# Patient Record
Sex: FEMALE | Race: WHITE | NOT HISPANIC OR LATINO | Employment: UNEMPLOYED | URBAN - METROPOLITAN AREA
[De-identification: names, ages, dates, MRNs, and addresses within clinical notes are randomized per-mention and may not be internally consistent; named-entity substitution may affect disease eponyms.]

---

## 2021-07-22 ENCOUNTER — APPOINTMENT (OUTPATIENT)
Dept: GENERAL RADIOLOGY | Facility: HOSPITAL | Age: 81
End: 2021-07-22

## 2021-07-22 ENCOUNTER — APPOINTMENT (OUTPATIENT)
Dept: CT IMAGING | Facility: HOSPITAL | Age: 81
End: 2021-07-22

## 2021-07-22 ENCOUNTER — HOSPITAL ENCOUNTER (OUTPATIENT)
Facility: HOSPITAL | Age: 81
Setting detail: OBSERVATION
LOS: 1 days | Discharge: HOME | End: 2021-07-25
Attending: EMERGENCY MEDICINE | Admitting: INTERNAL MEDICINE

## 2021-07-22 DIAGNOSIS — R41.82 ALTERED MENTAL STATUS, UNSPECIFIED ALTERED MENTAL STATUS TYPE: ICD-10-CM

## 2021-07-22 DIAGNOSIS — A41.9 ACUTE SEPSIS (HCC): Primary | ICD-10-CM

## 2021-07-22 DIAGNOSIS — Z74.09 IMPAIRED FUNCTIONAL MOBILITY, BALANCE, GAIT, AND ENDURANCE: ICD-10-CM

## 2021-07-22 DIAGNOSIS — R13.11 ORAL PHASE DYSPHAGIA: ICD-10-CM

## 2021-07-22 DIAGNOSIS — R09.02 HYPOXIA: ICD-10-CM

## 2021-07-22 DIAGNOSIS — N12 PYELONEPHRITIS: ICD-10-CM

## 2021-07-22 DIAGNOSIS — R73.9 HYPERGLYCEMIA: ICD-10-CM

## 2021-07-22 PROBLEM — I16.0 HYPERTENSIVE URGENCY: Status: ACTIVE | Noted: 2021-07-22

## 2021-07-22 PROBLEM — R79.89 ELEVATED SERUM CREATININE: Status: ACTIVE | Noted: 2021-07-22

## 2021-07-22 PROBLEM — E11.65 TYPE 2 DIABETES MELLITUS WITH HYPERGLYCEMIA: Status: ACTIVE | Noted: 2021-07-22

## 2021-07-22 LAB
ALBUMIN SERPL-MCNC: 4.3 G/DL (ref 3.5–5.2)
ALBUMIN/GLOB SERPL: 1.5 G/DL
ALP SERPL-CCNC: 86 U/L (ref 39–117)
ALT SERPL W P-5'-P-CCNC: 19 U/L (ref 1–33)
AMMONIA BLD-SCNC: 14 UMOL/L (ref 11–51)
ANION GAP SERPL CALCULATED.3IONS-SCNC: 12 MMOL/L (ref 5–15)
ANION GAP SERPL CALCULATED.3IONS-SCNC: 16 MMOL/L (ref 5–15)
ARTERIAL PATENCY WRIST A: ABNORMAL
AST SERPL-CCNC: 20 U/L (ref 1–32)
ATMOSPHERIC PRESS: ABNORMAL MM[HG]
B-OH-BUTYR SERPL-SCNC: 0.15 MMOL/L (ref 0.02–0.27)
BACTERIA UR QL AUTO: ABNORMAL /HPF
BASE EXCESS BLDA CALC-SCNC: 4.7 MMOL/L (ref 0–2)
BASOPHILS # BLD AUTO: 0.02 10*3/MM3 (ref 0–0.2)
BASOPHILS NFR BLD AUTO: 0.3 % (ref 0–1.5)
BDY SITE: ABNORMAL
BILIRUB SERPL-MCNC: 0.3 MG/DL (ref 0–1.2)
BILIRUB UR QL STRIP: NEGATIVE
BODY TEMPERATURE: 37 C
BUN SERPL-MCNC: 17 MG/DL (ref 8–23)
BUN SERPL-MCNC: 24 MG/DL (ref 8–23)
BUN/CREAT SERPL: 19.5 (ref 7–25)
BUN/CREAT SERPL: 20 (ref 7–25)
CALCIUM SPEC-SCNC: 9.2 MG/DL (ref 8.6–10.5)
CALCIUM SPEC-SCNC: 9.6 MG/DL (ref 8.6–10.5)
CHLORIDE SERPL-SCNC: 95 MMOL/L (ref 98–107)
CHLORIDE SERPL-SCNC: 95 MMOL/L (ref 98–107)
CK SERPL-CCNC: 40 U/L (ref 20–180)
CLARITY UR: CLEAR
CO2 BLDA-SCNC: 31.8 MMOL/L (ref 22–33)
CO2 SERPL-SCNC: 27 MMOL/L (ref 22–29)
CO2 SERPL-SCNC: 29 MMOL/L (ref 22–29)
COHGB MFR BLD: ABNORMAL %
COLOR UR: YELLOW
CREAT SERPL-MCNC: 0.87 MG/DL (ref 0.57–1)
CREAT SERPL-MCNC: 1.2 MG/DL (ref 0.57–1)
D-LACTATE SERPL-SCNC: 2.6 MMOL/L (ref 0.5–2)
D-LACTATE SERPL-SCNC: 2.8 MMOL/L (ref 0.5–2)
DEPRECATED RDW RBC AUTO: 48.3 FL (ref 37–54)
EOSINOPHIL # BLD AUTO: 0.02 10*3/MM3 (ref 0–0.4)
EOSINOPHIL NFR BLD AUTO: 0.3 % (ref 0.3–6.2)
EPAP: 0
ERYTHROCYTE [DISTWIDTH] IN BLOOD BY AUTOMATED COUNT: 14.1 % (ref 12.3–15.4)
FLUAV RNA RESP QL NAA+PROBE: NOT DETECTED
FLUBV RNA RESP QL NAA+PROBE: NOT DETECTED
GFR SERPL CREATININE-BSD FRML MDRD: 43 ML/MIN/1.73
GFR SERPL CREATININE-BSD FRML MDRD: 62 ML/MIN/1.73
GLOBULIN UR ELPH-MCNC: 2.9 GM/DL
GLUCOSE BLDC GLUCOMTR-MCNC: 106 MG/DL (ref 70–130)
GLUCOSE BLDC GLUCOMTR-MCNC: 112 MG/DL (ref 70–130)
GLUCOSE BLDC GLUCOMTR-MCNC: 245 MG/DL (ref 70–130)
GLUCOSE SERPL-MCNC: 209 MG/DL (ref 65–99)
GLUCOSE SERPL-MCNC: 345 MG/DL (ref 65–99)
GLUCOSE UR STRIP-MCNC: ABNORMAL MG/DL
HCO3 BLDA-SCNC: 30.3 MMOL/L (ref 20–26)
HCT VFR BLD AUTO: 39.9 % (ref 34–46.6)
HCT VFR BLD CALC: 37.2 %
HGB BLD-MCNC: 12.4 G/DL (ref 12–15.9)
HGB BLDA-MCNC: 12.1 G/DL (ref 14–18)
HGB UR QL STRIP.AUTO: NEGATIVE
HYALINE CASTS UR QL AUTO: ABNORMAL /LPF
IMM GRANULOCYTES # BLD AUTO: 0.03 10*3/MM3 (ref 0–0.05)
IMM GRANULOCYTES NFR BLD AUTO: 0.4 % (ref 0–0.5)
INHALED O2 CONCENTRATION: 32 %
INR PPP: 1.02 (ref 0.85–1.16)
IPAP: 0
KETONES UR QL STRIP: NEGATIVE
LEUKOCYTE ESTERASE UR QL STRIP.AUTO: NEGATIVE
LIPASE SERPL-CCNC: 43 U/L (ref 13–60)
LYMPHOCYTES # BLD AUTO: 1.23 10*3/MM3 (ref 0.7–3.1)
LYMPHOCYTES NFR BLD AUTO: 15.4 % (ref 19.6–45.3)
MCH RBC QN AUTO: 29.4 PG (ref 26.6–33)
MCHC RBC AUTO-ENTMCNC: 31.1 G/DL (ref 31.5–35.7)
MCV RBC AUTO: 94.5 FL (ref 79–97)
METHGB BLD QL: 0.5 % (ref 0–1.5)
MODALITY: ABNORMAL
MONOCYTES # BLD AUTO: 0.46 10*3/MM3 (ref 0.1–0.9)
MONOCYTES NFR BLD AUTO: 5.8 % (ref 5–12)
NEUTROPHILS NFR BLD AUTO: 6.23 10*3/MM3 (ref 1.7–7)
NEUTROPHILS NFR BLD AUTO: 77.8 % (ref 42.7–76)
NITRITE UR QL STRIP: POSITIVE
NOTE: ABNORMAL
NRBC BLD AUTO-RTO: 0 /100 WBC (ref 0–0.2)
NT-PROBNP SERPL-MCNC: 800.5 PG/ML (ref 0–1800)
OXYHGB MFR BLDV: 92.2 % (ref 94–99)
PAW @ PEAK INSP FLOW SETTING VENT: 0 CMH2O
PCO2 BLDA: 48.4 MM HG (ref 35–45)
PCO2 TEMP ADJ BLD: 48.4 MM HG (ref 35–45)
PH BLDA: 7.41 PH UNITS (ref 7.35–7.45)
PH UR STRIP.AUTO: 7 [PH] (ref 5–8)
PH, TEMP CORRECTED: 7.41 PH UNITS
PLATELET # BLD AUTO: 128 10*3/MM3 (ref 140–450)
PMV BLD AUTO: 13.5 FL (ref 6–12)
PO2 BLDA: 69.6 MM HG (ref 83–108)
PO2 TEMP ADJ BLD: 69.6 MM HG (ref 83–108)
POTASSIUM SERPL-SCNC: 3.7 MMOL/L (ref 3.5–5.2)
POTASSIUM SERPL-SCNC: 4.4 MMOL/L (ref 3.5–5.2)
PROCALCITONIN SERPL-MCNC: 0.08 NG/ML (ref 0–0.25)
PROT SERPL-MCNC: 7.2 G/DL (ref 6–8.5)
PROT UR QL STRIP: ABNORMAL
PROTHROMBIN TIME: 13.1 SECONDS (ref 11.4–14.4)
QT INTERVAL: 400 MS
QTC INTERVAL: 478 MS
RBC # BLD AUTO: 4.22 10*6/MM3 (ref 3.77–5.28)
RBC # UR: ABNORMAL /HPF
REF LAB TEST METHOD: ABNORMAL
SALICYLATES SERPL-MCNC: <0.3 MG/DL
SARS-COV-2 RNA RESP QL NAA+PROBE: NOT DETECTED
SODIUM SERPL-SCNC: 136 MMOL/L (ref 136–145)
SODIUM SERPL-SCNC: 138 MMOL/L (ref 136–145)
SP GR UR STRIP: 1.01 (ref 1–1.03)
SQUAMOUS #/AREA URNS HPF: ABNORMAL /HPF
T4 FREE SERPL-MCNC: 1.15 NG/DL (ref 0.93–1.7)
TOTAL RATE: 0 BREATHS/MINUTE
TROPONIN T SERPL-MCNC: <0.01 NG/ML (ref 0–0.03)
TSH SERPL DL<=0.05 MIU/L-ACNC: 1.47 UIU/ML (ref 0.27–4.2)
UROBILINOGEN UR QL STRIP: ABNORMAL
VENTILATOR MODE: ABNORMAL
WBC # BLD AUTO: 7.99 10*3/MM3 (ref 3.4–10.8)
WBC UR QL AUTO: ABNORMAL /HPF

## 2021-07-22 PROCEDURE — 71045 X-RAY EXAM CHEST 1 VIEW: CPT

## 2021-07-22 PROCEDURE — P9612 CATHETERIZE FOR URINE SPEC: HCPCS

## 2021-07-22 PROCEDURE — 83605 ASSAY OF LACTIC ACID: CPT | Performed by: EMERGENCY MEDICINE

## 2021-07-22 PROCEDURE — 36600 WITHDRAWAL OF ARTERIAL BLOOD: CPT

## 2021-07-22 PROCEDURE — 85610 PROTHROMBIN TIME: CPT | Performed by: INTERNAL MEDICINE

## 2021-07-22 PROCEDURE — 83690 ASSAY OF LIPASE: CPT | Performed by: EMERGENCY MEDICINE

## 2021-07-22 PROCEDURE — 74176 CT ABD & PELVIS W/O CONTRAST: CPT

## 2021-07-22 PROCEDURE — 82550 ASSAY OF CK (CPK): CPT | Performed by: EMERGENCY MEDICINE

## 2021-07-22 PROCEDURE — 93005 ELECTROCARDIOGRAM TRACING: CPT | Performed by: EMERGENCY MEDICINE

## 2021-07-22 PROCEDURE — 25010000002 CEFTRIAXONE PER 250 MG: Performed by: INTERNAL MEDICINE

## 2021-07-22 PROCEDURE — 84484 ASSAY OF TROPONIN QUANT: CPT | Performed by: EMERGENCY MEDICINE

## 2021-07-22 PROCEDURE — 92610 EVALUATE SWALLOWING FUNCTION: CPT

## 2021-07-22 PROCEDURE — 96365 THER/PROPH/DIAG IV INF INIT: CPT

## 2021-07-22 PROCEDURE — C9803 HOPD COVID-19 SPEC COLLECT: HCPCS

## 2021-07-22 PROCEDURE — 83050 HGB METHEMOGLOBIN QUAN: CPT

## 2021-07-22 PROCEDURE — 82375 ASSAY CARBOXYHB QUANT: CPT

## 2021-07-22 PROCEDURE — 99220 PR INITIAL OBSERVATION CARE/DAY 70 MINUTES: CPT | Performed by: INTERNAL MEDICINE

## 2021-07-22 PROCEDURE — 87186 SC STD MICRODIL/AGAR DIL: CPT | Performed by: EMERGENCY MEDICINE

## 2021-07-22 PROCEDURE — 85025 COMPLETE CBC W/AUTO DIFF WBC: CPT | Performed by: EMERGENCY MEDICINE

## 2021-07-22 PROCEDURE — 82962 GLUCOSE BLOOD TEST: CPT

## 2021-07-22 PROCEDURE — 87040 BLOOD CULTURE FOR BACTERIA: CPT | Performed by: EMERGENCY MEDICINE

## 2021-07-22 PROCEDURE — 25010000002 PIPERACILLIN SOD-TAZOBACTAM PER 1 G: Performed by: EMERGENCY MEDICINE

## 2021-07-22 PROCEDURE — 84145 PROCALCITONIN (PCT): CPT | Performed by: INTERNAL MEDICINE

## 2021-07-22 PROCEDURE — 83880 ASSAY OF NATRIURETIC PEPTIDE: CPT | Performed by: EMERGENCY MEDICINE

## 2021-07-22 PROCEDURE — 82010 KETONE BODYS QUAN: CPT | Performed by: INTERNAL MEDICINE

## 2021-07-22 PROCEDURE — 82805 BLOOD GASES W/O2 SATURATION: CPT

## 2021-07-22 PROCEDURE — 63710000001 INSULIN LISPRO (HUMAN) PER 5 UNITS: Performed by: INTERNAL MEDICINE

## 2021-07-22 PROCEDURE — 70450 CT HEAD/BRAIN W/O DYE: CPT

## 2021-07-22 PROCEDURE — 81001 URINALYSIS AUTO W/SCOPE: CPT | Performed by: EMERGENCY MEDICINE

## 2021-07-22 PROCEDURE — 82140 ASSAY OF AMMONIA: CPT | Performed by: EMERGENCY MEDICINE

## 2021-07-22 PROCEDURE — 84439 ASSAY OF FREE THYROXINE: CPT | Performed by: EMERGENCY MEDICINE

## 2021-07-22 PROCEDURE — 80179 DRUG ASSAY SALICYLATE: CPT | Performed by: EMERGENCY MEDICINE

## 2021-07-22 PROCEDURE — 87077 CULTURE AEROBIC IDENTIFY: CPT | Performed by: EMERGENCY MEDICINE

## 2021-07-22 PROCEDURE — 84443 ASSAY THYROID STIM HORMONE: CPT | Performed by: EMERGENCY MEDICINE

## 2021-07-22 PROCEDURE — 87636 SARSCOV2 & INF A&B AMP PRB: CPT | Performed by: EMERGENCY MEDICINE

## 2021-07-22 PROCEDURE — 87086 URINE CULTURE/COLONY COUNT: CPT | Performed by: EMERGENCY MEDICINE

## 2021-07-22 PROCEDURE — 99285 EMERGENCY DEPT VISIT HI MDM: CPT

## 2021-07-22 PROCEDURE — 80053 COMPREHEN METABOLIC PANEL: CPT | Performed by: EMERGENCY MEDICINE

## 2021-07-22 RX ORDER — AMOXICILLIN 250 MG
2 CAPSULE ORAL 2 TIMES DAILY
Status: DISCONTINUED | OUTPATIENT
Start: 2021-07-22 | End: 2021-07-25 | Stop reason: HOSPADM

## 2021-07-22 RX ORDER — HYDRALAZINE HYDROCHLORIDE 10 MG/1
10 TABLET, FILM COATED ORAL 3 TIMES DAILY
COMMUNITY

## 2021-07-22 RX ORDER — NYSTATIN 100000 [USP'U]/G
POWDER TOPICAL EVERY 12 HOURS SCHEDULED
Status: DISCONTINUED | OUTPATIENT
Start: 2021-07-22 | End: 2021-07-25 | Stop reason: HOSPADM

## 2021-07-22 RX ORDER — SODIUM CHLORIDE 0.9 % (FLUSH) 0.9 %
10 SYRINGE (ML) INJECTION EVERY 12 HOURS SCHEDULED
Status: DISCONTINUED | OUTPATIENT
Start: 2021-07-22 | End: 2021-07-25 | Stop reason: HOSPADM

## 2021-07-22 RX ORDER — ACETAMINOPHEN 325 MG/1
650 TABLET ORAL EVERY 4 HOURS PRN
Status: DISCONTINUED | OUTPATIENT
Start: 2021-07-22 | End: 2021-07-25 | Stop reason: HOSPADM

## 2021-07-22 RX ORDER — ACETAMINOPHEN 650 MG/1
650 SUPPOSITORY RECTAL EVERY 4 HOURS PRN
Status: DISCONTINUED | OUTPATIENT
Start: 2021-07-22 | End: 2021-07-25 | Stop reason: HOSPADM

## 2021-07-22 RX ORDER — FUROSEMIDE 20 MG/1
20 TABLET ORAL DAILY
COMMUNITY

## 2021-07-22 RX ORDER — ACETAMINOPHEN 160 MG/5ML
650 SOLUTION ORAL EVERY 4 HOURS PRN
Status: DISCONTINUED | OUTPATIENT
Start: 2021-07-22 | End: 2021-07-25 | Stop reason: HOSPADM

## 2021-07-22 RX ORDER — DEXTROSE MONOHYDRATE 25 G/50ML
25 INJECTION, SOLUTION INTRAVENOUS
Status: DISCONTINUED | OUTPATIENT
Start: 2021-07-22 | End: 2021-07-25 | Stop reason: HOSPADM

## 2021-07-22 RX ORDER — POLYETHYLENE GLYCOL 3350 17 G/17G
17 POWDER, FOR SOLUTION ORAL DAILY PRN
Status: DISCONTINUED | OUTPATIENT
Start: 2021-07-22 | End: 2021-07-25 | Stop reason: HOSPADM

## 2021-07-22 RX ORDER — ACETAMINOPHEN 325 MG/1
650 TABLET ORAL EVERY 4 HOURS PRN
COMMUNITY

## 2021-07-22 RX ORDER — SODIUM CHLORIDE 9 MG/ML
100 INJECTION, SOLUTION INTRAVENOUS CONTINUOUS
Status: DISCONTINUED | OUTPATIENT
Start: 2021-07-22 | End: 2021-07-25 | Stop reason: HOSPADM

## 2021-07-22 RX ORDER — ISOSORBIDE MONONITRATE 30 MG/1
30 TABLET, EXTENDED RELEASE ORAL DAILY PRN
Status: ON HOLD | COMMUNITY
End: 2021-07-25 | Stop reason: SDUPTHER

## 2021-07-22 RX ORDER — GLIPIZIDE 5 MG/1
5 TABLET ORAL DAILY
Status: ON HOLD | COMMUNITY
End: 2021-07-23

## 2021-07-22 RX ORDER — NICOTINE POLACRILEX 4 MG
15 LOZENGE BUCCAL
Status: DISCONTINUED | OUTPATIENT
Start: 2021-07-22 | End: 2021-07-25 | Stop reason: HOSPADM

## 2021-07-22 RX ORDER — BISACODYL 10 MG
10 SUPPOSITORY, RECTAL RECTAL DAILY PRN
Status: DISCONTINUED | OUTPATIENT
Start: 2021-07-22 | End: 2021-07-25 | Stop reason: HOSPADM

## 2021-07-22 RX ORDER — BISACODYL 5 MG/1
5 TABLET, DELAYED RELEASE ORAL DAILY PRN
Status: DISCONTINUED | OUTPATIENT
Start: 2021-07-22 | End: 2021-07-25 | Stop reason: HOSPADM

## 2021-07-22 RX ORDER — LOSARTAN POTASSIUM 25 MG/1
25 TABLET ORAL DAILY
COMMUNITY

## 2021-07-22 RX ORDER — LOPERAMIDE HYDROCHLORIDE 2 MG/1
2 CAPSULE ORAL 4 TIMES DAILY PRN
COMMUNITY

## 2021-07-22 RX ORDER — CEFTRIAXONE SODIUM 1 G/50ML
1 INJECTION, SOLUTION INTRAVENOUS EVERY 24 HOURS
Status: COMPLETED | OUTPATIENT
Start: 2021-07-22 | End: 2021-07-25

## 2021-07-22 RX ORDER — LABETALOL HYDROCHLORIDE 5 MG/ML
10 INJECTION, SOLUTION INTRAVENOUS
Status: DISCONTINUED | OUTPATIENT
Start: 2021-07-22 | End: 2021-07-25 | Stop reason: HOSPADM

## 2021-07-22 RX ORDER — LOSARTAN POTASSIUM 25 MG/1
25 TABLET ORAL
Status: DISCONTINUED | OUTPATIENT
Start: 2021-07-22 | End: 2021-07-25 | Stop reason: HOSPADM

## 2021-07-22 RX ORDER — ROSUVASTATIN CALCIUM 40 MG/1
40 TABLET, COATED ORAL NIGHTLY
COMMUNITY

## 2021-07-22 RX ORDER — SODIUM CHLORIDE 0.9 % (FLUSH) 0.9 %
10 SYRINGE (ML) INJECTION AS NEEDED
Status: DISCONTINUED | OUTPATIENT
Start: 2021-07-22 | End: 2021-07-25 | Stop reason: HOSPADM

## 2021-07-22 RX ORDER — CARVEDILOL 6.25 MG/1
6.25 TABLET ORAL 2 TIMES DAILY
COMMUNITY

## 2021-07-22 RX ORDER — CARVEDILOL 6.25 MG/1
6.25 TABLET ORAL 2 TIMES DAILY WITH MEALS
Status: DISCONTINUED | OUTPATIENT
Start: 2021-07-22 | End: 2021-07-25 | Stop reason: HOSPADM

## 2021-07-22 RX ADMIN — TAZOBACTAM SODIUM AND PIPERACILLIN SODIUM 4.5 G: 500; 4 INJECTION, SOLUTION INTRAVENOUS at 06:55

## 2021-07-22 RX ADMIN — RIVAROXABAN 20 MG: 20 TABLET, FILM COATED ORAL at 18:55

## 2021-07-22 RX ADMIN — CARVEDILOL 6.25 MG: 6.25 TABLET, FILM COATED ORAL at 18:55

## 2021-07-22 RX ADMIN — INSULIN LISPRO 3 UNITS: 100 INJECTION, SOLUTION INTRAVENOUS; SUBCUTANEOUS at 12:29

## 2021-07-22 RX ADMIN — CEFTRIAXONE SODIUM 1 G: 1 INJECTION, SOLUTION INTRAVENOUS at 15:38

## 2021-07-22 RX ADMIN — NYSTATIN: 100000 POWDER TOPICAL at 22:12

## 2021-07-22 RX ADMIN — DOCUSATE SODIUM 50MG AND SENNOSIDES 8.6MG 2 TABLET: 8.6; 5 TABLET, FILM COATED ORAL at 22:11

## 2021-07-22 NOTE — ED PROVIDER NOTES
[place]    EMERGENCY DEPARTMENT ENCOUNTER      Pt Name: Angeles Hoffman  MRN: [id number]  YOB: 1940  Date of evaluation: 7/22/2021  Provider: Rashad Santa MD    CHIEF COMPLAINT       Chief Complaint   Patient presents with   • Altered Mental Status         HISTORY OF PRESENT ILLNESS  (Location/Symptom, Timing/Onset, Context/Setting, Quality, Duration, Modifying Factors, Severity.)   Angeles Hoffman is a 80 y.o. female who presents to the emergency department with reports of altered mental status of unknown duration.  Per EMS, history was limited from the patient's  at home who speaks very little English.  She apparently has been very altered, much more so than usual, over the course of the past day and her glucose reading has also been too high for her home monitor to read.  Her past medical history is unclear at this point is no additional history was given by EMS and we have no record for her here.  We are unable to obtain any additional history from the patient herself secondary to alteration in mental status and there is no contact information for family members.      Nursing notes were reviewed.    REVIEW OF SYSTEMS    (2-9 systems for level 4, 10 or more for level 5)   ROS:  Unable to obtain secondary to mental status and language barrier    PAST MEDICAL HISTORY   Unknown    SURGICAL HISTORY     Unknown    CURRENT MEDICATIONS       Current Facility-Administered Medications:   •  piperacillin-tazobactam (ZOSYN) 4.5 g in iso-osmotic dextrose 100 mL IVPB (premix), 4.5 g, Intravenous, Once, Rashad Santa MD  No current outpatient medications on file.    ALLERGIES     Patient has no allergy information on record.    FAMILY HISTORY     No family history on file.       SOCIAL HISTORY       Social History     Socioeconomic History   • Marital status:      Spouse name: Not on file   • Number of children: Not on file   • Years of education: Not on file   • Highest education level:  "Not on file         PHYSICAL EXAM    (up to 7 for level 4, 8 or more for level 5)     Vitals:    07/22/21 0507 07/22/21 0541 07/22/21 0542 07/22/21 0620   BP: (!) 143/101  (!) 216/95 (!) 203/82   BP Location: Left arm      Patient Position: Lying      Pulse: 89   74   Resp:   22    Temp:   99.9 °F (37.7 °C)    TempSrc:   Oral    SpO2: 97%   95%   Weight:  104 kg (230 lb)     Height:  177.8 cm (70\")         Physical Exam  General: Awake, mild distress  HEENT: Conjunctivae normal.  Pupils equal and reactive  Neck: Trachea midline.  Cardiac: Heart regular rate, rhythm, no murmurs, rubs, or gallops  Lungs: Lungs coarse throughout  Chest wall: No deformity  Abdomen: Abdomen is soft, nontender, nondistended.  Musculoskeletal: No deformity.  Neuro: Moves all extremities equally  Dermatology: Skin is warm and dry  Psych: Unable to assess        DIAGNOSTIC RESULTS     EKG: All EKG's are interpreted by the Emergency Department Physician who either signs or Co-signs this chart in the absence of a cardiologist.    Bifascicular block, sinus rhythm rate of 86, no acute ischemic changes    RADIOLOGY:   Non-plain film images such as CT, Ultrasound and MRI are read by the radiologist. Plain radiographic images are visualized and preliminarily interpreted by the emergency physician with the below findings:      [x] Radiologist's Report Reviewed:  CT Head Without Contrast   Final Result   Senescent changes without acute abnormality.               Signer Name: Tam Carmen MD    Signed: 7/22/2021 6:07 AM    Workstation Name: [facility]     Radiology [facility]      CT Abdomen Pelvis Without Contrast   Final Result   No acute or inflammatory changes are seen in the abdomen or pelvis.               Signer Name: Tam Carmen MD    Signed: 7/22/2021 6:13 AM    Workstation Name: [facility]     Radiology [facility]      XR Chest 1 View    (Results Pending)         LABS:    I have reviewed and interpreted all " of the currently available lab results from this visit (if applicable):  Results for orders placed or performed during the hospital encounter of 07/22/21   COVID-19 and FLU A/B PCR - Swab, Nasopharynx    Specimen: Nasopharynx; Swab   Result Value Ref Range    COVID19 Not Detected Not Detected - Ref. Range    Influenza A PCR Not Detected Not Detected    Influenza B PCR Not Detected Not Detected   Comprehensive Metabolic Panel    Specimen: Blood   Result Value Ref Range    Glucose 345 (H) 65 - 99 mg/dL    BUN 24 (H) 8 - 23 mg/dL    Creatinine 1.20 (H) 0.57 - 1.00 mg/dL    Sodium 138 136 - 145 mmol/L    Potassium 4.4 3.5 - 5.2 mmol/L    Chloride 95 (L) 98 - 107 mmol/L    CO2 27.0 22.0 - 29.0 mmol/L    Calcium 9.6 8.6 - 10.5 mg/dL    Total Protein 7.2 6.0 - 8.5 g/dL    Albumin 4.30 3.50 - 5.20 g/dL    ALT (SGPT) 19 1 - 33 U/L    AST (SGOT) 20 1 - 32 U/L    Alkaline Phosphatase 86 39 - 117 U/L    Total Bilirubin 0.3 0.0 - 1.2 mg/dL    eGFR Non African Amer 43 (L) >60 mL/min/1.73    Globulin 2.9 gm/dL    A/G Ratio 1.5 g/dL    BUN/Creatinine Ratio 20.0 7.0 - 25.0    Anion Gap 16.0 (H) 5.0 - 15.0 mmol/L   Lipase    Specimen: Blood   Result Value Ref Range    Lipase 43 13 - 60 U/L   BNP    Specimen: Blood   Result Value Ref Range    proBNP 800.5 0.0-1,800.0 pg/mL   Troponin    Specimen: Blood   Result Value Ref Range    Troponin T <0.010 0.000 - 0.030 ng/mL   Lactic Acid, Plasma    Specimen: Blood   Result Value Ref Range    Lactate 2.6 (C) 0.5 - 2.0 mmol/L   Salicylate Level    Specimen: Blood   Result Value Ref Range    Salicylate <0.3 <=30.0 mg/dL   CK    Specimen: Blood   Result Value Ref Range    Creatine Kinase 40 20 - 180 U/L   TSH    Specimen: Blood   Result Value Ref Range    TSH 1.470 0.270 - 4.200 uIU/mL   T4, Free    Specimen: Blood   Result Value Ref Range    Free T4 1.15 0.93 - 1.70 ng/dL   Ammonia    Specimen: Blood   Result Value Ref Range    Ammonia 14 11 - 51 umol/L   CBC Auto Differential    Specimen:  Blood   Result Value Ref Range    WBC 7.99 3.40 - 10.80 10*3/mm3    RBC 4.22 3.77 - 5.28 10*6/mm3    Hemoglobin 12.4 12.0 - 15.9 g/dL    Hematocrit 39.9 34.0 - 46.6 %    MCV 94.5 79.0 - 97.0 fL    MCH 29.4 26.6 - 33.0 pg    MCHC 31.1 (L) 31.5 - 35.7 g/dL    RDW 14.1 12.3 - 15.4 %    RDW-SD 48.3 37.0 - 54.0 fl    MPV 13.5 (H) 6.0 - 12.0 fL    Platelets 128 (L) 140 - 450 10*3/mm3    Neutrophil % 77.8 (H) 42.7 - 76.0 %    Lymphocyte % 15.4 (L) 19.6 - 45.3 %    Monocyte % 5.8 5.0 - 12.0 %    Eosinophil % 0.3 0.3 - 6.2 %    Basophil % 0.3 0.0 - 1.5 %    Immature Grans % 0.4 0.0 - 0.5 %    Neutrophils, Absolute 6.23 1.70 - 7.00 10*3/mm3    Lymphocytes, Absolute 1.23 0.70 - 3.10 10*3/mm3    Monocytes, Absolute 0.46 0.10 - 0.90 10*3/mm3    Eosinophils, Absolute 0.02 0.00 - 0.40 10*3/mm3    Basophils, Absolute 0.02 0.00 - 0.20 10*3/mm3    Immature Grans, Absolute 0.03 0.00 - 0.05 10*3/mm3    nRBC 0.0 0.0 - 0.2 /100 WBC   Urinalysis With Culture If Indicated - Urine, Catheter    Specimen: Urine, Catheter   Result Value Ref Range    Color, UA Yellow Yellow, Straw    Appearance, UA Clear Clear    pH, UA 7.0 5.0 - 8.0    Specific Gravity, UA 1.014 1.001 - 1.030    Glucose, UA >=1000 mg/dL (3+) (A) Negative    Ketones, UA Negative Negative    Bilirubin, UA Negative Negative    Blood, UA Negative Negative    Protein, UA 30 mg/dL (1+) (A) Negative    Leuk Esterase, UA Negative Negative    Nitrite, UA Positive (A) Negative    Urobilinogen, UA 0.2 E.U./dL 0.2 - 1.0 E.U./dL   Blood Gas, Arterial With Co-Ox    Specimen: Arterial Blood   Result Value Ref Range    Site Left Radial     Amrik's Test N/A     pH, Arterial 7.405 7.350 - 7.450 pH units    pCO2, Arterial 48.4 (H) 35.0 - 45.0 mm Hg    pO2, Arterial 69.6 (L) 83.0 - 108.0 mm Hg    HCO3, Arterial 30.3 (H) 20.0 - 26.0 mmol/L    Base Excess, Arterial 4.7 (H) 0.0 - 2.0 mmol/L    Hemoglobin, Blood Gas 12.1 (L) 14 - 18 g/dL    Hematocrit, Blood Gas 37.2 %    Oxyhemoglobin 92.2 (L) 94 -  "99 %    Methemoglobin 0.50 0.00 - 1.50 %    Carboxyhemoglobin      CO2 Content 31.8 22 - 33 mmol/L    Temperature 37.0 C    Barometric Pressure for Blood Gas      Modality Nasal Cannula     FIO2 32 %    Ventilator Mode       Rate 0 Breaths/minute    PIP 0 cmH2O    IPAP 0     EPAP 0     Note      pH, Temp Corrected 7.405 pH Units    pCO2, Temperature Corrected 48.4 (H) 35 - 45 mm Hg    pO2, Temperature Corrected 69.6 (L) 83 - 108 mm Hg   Urinalysis, Microscopic Only - Urine, Catheter    Specimen: Urine, Catheter   Result Value Ref Range    RBC, UA 0-2 None Seen, 0-2 /HPF    WBC, UA 0-2 None Seen, 0-2 /HPF    Bacteria, UA 4+ (A) None Seen, Trace /HPF    Squamous Epithelial Cells, UA 0-2 None Seen, 0-2 /HPF    Hyaline Casts, UA 0-6 0 - 6 /LPF    Methodology Automated Microscopy         All other labs were within normal range or not returned as of this dictation.      EMERGENCY DEPARTMENT COURSE and DIFFERENTIAL DIAGNOSIS/MDM:   Vitals:    Vitals:    07/22/21 0507 07/22/21 0541 07/22/21 0542 07/22/21 0620   BP: (!) 143/101  (!) 216/95 (!) 203/82   BP Location: Left arm      Patient Position: Lying      Pulse: 89   74   Resp:   22    Temp:   99.9 °F (37.7 °C)    TempSrc:   Oral    SpO2: 97%   95%   Weight:  104 kg (230 lb)     Height:  177.8 cm (70\")         ED Course as of Jul 22 0648   Thu Jul 22, 2021   0610 CT scan reviewed and there is no evidence of acute intracranial hemorrhage, mass, or or ischemia.  She appears to be more of a metabolic encephalopathy and has no focal deficits on exam to suggest stroke.    [NS]   0618 Laboratory evaluation demonstrates evidence of urinary tract infection.  The urine has been cultured and will provide a dose of Zosyn.  CT scan of the abdomen pelvis demonstrates no evidence of concomitant stone or other acute intra-abdominal pelvic process such as bili pathology or appendicitis.    [NS]   0640 Spoke w/ Dr. Barroso who accepts the pt for admission.    [NS]      ED Course User " Index  [NS] Rashad Santa MD       Reason prefer presentation is likely multifactorial including urinary tract infection.  Patient also has coarse cough in addition to new oxygen requirement and suspect a component of underlying pneumonia or possibly aspiration as well.  Patient empirically covered with a dose of Zosyn.  There is no evidence of thyroid abnormality, hyperammonemia, significant electrolyte derangement, or hypercapnia.  The patient will be admitted to the hospitalist service for further evaluation and care.      MEDICATIONS ADMINISTERED IN ED:  Medications   piperacillin-tazobactam (ZOSYN) 4.5 g in iso-osmotic dextrose 100 mL IVPB (premix) (has no administration in time range)         CRITICAL CARE TIME    Approximately 45 minutes of discontinuous critical care time was provided to this patient by myself absent of any time spent performing procedures.  Patient presents critically ill with acute altered mental status secondary to sepsis from pyelonephritis and possibly underlying aspiration placing the cardiovascular, respiratory, and neurologic systems at risk requiring the following interventions: Administration of broad-spectrum IV antibiotics, IV fluid resuscitation, interpretation of lab/ECG/imaging, frequent reassessment, coordination of admission with the following response: Improvement in hypoxia.  Patient at high risk of deterioration and possibly death without these interventions.      FINAL IMPRESSION      1. Acute sepsis (CMS/HCC)    2. Pyelonephritis    3. Hypoxia    4. Altered mental status, unspecified altered mental status type    5. Hyperglycemia          DISPOSITION/PLAN     ED Disposition     ED Disposition Condition Comment    Decision to Admit              Rashad Santa MD  Attending Emergency Physician               Rashad Santa MD  07/22/21 5543

## 2021-07-22 NOTE — CASE MANAGEMENT/SOCIAL WORK
Discharge Planning Assessment  [facility]     Patient Name: Angeles Hoffman  MRN: [id number]  Today's Date: 7/22/2021    Admit Date: 7/22/2021    Discharge Needs Assessment     Row Name 07/22/21 1158       Living Environment    Lives With  spouse    Name(s) of Who Lives With Patient   Anil Hummel [phone] ( but didn’t change the last name)    Current Living Arrangements  home/apartment/condo    Primary Care Provided by  spouse/significant other;self    Provides Primary Care For  no one    Family Caregiver if Needed  spouse    Family Caregiver Names   Anil Hummel [phone]    Quality of Family Relationships  helpful;involved       Transition Planning    Transportation Anticipated  family or friend will provide       Discharge Needs Assessment    Readmission Within the Last 30 Days  no previous admission in last 30 days    Equipment Currently Used at Home  wheelchair;shower chair;commode;walker, rolling        Discharge Plan     Row Name 07/22/21 1200       Plan    Plan  TBD    Plan Comments  SW attempted to meet with patient at bedside to discuss discharge planning, however unable, spoke with her  Anil Hummel [phone] ( but didn’t change the last name).  speaks very limited English. Reports living in [facility] with . Reports that patient needs help with ADL’s (due to a stroke 2yrs ago she is limited on her right side), DME includes wheelchair, shower chair, walker, commode and no HH.  reports patient is wheelchair bound.  reports they have a daughter named Sara [phone]. SW attempted to call Madhavi, no answer, left a message; awaiting return call. Plan TBD        Continued Care and Services - Admitted Since 7/22/2021    Coordination has not been started for this encounter.         Demographic Summary     Row Name 07/22/21 1157       General Information    Admission Type  inpatient    Arrived From  emergency department     Referral Source  admission list    Reason for Consult  discharge planning        Functional Status     Row Name 07/22/21 115       Functional Status    Usual Activity Tolerance  fair    Current Activity Tolerance  fair       Functional Status, IADL    Medications  assistive equipment and person    Meal Preparation  assistive equipment and person    Housekeeping  assistive equipment and person    Laundry  assistive equipment and person    Shopping  assistive equipment and person    IADL Comments  patient needs help with ADL’s (due to a stroke 2yrs ago she is limited on her right side), DME includes wheelchair, shower chair, walker, commode       Employment/    Employment/ Comments  Patient has [employer] Medicare and can afford medications.        Psychosocial    No documentation.       Abuse/Neglect    No documentation.       Legal    No documentation.       Substance Abuse    No documentation.       Patient Forms    No documentation.           CONSTANCE Hernandez (Kay)

## 2021-07-22 NOTE — H&P
[facility] Medicine Services  HISTORY AND PHYSICAL    Patient Name: Angeles Hoffman  : 1940  MRN: [id number]  Primary Care Physician: Provider, No Known  Date of admission: 2021      Subjective   Subjective     Chief Complaint:  Altered mental status     HPI:  Angeles Hoffman is a 80 y.o. female Moldovan speaking, with PMH of T2DM, hx of CVA 5 years, CAD s/p MI, and HTN who presented to the ED with AMS. History very limited due to language barrier and AMS. In the ED several attempts were made to speak with patient via , but they were unsuccessful in getting a response. Nurse Enoch was able to get a hold of her , who had a friend with him who was able to help interpret. Patient had blood glucose up to 500 last night, didn't start feeling bad until 2 AM, when she became more lethargic, vomited x2, and was hard to wake up. That is when he brought her to the hospital. I was able to speak with daughter, Sara, she was not with them at the time, but she speaks english and will try to stop by after work. Has been running low on medications as they recently moved from [facility].    COVID Details:    Symptoms:    [x] NONE [] Fever []  Cough [] Shortness of breath [] Change in taste/smell      The patient qualifies to receive the vaccine, but they have not yet received it.      Review of Systems   Unable to obtain due to patients mental status / language barrier  All other systems reviewed and are negative.     Personal History     Past Medical History:   Diagnosis Date   • Diabetes mellitus (CMS/HCC)    • Elevated cholesterol    • Hypertension        No past surgical history on file.    Family History:  family history is not on file. Otherwise pertinent FHx was reviewed and unremarkable.     Social History:  reports that she has never smoked. She does not have any smokeless tobacco history on file. She reports that she does not drink alcohol and does not use  drugs.  Social History     Social History Narrative   • Not on file       Medications:  Available home medication information reviewed.  No medications prior to admission.       Not on File    Objective   Objective     Vital Signs:   Temp:  [98 °F (36.7 °C)-99.9 °F (37.7 °C)] 98 °F (36.7 °C)  Heart Rate:  [74-89] 76  Resp:  [20-26] 22  BP: (131-216)/() 131/79  Flow (L/min):  [3-4] 4       Physical Exam   Constitutional: No acute distress, laying in bed eyes closed, will open eyes spontaenously to verbal stimuli  HENT: NCAT, mucous membranes moist  Respiratory: Clear to auscultation bilaterally, respiratory effort normal  Cardiovascular: RRR, no murmurs  Gastrointestinal: Positive bowel sounds, soft, nontender, nondistended  Psychiatric: uncooperative  Neurologic: disoriented, was unable to understand Welsh , moving extremities equally  Skin: No rashes    Result Review:  I have personally reviewed the results from the time of this admission to 7/22/2021 12:30 EDT and agree with these findings:  [x]  Laboratory  []  Microbiology  [x]  Radiology  []  EKG/Telemetry   []  Cardiology/Vascular   []  Pathology  []  Old records  []  Other:  Most notable findings include:       LAB RESULTS:      Lab 07/22/21  1032 07/22/21  0540   WBC  --  7.99   HEMOGLOBIN  --  12.4   HEMATOCRIT  --  39.9   PLATELETS  --  128*   NEUTROS ABS  --  6.23   IMMATURE GRANS (ABS)  --  0.03   LYMPHS ABS  --  1.23   MONOS ABS  --  0.46   EOS ABS  --  0.02   MCV  --  94.5   PROCALCITONIN  --  0.08   LACTATE 2.8* 2.6*   PROTIME 13.1  --    INR 1.02  --          Lab 07/22/21  0540   SODIUM 138   POTASSIUM 4.4   CHLORIDE 95*   CO2 27.0   ANION GAP 16.0*   BUN 24*   CREATININE 1.20*   GLUCOSE 345*   CALCIUM 9.6   TSH 1.470         Lab 07/22/21  0540   TOTAL PROTEIN 7.2   ALBUMIN 4.30   GLOBULIN 2.9   ALT (SGPT) 19   AST (SGOT) 20   BILIRUBIN 0.3   ALK PHOS 86   LIPASE 43         Lab 07/22/21  0540   PROBNP 800.5   TROPONIN T <0.010                  Lab 07/22/21 0538   PH, ARTERIAL 7.405   PCO2, ARTERIAL 48.4*   PO2 ART 69.6*   FIO2 32   HCO3 ART 30.3*   BASE EXCESS ART 4.7*     UA    Urinalysis 7/22/21 7/22/21 0540 0540   Squamous Epithelial Cells, UA  0-2   Specific Gravity, UA 1.014    Ketones, UA Negative    Blood, UA Negative    Leukocytes, UA Negative    Nitrite, UA Positive (A)    RBC, UA  0-2   WBC, UA  0-2   Bacteria, UA  4+ (A)   (A) Abnormal value              Microbiology Results (last 10 days)     Procedure Component Value - Date/Time    COVID PRE-OP / PRE-PROCEDURE SCREENING ORDER (NO ISOLATION) - Swab, Nasopharynx [[id number]]  (Normal) Collected: 07/22/21 0540    Lab Status: Final result Specimen: Swab from Nasopharynx Updated: 07/22/21 0616    Narrative:      The following orders were created for panel order COVID PRE-OP / PRE-PROCEDURE SCREENING ORDER (NO ISOLATION) - Swab, Nasopharynx.  Procedure                               Abnormality         Status                     ---------                               -----------         ------                     COVID-19 and FLU A/B PCR...[[id number]]  Normal              Final result                 Please view results for these tests on the individual orders.    COVID-19 and FLU A/B PCR - Swab, Nasopharynx [[id number]]  (Normal) Collected: 07/22/21 0540    Lab Status: Final result Specimen: Swab from Nasopharynx Updated: 07/22/21 0616     COVID19 Not Detected     Influenza A PCR Not Detected     Influenza B PCR Not Detected    Narrative:      Fact sheet for providers: https://www.fda.gov/media/131919/download    Fact sheet for patients: https://www.fda.gov/media/534320/download    Test performed by PCR.          CT Abdomen Pelvis Without Contrast    Result Date: 7/22/2021  CT Abdomen Pelvis WO INDICATION: Weakness and confusion. Infection. TECHNIQUE: CT of the abdomen and pelvis without IV contrast. Coronal and sagittal reconstructions were obtained.  Radiation dose reduction  techniques included automated exposure control or exposure modulation based on body size. Count of known CT and cardiac nuc med studies performed in previous 12 months: 1. COMPARISON: None available. FINDINGS: Abdomen: The liver, spleen and pancreas are normal in size. No evidence of hydronephrosis or kidney stone disease. No adrenal masses. No evidence of retroperitoneal adenopathy. No distended bowel loops are seen. There is a small umbilical hernia that contains fat only. Pelvis: The appendix is not identified but no inflammatory changes are seen in the right lower quadrant. Multiple calcified uterine fibroids are noted. No evidence of pelvic abscess or adenopathy. There is a chronic-appearing partial compression fracture of L1.     Impression: No acute or inflammatory changes are seen in the abdomen or pelvis. Signer Name: Tam Carmen MD  Signed: 7/22/2021 6:13 AM  Workstation Name: [facility]    CT Head Without Contrast    Result Date: 7/22/2021  CT Head WO HISTORY: Weakness and confusion TECHNIQUE: Axial unenhanced head CT. Radiation dose reduction techniques included automated exposure control or exposure modulation based on body size. Count of known CT and cardiac nuc med studies performed in previous 12 months: 0. Time of scan: 5:54 AM COMPARISON: None. FINDINGS: No intracranial hemorrhage, mass, or infarct. No hydrocephalus or extra-axial fluid collection. There are senescent changes, including volume loss and nonspecific white matter change, but no acute abnormality is seen. The skull base, calvarium, and extracranial soft tissues are normal.     Impression: Senescent changes without acute abnormality. Signer Name: Tam Carmen MD  Signed: 7/22/2021 6:07 AM  Workstation Name: [facility]    XR Chest 1 View    Result Date: 7/22/2021  CR Chest 1 Vw INDICATION: Sepsis with hypoxia COMPARISON:  None available. FINDINGS: Single  "portable AP view(s) of the chest.  Cardiomegaly is seen. Postoperative changes of median sternotomy are noted. The lungs are clear with normal vascular markings. No effusions are seen.     Impression: Cardiomegaly. No acute findings in the lungs. Signer Name: Tam Carmen MD  Signed: 7/22/2021 7:21 AM  Workstation [facility]: [facility]          Assessment/Plan   Assessment & Plan     Active Hospital Problems    Diagnosis  POA   • Altered mental status [R41.82]  Yes   • Type 2 diabetes mellitus with hyperglycemia (CMS/HCC) [E11.65]  Yes   • Elevated serum creatinine [R79.89]  Yes   • Hypertensive urgency [I16.0]  Yes       Angeles Hoffman is a 80 y.o. female [place] Islander speaking, with PMH of T2DM, hx of CVA 5 years, CAD s/p MI, and HTN who presented to the ED with AMS. Found to have abnormal UA and hyperglycemia with mild anion gap increase and glucosuria.     Lethargy, confusion   Metabolic encephalopathy   -CTH w/o contrast without acute abnormality , CT a/p without acute intra abdominal abnormality, CXR without acute findings   -likely 2/2 UTI and mild DKA  -will treat with antibiotics, IVF, and insulin and monitor for improvement     Mild DKA  -elevated blood sugar up to 500 per  report  -anion gap mildly elevated   -apparently on glipizide and metformin, but has not been able to refill as they just moved from [facility] and had a difficult time transferring medications here     Abnormal UA  -s/p zosyn in ED, start ceftriaxone, follow up urine cultures     Hypertensive urgency   -nurse got med list from , again not sure if she had been taking medications as she just moved from [facility] and had been \"running low\" on some of the medications, but is unclear which ones  -resume losartan and coreg, also on hydralazine and imdur, but blood pressure improved on the floor so will monitor and resume as appropriate  -labetalol prn     Elevated creatinine  -unknown " baseline   -continue IVF, repeat BMP in AM     Lactic acidosis   -continue IVF, repeat lactic     Mild thrombocytopenia   -continue to monitor CBC  -no s/s of bleeding     ? A fib   -EKG here with NSR  -xarelto on med list, will continue    ?Hypoxia   -no known history of COPD  -CXR without acute abnormality   -wean off oxygen    Hx of CVA  Hx of CAD    DVT prophylaxis:  On xarelto      CODE STATUS:    Code Status and Medical Interventions:   Ordered at: 07/22/21 0851     Code Status:    CPR     Medical Interventions (Level of Support Prior to Arrest):    Full       Admission Status:  I believe this patient meets INPATIENT status due to mild DKA in setting of acute UTI.  I feel patient’s risk for adverse outcomes and need for care warrant INPATIENT evaluation and I predict the patient’s care encounter to likely last beyond 2 midnights.      Arely Moreno MD  07/22/21

## 2021-07-22 NOTE — NURSING NOTE
WOC consult for: MASD/ L middle toe possible DTI     Assessment and Care provided:   1. L middle toe appears as more of a bruise than DTI, continue to keep pressure off  2. Pretty extensive MASD possibly due to incontinence    3. Boggy heels     Recommendation(s): Apply nystatin to buttocks after cleansing, cover with thin layer of z guard, keep RIRI, consider applying purewick, apply heelboots    *Maintain good skin care, keep dry, turn q 2 hr with wedge if possible, keep heels elevated and offloaded with heel boots.    *Apply z-guard to bottom and bony prominences daily and as needed with incontinence episodes.  *Follow C.A.R.E protocol if medical devices (Bipap, wilkerson, Ng tube, etc) are being used.      All skin interventions in place. Patient is on waffle mattress. Head to toe assessment completed. WOC orders placed.  Discussed plan of care with RN. Thank you for consulting WOCN.  Will sign off.  Please call with questions or if needs arise.

## 2021-07-22 NOTE — PLAN OF CARE
Goal Outcome Evaluation:  Plan of Care Reviewed With: patient  SLP evaluation completed. Will continue to address dysphagia. Please see note for further details and recommendations.

## 2021-07-22 NOTE — THERAPY EVALUATION
Acute Care - Speech Language Pathology   Swallow Initial Evaluation [facility]   Clinical Swallow Evaluation       Patient Name: Angeles Hoffman  : 1940  MRN: [id number]  Today's Date: 2021               Admit Date: 2021    Visit Dx:     ICD-10-CM ICD-9-CM   1. Acute sepsis (CMS/HCC)  A41.9 038.9     995.91   2. Pyelonephritis  N12 590.80   3. Hypoxia  R09.02 799.02   4. Altered mental status, unspecified altered mental status type  R41.82 780.97   5. Hyperglycemia  R73.9 790.29   6. Oral phase dysphagia  R13.11 787.21     Patient Active Problem List   Diagnosis   • Altered mental status   • Type 2 diabetes mellitus with hyperglycemia (CMS/HCC)   • Elevated serum creatinine   • Hypertensive urgency     Past Medical History:   Diagnosis Date   • Diabetes mellitus (CMS/HCC)    • Elevated cholesterol    • Hypertension      No past surgical history on file.    SLP Recommendation and Plan  SLP Swallowing Diagnosis: mild, oral dysphagia  SLP Diet Recommendation: soft textures, chopped, thin liquids  Recommended Precautions and Strategies: upright posture during/after eating, small bites of food and sips of liquid (check for oral residue, liquid wash to clear prn)  SLP Rec. for Method of Medication Administration: meds whole, with thin liquids, with pudding or applesauce, as tolerated     Monitor for Signs of Aspiration: yes, notify SLP if any concerns     Swallow Criteria for Skilled Therapeutic Interventions Met: demonstrates skilled criteria  Anticipated Discharge Disposition (SLP): unknown, anticipate therapy at next level of care  Rehab Potential/Prognosis, Swallowing: good, to achieve stated therapy goals  Therapy Frequency (Swallow): PRN  Predicted Duration Therapy Intervention (Days): until discharge          Plan for Continued Treatment (SLP): continue to follow to address diet tolerance                        SWALLOW EVALUATION (last 72 hours)      SLP Adult Swallow Evaluation     Row Name  07/22/21 1280                   Rehab Evaluation    Document Type  evaluation  -        Subjective Information  no complaints  -        Patient Observations  alert;cooperative;agree to therapy  -        Patient/Family/Caregiver Comments/Observations  No family present  -        Care Plan Review  evaluation/treatment results reviewed;care plan/treatment goals reviewed;risks/benefits reviewed;current/potential barriers reviewed;patient/other agree to care plan  -        Patient Effort  adequate  -        Comment  Video  utilized during this evaluation  -        Symptoms Noted During/After Treatment  none  -           General Information    Patient Profile Reviewed  yes  -CH        Pertinent History Of Current Problem  Patient admitted with AMS. PMH of T2DM, hx of CVA 5 years, CAD s/p MI, and HTN . Family reported patient having difficulty with solid consistencies. CSE completed to assess swallow  -        Current Method of Nutrition  regular textures;thin liquids  -        Precautions/Limitations, Vision  WFL;for purposes of eval  -CH        Precautions/Limitations, Hearing  WFL;for purposes of eval  -        Prior Level of Function-Communication  other (see comments) Speaks Jordanian  -        Prior Level of Function-Swallowing  no diet consistency restrictions  -        Barriers to Rehab  language barrier;cognitive status  -        Patient's Goals for Discharge  patient could not state  -           Pain    Additional Documentation  Pain Scale: FACES Pre/Post-Treatment (Group);Pain Scale: Numbers Pre/Post-Treatment (Group)  -           Pain Scale: Numbers Pre/Post-Treatment    Pretreatment Pain Rating  0/10 - no pain  -        Posttreatment Pain Rating  0/10 - no pain  -           Pain Scale: FACES Pre/Post-Treatment    Pain: FACES Scale, Pretreatment  0-->no hurt  -CH        Posttreatment Pain Rating  0-->no hurt  -           Oral Motor Structure and Function     Dentition Assessment  missing teeth  -CH        Secretion Management  WNL/WFL  -CH        Mucosal Quality  moist, healthy  -CH        Volitional Swallow  WFL  -CH           Oral Musculature and Cranial Nerve Assessment    Oral Motor General Assessment  WFL  -CH           Respiratory    Respiratory Status  WFL  -CH           Clinical Swallow Eval    Oral Prep Phase  impaired  -CH        Oral Transit  WFL  -CH        Oral Residue  impaired  -CH        Pharyngeal Phase  no overt signs/symptoms of pharyngeal impairment  -CH        Esophageal Phase  unremarkable  -CH        Clinical Swallow Evaluation Summary  CSE completed. Pt presented with trials of thin liquids via ice/spoon/cup/straw, pureed, soft solid, mixed and regular solid consistencies. Prolonged mastication with regular solid consistency with mild lingual residue which cleared with subsequent swallow or liquid wash. No s/s of aspiration observed with any consistency. Recommend soft, chopped consistency and thin liquids. SLP to continue to follow to assess diet tolerance.   -           Oral Prep Concerns    Oral Prep Concerns  prolonged mastication;increased prep time  -        Prolonged Mastication  regular consistencies  -        Increased Prep Time  regular consistencies  -           Oral Residue Concerns    Oral Residue Concerns  diffuse residue throughout oral cavity  -        Diffuse Residue Throughout Oral Cavity  regular consistencies;other (see comments) cleared with subsequent swallow or liquid wash  -           Clinical Impression    Barriers to Overall Progress (SLP)  language and cognition  -        SLP Swallowing Diagnosis  mild;oral dysphagia  -        Functional Impact  risk of aspiration/pneumonia  -        Rehab Potential/Prognosis, Swallowing  good, to achieve stated therapy goals  -        Swallow Criteria for Skilled Therapeutic Interventions Met  demonstrates skilled criteria  -        Plan for Continued Treatment  (SLP)  continue to follow to address diet tolerance  -           Recommendations    Therapy Frequency (Swallow)  PRN  -        Predicted Duration Therapy Intervention (Days)  until discharge  -        SLP Diet Recommendation  soft textures;chopped;thin liquids  -        Recommended Precautions and Strategies  upright posture during/after eating;small bites of food and sips of liquid check for oral residue, liquid wash to clear prn  -        Oral Care Recommendations  Oral Care BID/PRN;Toothbrush  -        SLP Rec. for Method of Medication Administration  meds whole;with thin liquids;with pudding or applesauce;as tolerated  -        Monitor for Signs of Aspiration  yes;notify SLP if any concerns  -        Anticipated Discharge Disposition (SLP)  unknown;anticipate therapy at next level of care  -          User Key  (r) = Recorded By, (t) = Taken By, (c) = Cosigned By    Initials Name Effective Dates    Natasha Barnes MS CCC-SLP 06/16/21 -           EDUCATION  The patient has been educated in the following areas:   Dysphagia (Swallowing Impairment) Oral Care/Hydration Modified Diet Instruction.       SLP GOALS     Row Name 07/22/21 1350             Oral Nutrition/Hydration Goal 1 (SLP)    Oral Nutrition/Hydration Goal 1, SLP  STG: Patient will tolerate soft, chopped diet consistency and thin liquids without s/s of aspiraiton in 100% of trials w/o verbal cues.   -      Time Frame (Oral Nutrition/Hydration Goal 1, SLP)  by discharge  -         Oral Nutrition/Hydration Goal 2 (SLP)    Oral Nutrition/Hydration Goal 2, SLP  LTG: Patient will tolerate soft, whole solids and thin liquids without s/s of aspiration in 100% of trials without verbal cues.  -      Time Frame (Oral Nutrition/Hydration Goal 2, SLP)  by discharge  -        User Key  (r) = Recorded By, (t) = Taken By, (c) = Cosigned By    Initials Name Provider Type    Natasha Barnes MS CCC-SLP Speech and Language Pathologist              Time Calculation:   Time Calculation- SLP     Row Name 07/22/21 1441             Time Calculation- SLP    SLP Start Time  1350  -CH      SLP Received On  07/22/21  -CH         Untimed Charges    SLP Eval/Re-eval   ST Eval Oral Pharyng Swallow - 02569  -CH      [id number] Eval Oral Pharyng Swallow Minutes  53  -CH         Total Minutes    Untimed Charges Total Minutes  53  -CH       Total Minutes  53  -CH        User Key  (r) = Recorded By, (t) = Taken By, (c) = Cosigned By    Initials Name Provider Type    Natasha Barnes MS CCC-SLP Speech and Language Pathologist          Therapy Charges for Today     Code Description Service Date Service Provider Modifiers Qty    [id number] HC ST EVAL ORAL PHARYNG SWALLOW 4 7/22/2021 Natasha Gonzalez MS CCC-SLP GN 1               Natasha Gonzalez MS CCC-DOC  7/22/2021

## 2021-07-23 LAB
ALBUMIN SERPL-MCNC: 3.3 G/DL (ref 3.5–5.2)
ALBUMIN/GLOB SERPL: 1.4 G/DL
ALP SERPL-CCNC: 62 U/L (ref 39–117)
ALT SERPL W P-5'-P-CCNC: 11 U/L (ref 1–33)
ANION GAP SERPL CALCULATED.3IONS-SCNC: 8 MMOL/L (ref 5–15)
AST SERPL-CCNC: 14 U/L (ref 1–32)
BASOPHILS # BLD AUTO: 0.02 10*3/MM3 (ref 0–0.2)
BASOPHILS NFR BLD AUTO: 0.4 % (ref 0–1.5)
BILIRUB SERPL-MCNC: 0.2 MG/DL (ref 0–1.2)
BUN SERPL-MCNC: 13 MG/DL (ref 8–23)
BUN/CREAT SERPL: 14.4 (ref 7–25)
CALCIUM SPEC-SCNC: 8.5 MG/DL (ref 8.6–10.5)
CHLORIDE SERPL-SCNC: 104 MMOL/L (ref 98–107)
CO2 SERPL-SCNC: 28 MMOL/L (ref 22–29)
CREAT SERPL-MCNC: 0.9 MG/DL (ref 0.57–1)
DEPRECATED RDW RBC AUTO: 48.6 FL (ref 37–54)
EOSINOPHIL # BLD AUTO: 0.05 10*3/MM3 (ref 0–0.4)
EOSINOPHIL NFR BLD AUTO: 1 % (ref 0.3–6.2)
ERYTHROCYTE [DISTWIDTH] IN BLOOD BY AUTOMATED COUNT: 14.3 % (ref 12.3–15.4)
GFR SERPL CREATININE-BSD FRML MDRD: 60 ML/MIN/1.73
GLOBULIN UR ELPH-MCNC: 2.4 GM/DL
GLUCOSE BLDC GLUCOMTR-MCNC: 155 MG/DL (ref 70–130)
GLUCOSE BLDC GLUCOMTR-MCNC: 186 MG/DL (ref 70–130)
GLUCOSE BLDC GLUCOMTR-MCNC: 209 MG/DL (ref 70–130)
GLUCOSE BLDC GLUCOMTR-MCNC: 304 MG/DL (ref 70–130)
GLUCOSE SERPL-MCNC: 158 MG/DL (ref 65–99)
HCT VFR BLD AUTO: 35.1 % (ref 34–46.6)
HGB BLD-MCNC: 10.9 G/DL (ref 12–15.9)
IMM GRANULOCYTES # BLD AUTO: 0.01 10*3/MM3 (ref 0–0.05)
IMM GRANULOCYTES NFR BLD AUTO: 0.2 % (ref 0–0.5)
LYMPHOCYTES # BLD AUTO: 2.2 10*3/MM3 (ref 0.7–3.1)
LYMPHOCYTES NFR BLD AUTO: 41.8 % (ref 19.6–45.3)
MCH RBC QN AUTO: 29.3 PG (ref 26.6–33)
MCHC RBC AUTO-ENTMCNC: 31.1 G/DL (ref 31.5–35.7)
MCV RBC AUTO: 94.4 FL (ref 79–97)
MONOCYTES # BLD AUTO: 0.42 10*3/MM3 (ref 0.1–0.9)
MONOCYTES NFR BLD AUTO: 8 % (ref 5–12)
NEUTROPHILS NFR BLD AUTO: 2.56 10*3/MM3 (ref 1.7–7)
NEUTROPHILS NFR BLD AUTO: 48.6 % (ref 42.7–76)
NRBC BLD AUTO-RTO: 0 /100 WBC (ref 0–0.2)
PLATELET # BLD AUTO: 118 10*3/MM3 (ref 140–450)
PMV BLD AUTO: 13 FL (ref 6–12)
POTASSIUM SERPL-SCNC: 3.7 MMOL/L (ref 3.5–5.2)
PROT SERPL-MCNC: 5.7 G/DL (ref 6–8.5)
RBC # BLD AUTO: 3.72 10*6/MM3 (ref 3.77–5.28)
SODIUM SERPL-SCNC: 140 MMOL/L (ref 136–145)
WBC # BLD AUTO: 5.26 10*3/MM3 (ref 3.4–10.8)

## 2021-07-23 PROCEDURE — 96361 HYDRATE IV INFUSION ADD-ON: CPT

## 2021-07-23 PROCEDURE — 97166 OT EVAL MOD COMPLEX 45 MIN: CPT

## 2021-07-23 PROCEDURE — G0378 HOSPITAL OBSERVATION PER HR: HCPCS

## 2021-07-23 PROCEDURE — 25010000002 CEFTRIAXONE PER 250 MG: Performed by: INTERNAL MEDICINE

## 2021-07-23 PROCEDURE — 63710000001 INSULIN LISPRO (HUMAN) PER 5 UNITS: Performed by: INTERNAL MEDICINE

## 2021-07-23 PROCEDURE — 96367 TX/PROPH/DG ADDL SEQ IV INF: CPT

## 2021-07-23 PROCEDURE — 85025 COMPLETE CBC W/AUTO DIFF WBC: CPT | Performed by: INTERNAL MEDICINE

## 2021-07-23 PROCEDURE — 97162 PT EVAL MOD COMPLEX 30 MIN: CPT

## 2021-07-23 PROCEDURE — 82962 GLUCOSE BLOOD TEST: CPT

## 2021-07-23 PROCEDURE — 99226 PR SBSQ OBSERVATION CARE/DAY 35 MINUTES: CPT | Performed by: INTERNAL MEDICINE

## 2021-07-23 PROCEDURE — 92526 ORAL FUNCTION THERAPY: CPT

## 2021-07-23 PROCEDURE — 80053 COMPREHEN METABOLIC PANEL: CPT | Performed by: INTERNAL MEDICINE

## 2021-07-23 RX ORDER — PREDNISONE 20 MG/1
20 TABLET ORAL DAILY
COMMUNITY

## 2021-07-23 RX ORDER — GLIMEPIRIDE 2 MG/1
2 TABLET ORAL 2 TIMES DAILY
Status: ON HOLD | COMMUNITY
End: 2021-07-25 | Stop reason: SDUPTHER

## 2021-07-23 RX ADMIN — RIVAROXABAN 20 MG: 20 TABLET, FILM COATED ORAL at 17:07

## 2021-07-23 RX ADMIN — INSULIN LISPRO 2 UNITS: 100 INJECTION, SOLUTION INTRAVENOUS; SUBCUTANEOUS at 09:24

## 2021-07-23 RX ADMIN — LOSARTAN POTASSIUM 25 MG: 25 TABLET, FILM COATED ORAL at 09:21

## 2021-07-23 RX ADMIN — INSULIN LISPRO 2 UNITS: 100 INJECTION, SOLUTION INTRAVENOUS; SUBCUTANEOUS at 17:08

## 2021-07-23 RX ADMIN — NYSTATIN: 100000 POWDER TOPICAL at 21:30

## 2021-07-23 RX ADMIN — INSULIN LISPRO 3 UNITS: 100 INJECTION, SOLUTION INTRAVENOUS; SUBCUTANEOUS at 12:06

## 2021-07-23 RX ADMIN — SODIUM CHLORIDE 100 ML/HR: 9 INJECTION, SOLUTION INTRAVENOUS at 06:52

## 2021-07-23 RX ADMIN — SODIUM CHLORIDE 100 ML/HR: 9 INJECTION, SOLUTION INTRAVENOUS at 12:09

## 2021-07-23 RX ADMIN — DOCUSATE SODIUM 50MG AND SENNOSIDES 8.6MG 2 TABLET: 8.6; 5 TABLET, FILM COATED ORAL at 09:21

## 2021-07-23 RX ADMIN — SODIUM CHLORIDE, PRESERVATIVE FREE 10 ML: 5 INJECTION INTRAVENOUS at 12:06

## 2021-07-23 RX ADMIN — NYSTATIN: 100000 POWDER TOPICAL at 12:06

## 2021-07-23 RX ADMIN — CEFTRIAXONE SODIUM 1 G: 1 INJECTION, SOLUTION INTRAVENOUS at 12:08

## 2021-07-23 NOTE — PLAN OF CARE
Goal Outcome Evaluation:  Plan of Care Reviewed With: patient  SLP treatment completed. Will sign-off dysphagia. Please see note for further details and recommendations.

## 2021-07-23 NOTE — PROGRESS NOTES
"    [facility]  PROGRESS NOTE    Patient Name: Angeles Hoffman  : 1940  MRN: [id number]    Date of Admission: 2021  Primary Care Physician: Provider, No Known    Subjective   Subjective     CC:  AMS/UTI/hyperglycemia    HPI:  Patient spoken to with  present. She is still somewhat confused and  notes she \"speaks simply\" and he has a hard time translating for her. Patient overall reports she feels better.     ROS:  Gen- No fevers, chills  CV- No chest pain, palpitations  Resp- No cough, dyspnea  GI- No N/V/D, abd pain         Objective   Objective     Vital Signs:   Temp:  [97.6 °F (36.4 °C)-98.9 °F (37.2 °C)] 97.6 °F (36.4 °C)  Heart Rate:  [57-69] 57  Resp:  [16] 16  BP: (118-138)/(49-66) 127/56  Flow (L/min):  [2] 2     Physical Exam:  GEN- no acute distress noted, resting in bed, awake,  on Ipad  HEENT- atraumatic, normocephalic, eomi  NECK- supple, trachea midline, no masses  RESP: ctab, normal effort  CV: no murmurs, s1/s2, rrr  MSK: no edema noted, spontaneous movement of all extremities  NEURO: alert, oriented, no focal deficits  SKIN: no rashes  PSYCH: appropriate mood and affect       Results Reviewed:  LAB RESULTS:      Lab 21  0409 21  1032 21  0540   WBC 5.26  --  7.99   HEMOGLOBIN 10.9*  --  12.4   HEMATOCRIT 35.1  --  39.9   PLATELETS 118*  --  128*   NEUTROS ABS 2.56  --  6.23   IMMATURE GRANS (ABS) 0.01  --  0.03   LYMPHS ABS 2.20  --  1.23   MONOS ABS 0.42  --  0.46   EOS ABS 0.05  --  0.02   MCV 94.4  --  94.5   PROCALCITONIN  --   --  0.08   LACTATE  --  2.8* 2.6*   PROTIME  --  13.1  --          Lab 21  0409 21  1332 21  0540   SODIUM 140 136 138   POTASSIUM 3.7 3.7 4.4   CHLORIDE 104 95* 95*   CO2 28.0 29.0 27.0   ANION GAP 8.0 12.0 16.0*   BUN 13 17 24*   CREATININE 0.90 0.87 1.20*   GLUCOSE 158* 209* 345*   CALCIUM 8.5* 9.2 9.6   TSH  --   --  1.470         Lab " 07/23/21  0409 07/22/21  0540   TOTAL PROTEIN 5.7* 7.2   ALBUMIN 3.30* 4.30   GLOBULIN 2.4 2.9   ALT (SGPT) 11 19   AST (SGOT) 14 20   BILIRUBIN 0.2 0.3   ALK PHOS 62 86   LIPASE  --  43         Lab 07/22/21  1032 07/22/21  0540   PROBNP  --  800.5   TROPONIN T  --  <0.010   PROTIME 13.1  --    INR 1.02  --                  Lab 07/22/21  0538   PH, ARTERIAL 7.405   PCO2, ARTERIAL 48.4*   PO2 ART 69.6*   FIO2 32   HCO3 ART 30.3*   BASE EXCESS ART 4.7*     Brief Urine Lab Results  (Last result in the past 365 days)      Color   Clarity   Blood   Leuk Est   Nitrite   Protein   CREAT   Urine HCG        07/22/21 0540 Yellow Clear Negative Negative Positive 30 mg/dL (1+)               Microbiology Results Abnormal     Procedure Component Value - Date/Time    Blood Culture - Blood, Arm, Right [[id number]] Collected: 07/22/21 0610    Lab Status: Preliminary result Specimen: Blood from Arm, Right Updated: 07/23/21 0645     Blood Culture No growth at 24 hours    Blood Culture - Blood, Arm, Right [[id number]] Collected: 07/22/21 0600    Lab Status: Preliminary result Specimen: Blood from Arm, Right Updated: 07/23/21 0645     Blood Culture No growth at 24 hours    COVID PRE-OP / PRE-PROCEDURE SCREENING ORDER (NO ISOLATION) - Swab, Nasopharynx [[id number]]  (Normal) Collected: 07/22/21 0540    Lab Status: Final result Specimen: Swab from Nasopharynx Updated: 07/22/21 0616    Narrative:      The following orders were created for panel order COVID PRE-OP / PRE-PROCEDURE SCREENING ORDER (NO ISOLATION) - Swab, Nasopharynx.  Procedure                               Abnormality         Status                     ---------                               -----------         ------                     COVID-19 and FLU A/B PCR...[[id number]]  Normal              Final result                 Please view results for these tests on the individual orders.    COVID-19 and FLU A/B PCR - Swab, Nasopharynx [[id number]]  (Normal) Collected: 07/22/21 0540     Lab Status: Final result Specimen: Swab from Nasopharynx Updated: 07/22/21 0616     COVID19 Not Detected     Influenza A PCR Not Detected     Influenza B PCR Not Detected    Narrative:      Fact sheet for providers: https://www.fda.gov/media/698676/download    Fact sheet for patients: https://www.fda.gov/media/027535/download    Test performed by PCR.          CT Abdomen Pelvis Without Contrast    Result Date: 7/22/2021  CT Abdomen Pelvis WO INDICATION: Weakness and confusion. Infection. TECHNIQUE: CT of the abdomen and pelvis without IV contrast. Coronal and sagittal reconstructions were obtained.  Radiation dose reduction techniques included automated exposure control or exposure modulation based on body size. Count of known CT and cardiac nuc med studies performed in previous 12 months: 1. COMPARISON: None available. FINDINGS: Abdomen: The liver, spleen and pancreas are normal in size. No evidence of hydronephrosis or kidney stone disease. No adrenal masses. No evidence of retroperitoneal adenopathy. No distended bowel loops are seen. There is a small umbilical hernia that contains fat only. Pelvis: The appendix is not identified but no inflammatory changes are seen in the right lower quadrant. Multiple calcified uterine fibroids are noted. No evidence of pelvic abscess or adenopathy. There is a chronic-appearing partial compression fracture of L1.     Impression: No acute or inflammatory changes are seen in the abdomen or pelvis. Signer Name: Tam Carmen MD  Signed: 7/22/2021 6:13 AM  Workstation Name: [email or web address]  [facility]    CT Head Without Contrast    Result Date: 7/22/2021  CT Head WO HISTORY: Weakness and confusion TECHNIQUE: Axial unenhanced head CT. Radiation dose reduction techniques included automated exposure control or exposure modulation based on body size. Count of known CT and cardiac nuc med studies performed in previous 12 months: 0. Time of scan: 5:54 AM  COMPARISON: None. FINDINGS: No intracranial hemorrhage, mass, or infarct. No hydrocephalus or extra-axial fluid collection. There are senescent changes, including volume loss and nonspecific white matter change, but no acute abnormality is seen. The skull base, calvarium, and extracranial soft tissues are normal.     Impression: Senescent changes without acute abnormality. Signer Name: Tam Carmen MD  Signed: 7/22/2021 6:07 AM  Workstation Name: [facility]  [facility]    XR Chest 1 View    Result Date: 7/22/2021  CR Chest 1 Vw INDICATION: Sepsis with hypoxia COMPARISON:  None available. FINDINGS: Single portable AP view(s) of the chest.  Cardiomegaly is seen. Postoperative changes of median sternotomy are noted. The lungs are clear with normal vascular markings. No effusions are seen.     Impression: Cardiomegaly. No acute findings in the lungs. Signer Name: Tam Carmen MD  Signed: 7/22/2021 7:21 AM  Workstation Name: [facility]  [facility]          I have reviewed the medications:  Scheduled Meds:carvedilol, 6.25 mg, Oral, BID With Meals  cefTRIAXone, 1 g, Intravenous, Q24H  insulin lispro, 0-7 Units, Subcutaneous, TID AC  losartan, 25 mg, Oral, Q24H  nystatin, , Topical, Q12H  rivaroxaban, 20 mg, Oral, Daily With Dinner  senna-docusate sodium, 2 tablet, Oral, BID  sodium chloride, 10 mL, Intravenous, Q12H      Continuous Infusions:sodium chloride, 100 mL/hr, Last Rate: 100 mL/hr (07/23/21 0652)      PRN Meds:.•  acetaminophen **OR** acetaminophen **OR** acetaminophen  •  senna-docusate sodium **AND** polyethylene glycol **AND** bisacodyl **AND** bisacodyl  •  dextrose  •  dextrose  •  glucagon (human recombinant)  •  labetalol  •  sodium chloride    Assessment/Plan   Assessment & Plan     Active Hospital Problems    Diagnosis  POA   • Altered mental status [R41.82]  Yes   • Type 2 diabetes mellitus with hyperglycemia (CMS/HCC) [E11.65]  Yes   • Elevated serum  "creatinine [R79.89]  Yes   • Hypertensive urgency [I16.0]  Yes      Resolved Hospital Problems   No resolved problems to display.        Brief Hospital Course to date:  Angeles Hoffman is a 80 y.o. female [place] speaking, with PMH of T2DM, hx of CVA 5 years, CAD s/p MI, and HTN who presented to the ED with AMS. Found to have abnormal UA and hyperglycemia with mild anion gap increase and glucosuria.     This patient's problems and plans were partially entered by my partner and updated as appropriate by me 07/23/21.         Lethargy, confusion   Metabolic encephalopathy -- seem improved  -CTH w/o contrast without acute abnormality , CT a/p without acute intra abdominal abnormality, CXR without acute findings   -likely 2/2 UTI and mild DKA  -will treat with antibiotics, IVF, and insulin and monitor for improvement -- seems to be back to baseline per patient, will try to d/w family      Mild DKA-- resolved  -elevated blood sugar up to 500 per  report  -anion gap mildly elevated on presentation  -apparently on glipizide and metformin, but has not been able to refill as they just moved from [facility] and had a difficult time transferring medications here , have d/w pharmacy to review and look into medications  --blood sugars much improved on minimal SSI- will check A1C     UTI- GNB  -s/p zosyn in ED, start ceftriaxone, follow up urine cultures   -cx prelim with >100k 2 types of GNB     Hypertensive urgency --better  -nurse got med list from , again not sure if she had been taking medications as she just moved from [facility] and had been \"running low\" on some of the medications, but is unclear which ones  -resume losartan and coreg, also on hydralazine and imdur, but blood pressure improved on the floor so will monitor and resume as appropriate  -labetalol prn   - on losartan alone and btter- monitor      Elevated creatinine-- normalized  -unknown baseline   -continue IVF, repeat BMP in AM "      Lactic acidosis   -continue IVF, repeat lactic - still elevated but clinically looks good      Mild thrombocytopenia   -continue to monitor CBC  -no s/s of bleeding      ? A fib   -EKG here with NSR  -xarelto on med list, will continue        Hx of CVA  Hx of CAD       Attempted to call Sara at number listed (daughter) and  via number listed at [phone]- VM obtained on both numbers.     Addendum- d/w pharmacy, [facility], who has obtained med list with several anti-diabetic agents recently filled 6/11 by local PCP in [place]. Patient also filled prednisone 20mg qday for 30 day supply- not sure why she is on this, but possible contributing to hyperglycemia???       DVT prophylaxis:  Medical and mechanical DVT prophylaxis orders are present.       Disposition: I expect the patient to be discharged pending further improvement/urine cx. Plan is home with     CODE STATUS:   Code Status and Medical Interventions:   Ordered at: 07/22/21 0851     Code Status:    CPR     Medical Interventions (Level of Support Prior to Arrest):    Full       Yumiko Garsia MD  07/23/21

## 2021-07-23 NOTE — PLAN OF CARE
Goal Outcome Evaluation:  Plan of Care Reviewed With: patient           Outcome Summary: OT eval completed. Pt presents with generalized weakness and decreased activity tolerance limiting ADL's. Pt able to self-feed breakfast sitting up in bed with KESSLER, performed supine to sit with MinAx2, washed hands and face seated at EOB with KESSLER/SUP. Pt reports needing Sree for ADL's at baseline. OT to follow to progress self-care and safety. Recommend home with assist and HHOT at discharge.

## 2021-07-23 NOTE — PLAN OF CARE
Goal Outcome Evaluation:               Interpretor I pad used to communicate.  Interpretor had difficulty understanding patient during conversation.  Patient has rested in bed and worked with PT.  Was able to stand and take steps in place.  No complaints of pain or nausea this shift.  Pt is alert & oriented except to time.  IV Atb for infection.  VSS will continue to monitor.

## 2021-07-23 NOTE — PROGRESS NOTES
"Clinical Nutrition   Reason For Visit: Identified at risk by screening criteria, MST score 2+, \"Unsure\" unintentional weight loss    Patient Name: Angeles Hoffman  YOB: 1940  MRN: [id number]  Date of Encounter: 07/23/21 14:04 EDT  Admission date: 7/22/2021      Nutrition Assessment     Admission Problem List:  Altered mental status/metabolic encephalopathy  Elevated serum creatinine  Hypertensive urgency  Lethargy  UTI  Mild DKA  TCP      Applicable PMH:  HTN, HLD  T2DM  CVA  CAD  MI      Reported/Observed/Food/Nutrition Related History   Patient speaks [place] language and requires . RD and patient used the  tool that is provided in patient's room. No family present. Patient reports she has been eating well/normal prior to this admission. Denies recent unintentional weight loss. Reports UBW ~250 lbs. Bed scale not working. Denies food allergies and difficulty chewing/swallowing. Consumed ~50% of breakfast meal tray this morning. Reports appetite fine.    Anthropometrics   Height: 70 in  Weight: 230 lbs (estimated weight 7/22 per ns doc)   BMI: 33.0  BMI classification: Obese Class I: 30-34.9kg/m2   IBW: 150 lbs    UBW: pt reports ~250 lbs  Weight change: patient denies recent unintentional weight loss    Labs reviewed   Labs reviewed: Yes    Medications reviewed   Medications reviewed: Yes  Pertinent: antibiotic, insulin, pericolace  GTT: NS @ 100 ml/hr    Current Nutrition Prescription   PO: Diet Soft Texture; Chopped; Thin    Average PO intake: insufficient data    Nutrition Diagnosis     Problem No nutrition diagnosis at this time   Etiology    Signs/Symptoms      Intervention   Intervention: Follow treatment progress, Care plan reviewed, Interview for preferences    -Ordered measured weight.    Goal:   General: Nutrition support treatment  PO: Establish PO    Monitoring/Evaluation:   Monitoring/Evaluation: Per protocol    Ludy Alba RD  Time Spent: 45 min  "

## 2021-07-23 NOTE — THERAPY EVALUATION
Patient Name: Angeles Hoffman  : 1940    MRN: [id number]                              Today's Date: 2021       Admit Date: 2021    Visit Dx:     ICD-10-CM ICD-9-CM   1. Acute sepsis (CMS/HCC)  A41.9 038.9     995.91   2. Pyelonephritis  N12 590.80   3. Hypoxia  R09.02 799.02   4. Altered mental status, unspecified altered mental status type  R41.82 780.97   5. Hyperglycemia  R73.9 790.29   6. Oral phase dysphagia  R13.11 787.21   7. Impaired functional mobility, balance, gait, and endurance  Z74.09 V49.89     Patient Active Problem List   Diagnosis   • Altered mental status   • Type 2 diabetes mellitus with hyperglycemia (CMS/[facility])   • Elevated serum creatinine   • Hypertensive urgency     Past Medical History:   Diagnosis Date   • Diabetes mellitus (CMS/[facility])    • Elevated cholesterol    • Hypertension      No past surgical history on file.  General Information     [facility] Name 21 0907          Physical Therapy Time and Intention    Document Type  evaluation  -     Mode of Treatment  physical therapy  -[facility] Name 21 09          General Information    Patient Profile Reviewed  yes  -     Prior Level of Function  mod assist:;transfer;bed mobility pt relates to  that she would use RW to get OOB to bathroom. Unsure of how much assistance she required to do this.  -     Existing Precautions/Restrictions  fall  -     Barriers to Rehab  medically complex;previous functional deficit;cognitive status;language barrier  -[facility] Name 21 0907          Living Environment    Lives With  spouse  -[facility] Name 21 09          Home Main Entrance    Number of Stairs, Main Entrance  none  -[facility] Name 21 0907          Stairs Within Home, Primary    Number of Stairs, Within Home, Primary  none  -[facility] Name 21 0907          Cognition    Orientation Status (Cognition)  oriented to;person;place;disoriented to;situation;time  -[facility] Name 21  0907          Safety Issues, Functional Mobility    Safety Issues Affecting Function (Mobility)  awareness of need for assistance;insight into deficits/self-awareness;safety precaution awareness;friction/shear risk  -SJ     Impairments Affecting Function (Mobility)  balance;cognition;strength  -SJ     Cognitive Impairments, Mobility Safety/Performance  insight into deficits/self-awareness;safety precaution awareness;awareness, need for assistance  -     Comment, Safety Issues/Impairments (Mobility)  needs Jordanian   -       User Key  (r) = Recorded By, (t) = Taken By, (c) = Cosigned By    Initials Name Provider Type    Shelbie De Oliveira PT Physical Therapist        Mobility     Row Name 07/23/21 0950          Bed Mobility    Bed Mobility  supine-sit;sit-supine  -SJ     Supine-Sit [place] (Bed Mobility)  minimum assist (75% patient effort);2 person assist;verbal cues  -SJ     Sit-Supine [place] (Bed Mobility)  minimum assist (75% patient effort);2 person assist;verbal cues  -SJ     Assistive Device (Bed Mobility)  bed rails;draw sheet;head of bed elevated  -     Comment (Bed Mobility)  extra time and effort needed to complete.  -     Row Name 07/23/21 0950          Transfers    Comment (Transfers)  STS x2 reps with RW with Ludwig x2. Extra time and effort required for all mobility due to need for Jordanian <> English interpretation.  -     Row Name 07/23/21 0950          Sit-Stand Transfer    Sit-Stand [place] (Transfers)  minimum assist (75% patient effort);2 person assist;verbal cues;nonverbal cues (demo/gesture)  -     Assistive Device (Sit-Stand Transfers)  walker, front-wheeled  -SJ     Row Name 07/23/21 0950          Gait/Stairs (Locomotion)    [place] Level (Gait)  minimum assist (75% patient effort);2 person assist  -     Assistive Device (Gait)  walker, front-wheeled  -SJ     Distance in Feet (Gait)  2ft  -SJ     Deviations/Abnormal Patterns (Gait)  weight shifting  decreased;right sided deviations  -SJ     Right Sided Gait Deviations  foot drop/toe drag;weight shift ability decreased  -SJ     Comment (Gait/Stairs)  Pt sidestepped to HOB with cues and assist for weight shifting  -SJ       User Key  (r) = Recorded By, (t) = Taken By, (c) = Cosigned By    Initials Name Provider Type    Shelbie De Oliveira PT Physical Therapist        Obj/Interventions     [facility] Name 07/23/21 0953          Range of Motion Comprehensive    General Range of Motion  bilateral lower extremity ROM WFL  -SJ     Row Name 07/23/21 0953          Strength Comprehensive (MMT)    General Manual Muscle Testing (MMT) Assessment  lower extremity strength deficits identified  -SJ     Comment, General Manual Muscle Testing (MMT) Assessment  RLE 4-/5, LLE 4/5  -SJ     [facility] Name 07/23/21 0953          Balance    Balance Assessment  sitting static balance;standing static balance  -SJ     Static Sitting Balance  WFL;unsupported;supported;sitting, edge of bed  -SJ     Static Standing Balance  mild impairment;supported  -SJ     Balance Interventions  sit to stand;weight shifting activity  -SJ       User Key  (r) = Recorded By, (t) = Taken By, (c) = Cosigned By    Initials Name Provider Type    Shelbie De Oliveira, PT Physical Therapist        Goals/Plan     Row Name 07/23/21 0957          Bed Mobility Goal 1 (PT)    Activity/Assistive Device (Bed Mobility Goal 1, PT)  sit to supine;supine to sit  -SJ     [place] Level/Cues Needed (Bed Mobility Goal 1, PT)  modified independence  -SJ     Time Frame (Bed Mobility Goal 1, PT)  long term goal (LTG);2 weeks  -SJ     Row Name 07/23/21 0957          Transfer Goal 1 (PT)    Activity/Assistive Device (Transfer Goal 1, PT)  sit-to-stand/stand-to-sit;bed-to-chair/chair-to-bed  -SJ     [place] Level/Cues Needed (Transfer Goal 1, PT)  contact guard assist;1 person assist  -SJ     Time Frame (Transfer Goal 1, PT)  long term goal (LTG);2 weeks  -[facility] Name 07/23/21 0939           Gait Training Goal 1 (PT)    Activity/Assistive Device (Gait Training Goal 1, PT)  gait (walking locomotion);walker, rolling;decrease fall risk  -SJ     [place] Level (Gait Training Goal 1, PT)  minimum assist (75% or more patient effort);2 person assist  -SJ     Distance (Gait Training Goal 1, PT)  10ft  -SJ     Time Frame (Gait Training Goal 1, PT)  long term goal (LTG);2 weeks  -SJ       User Key  (r) = Recorded By, (t) = Taken By, (c) = Cosigned By    Initials Name Provider Type    SJ Shelbie Lewis, PT Physical Therapist        Clinical Impression     Row Name 07/23/21 0954          Pain    Additional Documentation  Pain Scale: Numbers Pre/Post-Treatment (Group)  -     Row Name 07/23/21 0954          Pain Scale: Numbers Pre/Post-Treatment    Pretreatment Pain Rating  0/10 - no pain  -SJ     Posttreatment Pain Rating  0/10 - no pain  -     Row Name 07/23/21 0954          Plan of Care Review    Plan of Care Reviewed With  patient  -SJ     Outcome Summary  PT eval completed. Pt presents with weakness, decreased functional mobility independence. Extra time and effort required for mobility due to need for Mauritian <> English . Pt req assist x2 for bed mobility, STS x2 reps with assist x2, and able to sidestep to HOB with RW. PT rec d/c home with HHPT vs rehab pending on family's ability to assist. No family present at time of eval.  -     Row Name 07/23/21 0954          Therapy Assessment/Plan (PT)    Patient/Family Therapy Goals Statement (PT)  feel better  -SJ     Rehab Potential (PT)  good, to achieve stated therapy goals  -     Criteria for Skilled Interventions Met (PT)  yes;skilled treatment is necessary  -     Row Name 07/23/21 0954          Positioning and Restraints    Pre-Treatment Position  in bed  -SJ     Post Treatment Position  bed  -SJ     In Bed  fowlers;call light within reach;encouraged to call for assist;exit alarm on;with other staff;with nsg;heels elevated   -       User Key  (r) = Recorded By, (t) = Taken By, (c) = Cosigned By    Initials Name Provider Type     Shelbie Lewis PT Physical Therapist        Outcome Measures     Row Name 07/23/21 0958 07/23/21 0815       How much help from another person do you currently need...    Turning from your back to your side while in flat bed without using bedrails?  3  -SJ  3  -PS    Moving from lying on back to sitting on the side of a flat bed without bedrails?  2  -SJ  2  -PS    Moving to and from a bed to a chair (including a wheelchair)?  2  -SJ  2  -PS    Standing up from a chair using your arms (e.g., wheelchair, bedside chair)?  2  -SJ  2  -PS    Climbing 3-5 steps with a railing?  1  -SJ  2  -PS    To walk in hospital room?  2  -SJ  2  -PS    AM-PAC 6 Clicks Score (PT)  12  -SJ  13  -PS    Row Name 07/23/21 0958          Functional Assessment    Outcome Measure Options  AM-PAC 6 Clicks Basic Mobility (PT)  -       User Key  (r) = Recorded By, (t) = Taken By, (c) = Cosigned By    Initials Name Provider Type    Shelbie De Oliveira PT Physical Therapist    Rachelle Wing RN Registered Nurse                       Physical Therapy Education                 Title: PT OT SLP Therapies (Done)     Topic: Physical Therapy (Done)     Point: Mobility training (Done)     Learning Progress Summary           Patient Acceptance, E,I, VU,NR by  at 7/23/2021 0958                   Point: Home exercise program (Done)     Learning Progress Summary           Patient Acceptance, E,I, VU,NR by  at 7/23/2021 0958                   Point: Body mechanics (Done)     Learning Progress Summary           Patient Acceptance, E,I, VU,NR by  at 7/23/2021 0958                   Point: Precautions (Done)     Learning Progress Summary           Patient Acceptance, E,I, VU,NR by  at 7/23/2021 0958                               User Key     Initials Effective Dates Name Provider Type [facility] 06/16/21 -  Shelbie Lewis PT  Physical Therapist PT              PT Recommendation and Plan  Planned Therapy Interventions (PT): balance training, bed mobility training, gait training, home exercise program, strengthening, patient/family education, transfer training  Plan of Care Reviewed With: patient  Outcome Summary: PT eval completed. Pt presents with weakness, decreased functional mobility independence. Extra time and effort required for mobility due to need for Micronesian <> English . Pt req assist x2 for bed mobility, STS x2 reps with assist x2, and able to sidestep to HOB with RW. PT rec d/c home with HHPT vs rehab pending on family's ability to assist. No family present at time of eval.     Time Calculation:   PT Charges     Row Name 07/23/21 0959             Time Calculation    Start Time  0907  -      PT Non-Billable Time (min)  46 min  -      PT Received On  07/23/21  -      PT Goal Re-Cert Due Date  08/02/21  -        User Key  (r) = Recorded By, (t) = Taken By, (c) = Cosigned By    Initials Name Provider Type     Shelbie Lewis PT Physical Therapist        Therapy Charges for Today     Code Description Service Date Service Provider Modifiers Qty    [id number]  PT EVAL MOD COMPLEXITY 4 7/23/2021 Shelbie Lewis PT GP 1          PT G-Codes  Outcome Measure Options: AM-PAC 6 Clicks Basic Mobility (PT)  AM-PAC 6 Clicks Score (PT): 12    Shelbie Lewsi PT  7/23/2021

## 2021-07-23 NOTE — DISCHARGE PLACEMENT REQUEST
" Contact Renetta Napier [phone]    Elder Fletcher (80 y.o. Female)     Date of Birth Social Security Number Address Home Phone MRN    1940  [address] [facility] [address] [phone] [id number]    Bahai Marital Status          None        Admission Date Admission Type Admitting Provider Attending Provider Department, Room/Bed    7/22/21 Emergency Arely Moreno MD Hunter, Sarah M, MD [address], S583/1    Discharge Date Discharge Disposition Discharge Destination                       Attending Provider: Yumiko Garsia MD    Allergies: Not on File    Isolation: None   Infection: None   Code Status: CPR    Ht: 177.8 cm (70\")   Wt: 104 kg (230 lb)    Admission Cmt: None   Principal Problem: None                Active Insurance as of 7/22/2021     Primary Coverage     Payor Plan Insurance Group Employer/Plan Group    [facility] MEDICARE REPLACEMENT [facility] MEDICARE REPLACEMENT [id number]     Payor Plan Address Payor Plan Phone Number Payor Plan Fax Number Effective Dates    [address] [address]   1/1/2021 - None Entered    [facility] [id number]       Subscriber Name Subscriber Birth Date Member ID       Elder Fletcher 1940 [id number]           Secondary Coverage     Payor Plan Insurance Group Employer/Plan Group    [facility] COMMUNITY PLAN [facility] COMMUNITY PLAN [facility]      Payor Plan Address Payor Plan Phone Number Payor Plan Fax Number Effective Dates    [address]   1/1/2021 - None Entered    [facility] [address]       Subscriber Name Subscriber Birth Date Member ID       ELDER FLETCHER 1940 [id number]                 Emergency Contacts      (Rel.) Home Phone Work Phone Mobile Phone    Yasmin (Spouse) [phone] -- --    Sara, Daughter (Daughter) -- -- [phone]          [address] [address]  Phone:  [phone]  Fax:  [phone] Date: Jul 23, 2021 "      Ambulatory Referral to Home Health     Patient:  Angeles Hoffman MRN:  [id number]   [address] [facility]  [place] [address] :  1940  SSN:    Phone: [phone] Sex:  F      INSURANCE PAYOR PLAN GROUP # SUBSCRIBER ID   Primary:  Secondary:    [employer] MEDICARE REPLACEMENT [employer] COMMUNITY [place]AN [place] [id number]  [id number] [facility]    [id number]  [id number]      Referring Provider Information:  JOCELINE CUI Phone: [phone] Fax: [phone]      Referral Information:   # Visits:  999 Referral Type: Home Health [42]   Urgency:  Routine Referral Reason: Specialty Services Required   Start Date: 2021 End Date:  To be determined by Insurer   Diagnosis: Acute sepsis (CMS/HCC) (A41.9 [ICD-10-CM] 038.9,995.91 [ICD-9-CM])      Refer to Dept:   Refer to Provider:   Refer to Facility:       Face to Face Visit Date: 2021  Follow-up provider for Plan of Care? I treated the patient in an acute care facility and will not continue treatment after discharge.  Follow-up provider: DEISI DALEY [[id number]]  Reason/Clinical Findings: AMS/UTI/hyperglycemia  Describe mobility limitations that make leaving home difficult: Impaired functional gait, balance and endurance  Nursing/Therapeutic Services Requested: Physical Therapy  Nursing/Therapeutic Services Requested: Occupational Therapy  PT orders: Strengthening  PT orders: Home safety assessment  PT orders: Ultrasound  PT orders: Gait Training  Weight Bearing Status: As Tolerated  Occupational orders: Activities of daily living  Occupational orders: Home safety assessment  Frequency: 1 Week 1     This document serves as a request of services and does not constitute Insurance authorization or approval of services.  To determine eligibility, please contact the members Insurance carrier to verify and review coverage.     If you have medical questions regarding this request for services. Please contact [address] at [phone] during normal  business hours.       Verbal Order Mode: Verbal with readback   Authorizing Provider: Yumiko Garsia MD  Authorizing Provider's NPI: [id number]     Order Entered By: Ernestina Morales RN 2021  3:57 PM                History & Physical      Arely Moreno MD at 21 0928              [facility] Medicine Services  HISTORY AND PHYSICAL    Patient Name: Angeles Hoffman  : 1940  MRN: [id number]  Primary Care Physician: Provider, No Known  Date of admission: 2021      Subjective   Subjective     Chief Complaint:  Altered mental status     HPI:  Angeles Hoffman is a 80 y.o. female Citizen of [place] speaking, with PMH of T2DM, hx of CVA 5 years, CAD s/p MI, and HTN who presented to the ED with AMS. History very limited due to language barrier and AMS. In the ED several attempts were made to speak with patient via , but they were unsuccessful in getting a response. Nurse Enoch was able to get a hold of her , who had a friend with him who was able to help interpret. Patient had blood glucose up to 500 last night, didn't start feeling bad until 2 AM, when she became more lethargic, vomited x2, and was hard to wake up. That is when he brought her to the hospital. I was able to speak with daughter, Sara, she was not with them at the time, but she speaks english and will try to stop by after work. Has been running low on medications as they recently moved from [facility].    COVID Details:    Symptoms:    [x] NONE [] Fever []  Cough [] Shortness of breath [] Change in taste/smell      The patient qualifies to receive the vaccine, but they have not yet received it.      Review of Systems   Unable to obtain due to patients mental status / language barrier  All other systems reviewed and are negative.     Personal History     Past Medical History:   Diagnosis Date   • Diabetes mellitus (CMS/HCC)    • Elevated cholesterol    • Hypertension        No past surgical history on  file.    Family History:  family history is not on file. Otherwise pertinent FHx was reviewed and unremarkable.     Social History:  reports that she has never smoked. She does not have any smokeless tobacco history on file. She reports that she does not drink alcohol and does not use drugs.  Social History     Social History Narrative   • Not on file       Medications:  Available home medication information reviewed.  No medications prior to admission.       Not on File    Objective   Objective     Vital Signs:   Temp:  [98 °F (36.7 °C)-99.9 °F (37.7 °C)] 98 °F (36.7 °C)  Heart Rate:  [74-89] 76  Resp:  [20-26] 22  BP: (131-216)/() 131/79  Flow (L/min):  [3-4] 4       Physical Exam   Constitutional: No acute distress, laying in bed eyes closed, will open eyes spontaenously to verbal stimuli  HENT: NCAT, mucous membranes moist  Respiratory: Clear to auscultation bilaterally, respiratory effort normal  Cardiovascular: RRR, no murmurs  Gastrointestinal: Positive bowel sounds, soft, nontender, nondistended  Psychiatric: uncooperative  Neurologic: disoriented, was unable to understand Greek , moving extremities equally  Skin: No rashes    Result Review:  I have personally reviewed the results from the time of this admission to 7/22/2021 12:30 EDT and agree with these findings:  [x]  Laboratory  []  Microbiology  [x]  Radiology  []  EKG/Telemetry   []  Cardiology/Vascular   []  Pathology  []  Old records  []  Other:  Most notable findings include:       LAB RESULTS:      Lab 07/22/21  1032 07/22/21  0540   WBC  --  7.99   HEMOGLOBIN  --  12.4   HEMATOCRIT  --  39.9   PLATELETS  --  128*   NEUTROS ABS  --  6.23   IMMATURE GRANS (ABS)  --  0.03   LYMPHS ABS  --  1.23   MONOS ABS  --  0.46   EOS ABS  --  0.02   MCV  --  94.5   PROCALCITONIN  --  0.08   LACTATE 2.8* 2.6*   PROTIME 13.1  --    INR 1.02  --          Lab 07/22/21  0540   SODIUM 138   POTASSIUM 4.4   CHLORIDE 95*   CO2 27.0   ANION GAP 16.0*    BUN 24*   CREATININE 1.20*   GLUCOSE 345*   CALCIUM 9.6   TSH 1.470         Lab 07/22/21 0540   TOTAL PROTEIN 7.2   ALBUMIN 4.30   GLOBULIN 2.9   ALT (SGPT) 19   AST (SGOT) 20   BILIRUBIN 0.3   ALK PHOS 86   LIPASE 43         Lab 07/22/21  0540   PROBNP 800.5   TROPONIN T <0.010                 Lab 07/22/21  0538   PH, ARTERIAL 7.405   PCO2, ARTERIAL 48.4*   PO2 ART 69.6*   FIO2 32   HCO3 ART 30.3*   BASE EXCESS ART 4.7*     UA    Urinalysis 7/22/21 7/22/21 0540 0540   Squamous Epithelial Cells, UA  0-2   Specific Gravity, UA 1.014    Ketones, UA Negative    Blood, UA Negative    Leukocytes, UA Negative    Nitrite, UA Positive (A)    RBC, UA  0-2   WBC, UA  0-2   Bacteria, UA  4+ (A)   (A) Abnormal value              Microbiology Results (last 10 days)     Procedure Component Value - Date/Time    COVID PRE-OP / PRE-PROCEDURE SCREENING ORDER (NO ISOLATION) - Swab, Nasopharynx [[id number]]  (Normal) Collected: 07/22/21 0540    Lab Status: Final result Specimen: Swab from Nasopharynx Updated: 07/22/21 0616    Narrative:      The following orders were created for panel order COVID PRE-OP / PRE-PROCEDURE SCREENING ORDER (NO ISOLATION) - Swab, Nasopharynx.  Procedure                               Abnormality         Status                     ---------                               -----------         ------                     COVID-19 and FLU A/B PCR...[[id number]]  Normal              Final result                 Please view results for these tests on the individual orders.    COVID-19 and FLU A/B PCR - Swab, Nasopharynx [[id number]]  (Normal) Collected: 07/22/21 0540    Lab Status: Final result Specimen: Swab from Nasopharynx Updated: 07/22/21 0616     COVID19 Not Detected     Influenza A PCR Not Detected     Influenza B PCR Not Detected    Narrative:      Fact sheet for providers: https://www.fda.gov/media/373677/download    Fact sheet for patients: https://www.fda.gov/media/509684/download    Test performed by  PCR.          CT Abdomen Pelvis Without Contrast    Result Date: 7/22/2021  CT Abdomen Pelvis WO INDICATION: Weakness and confusion. Infection. TECHNIQUE: CT of the abdomen and pelvis without IV contrast. Coronal and sagittal reconstructions were obtained.  Radiation dose reduction techniques included automated exposure control or exposure modulation based on body size. Count of known CT and cardiac nuc med studies performed in previous 12 months: 1. COMPARISON: None available. FINDINGS: Abdomen: The liver, spleen and pancreas are normal in size. No evidence of hydronephrosis or kidney stone disease. No adrenal masses. No evidence of retroperitoneal adenopathy. No distended bowel loops are seen. There is a small umbilical hernia that contains fat only. Pelvis: The appendix is not identified but no inflammatory changes are seen in the right lower quadrant. Multiple calcified uterine fibroids are noted. No evidence of pelvic abscess or adenopathy. There is a chronic-appearing partial compression fracture of L1.     Impression: No acute or inflammatory changes are seen in the abdomen or pelvis. Signer Name: Tam Carmen MD  Signed: 7/22/2021 6:13 AM  Workstation Name: [id number]-  [facility]    CT Head Without Contrast    Result Date: 7/22/2021  CT Head WO HISTORY: Weakness and confusion TECHNIQUE: Axial unenhanced head CT. Radiation dose reduction techniques included automated exposure control or exposure modulation based on body size. Count of known CT and cardiac nuc med studies performed in previous 12 months: 0. Time of scan: 5:54 AM COMPARISON: None. FINDINGS: No intracranial hemorrhage, mass, or infarct. No hydrocephalus or extra-axial fluid collection. There are senescent changes, including volume loss and nonspecific white matter change, but no acute abnormality is seen. The skull base, calvarium, and extracranial soft tissues are normal.     Impression: Senescent changes without  acute abnormality. Signer Name: Tam Carmen MD  Signed: 7/22/2021 6:07 AM  Workstation Name: [id number]-  [facility]    XR Chest 1 View    Result Date: 7/22/2021  CR Chest 1 Vw INDICATION: Sepsis with hypoxia COMPARISON:  None available. FINDINGS: Single portable AP view(s) of the chest.  Cardiomegaly is seen. Postoperative changes of median sternotomy are noted. The lungs are clear with normal vascular markings. No effusions are seen.     Impression: Cardiomegaly. No acute findings in the lungs. Signer Name: Tam Carmen MD  Signed: 7/22/2021 7:21 AM  Workstation Name: [id number]-  [facility]          Assessment/Plan   Assessment & Plan     Active Hospital Problems    Diagnosis  POA   • Altered mental status [R41.82]  Yes   • Type 2 diabetes mellitus with hyperglycemia (CMS/HCC) [E11.65]  Yes   • Elevated serum creatinine [R79.89]  Yes   • Hypertensive urgency [I16.0]  Yes       Angeles Hoffman is a 80 y.o. female [place] speaking, with PMH of T2DM, hx of CVA 5 years, CAD s/p MI, and HTN who presented to the ED with AMS. Found to have abnormal UA and hyperglycemia with mild anion gap increase and glucosuria.     Lethargy, confusion   Metabolic encephalopathy   -CTH w/o contrast without acute abnormality , CT a/p without acute intra abdominal abnormality, CXR without acute findings   -likely 2/2 UTI and mild DKA  -will treat with antibiotics, IVF, and insulin and monitor for improvement     Mild DKA  -elevated blood sugar up to 500 per  report  -anion gap mildly elevated   -apparently on glipizide and metformin, but has not been able to refill as they just moved from [facility] and had a difficult time transferring medications here     Abnormal UA  -s/p zosyn in ED, start ceftriaxone, follow up urine cultures     Hypertensive urgency   -nurse got med list from , again not sure if she had been taking medications as she just moved from [place]  "state and had been \"running low\" on some of the medications, but is unclear which ones  -resume losartan and coreg, also on hydralazine and imdur, but blood pressure improved on the floor so will monitor and resume as appropriate  -labetalol prn     Elevated creatinine  -unknown baseline   -continue IVF, repeat BMP in AM     Lactic acidosis   -continue IVF, repeat lactic     Mild thrombocytopenia   -continue to monitor CBC  -no s/s of bleeding     ? A fib   -EKG here with NSR  -xarelto on med list, will continue    ?Hypoxia   -no known history of COPD  -CXR without acute abnormality   -wean off oxygen    Hx of CVA  Hx of CAD    DVT prophylaxis:  On xarelto      CODE STATUS:    Code Status and Medical Interventions:   Ordered at: 07/22/21 2480     Code Status:    CPR     Medical Interventions (Level of Support Prior to Arrest):    Full       Admission Status:  I believe this patient meets INPATIENT status due to mild DKA in setting of acute UTI.  I feel patient’s risk for adverse outcomes and need for care warrant INPATIENT evaluation and I predict the patient’s care encounter to likely last beyond 2 midnights.      Arely Moreno MD  07/22/21      Electronically signed by Arely Moreno MD at 07/22/21 1442       {Outbreak/Travel/Exposure Documentation......;  Question Available Choices Patient Response   COVID-19 Outbreak Screen:  Do you currently have a new onset of the following symptoms?        Fever/Chills, Cough, Shortness of air, Loss of taste or smell, No, Unknown  No (07/22/21 7973)   COVID-19 Outbreak Screen: In the last 14 days, have you had contact with anyone who is ill, has show any of the symptoms listed above and/or has been diagnosis with the 2019 Novel Coronavirus? This includes any immediate household members but excludes any patients with whom you have been in contact within your normal work duties wearing proper PPE, if you are a healthcare worker.  Yes, No, Unknown              No (07/22/21 " 0522)   COVID-19 Outbreak Screen: Who was notified? Free text (not recorded)   Ebola Screening Outbreak Screen: Have you traveled to the [place] the [place] or [place] within the past 21 days?  Yes, No, Unknown (not recorded)   Ebola Screening Outbreak Screen: Do you have ANY of the following symptoms: Fever/Chills, Vomiting, Diarrhea, Fatigue, Headache, Muscle pain, Unexplained bleeding, Abdominal (stomach) pain, No, Unknown (not recorded)   Ebola Screening Outbreak Screen: Name of Person notified Free text (not recorded)   Travel Screen: Have you traveled in the last month? If so, to what country have you traveled? If [place] what state? Yes, No, Unknown  List of all countries  List of all States Unknown (07/22/21 0541)  (not recorded)  (not recorded)   Infection Risk: Do you currently have the following symptoms?  (If cough is selected, the Tuberculosis Screen is performed.) Cough, Fever, Rash, No No (07/22/21 0541)   Tuberculosis Screen: Do you have any of the following Tuberculosis Risks?  · Have you lived or spent time with anyone who had or may have TB?  · Have you lived in or visited any of the following areas for more than one month: [place], [place], [place], [place] or [place], the [place] or [place]?  · Do you have HIV/AIDS?  · Have you lived in or worked in a nursing home, homeless shelter, correctional facility, or substance abuse treatment facility?   · No    If Yes do you have any of the following symptoms? Yes responses display to the right    If Yes, symptoms listed are:  Cough greater than or equal to 3 weeks, Loss of appetite, Unexplained weight loss, Night sweats, Bloody sputum or hemoptysis, Hoarseness, Fever, Fatigue, Chest pain, No (not recorded)  (not recorded)   Exposure Screen: Have you been exposed to any of these contagious diseases in the last month? Measles, Chickenpox, Meningitis, Pertussis, Whooping Cough, No No (07/22/21 0541)          Physician Progress Notes (most  "recent note)      Yumiko Garsia MD at 21 1202              Psychiatric Medicine Services  PROGRESS NOTE    Patient Name: Angeles Hoffman  : 1940  MRN: [id number]    Date of Admission: 2021  Primary Care Physician: Provider, No Known    Subjective   Subjective     CC:  AMS/UTI/hyperglycemia    HPI:  Patient spoken to with  present. She is still somewhat confused and  notes she \"speaks simply\" and he has a hard time translating for her. Patient overall reports she feels better.     ROS:  Gen- No fevers, chills  CV- No chest pain, palpitations  Resp- No cough, dyspnea  GI- No N/V/D, abd pain         Objective   Objective     Vital Signs:   Temp:  [97.6 °F (36.4 °C)-98.9 °F (37.2 °C)] 97.6 °F (36.4 °C)  Heart Rate:  [57-69] 57  Resp:  [16] 16  BP: (118-138)/(49-66) 127/56  Flow (L/min):  [2] 2     Physical Exam:  GEN- no acute distress noted, resting in bed, awake,  on Ipad  HEENT- atraumatic, normocephalic, eomi  NECK- supple, trachea midline, no masses  RESP: ctab, normal effort  CV: no murmurs, s1/s2, rrr  MSK: no edema noted, spontaneous movement of all extremities  NEURO: alert, oriented, no focal deficits  SKIN: no rashes  PSYCH: appropriate mood and affect       Results Reviewed:  LAB RESULTS:      Lab 21  0409 21  1032 21  0540   WBC 5.26  --  7.99   HEMOGLOBIN 10.9*  --  12.4   HEMATOCRIT 35.1  --  39.9   PLATELETS 118*  --  128*   NEUTROS ABS 2.56  --  6.23   IMMATURE GRANS (ABS) 0.01  --  0.03   LYMPHS ABS 2.20  --  1.23   MONOS ABS 0.42  --  0.46   EOS ABS 0.05  --  0.02   MCV 94.4  --  94.5   PROCALCITONIN  --   --  0.08   LACTATE  --  2.8* 2.6*   PROTIME  --  13.1  --          Lab 21  0409 21  1332 21  0540   SODIUM 140 136 138   POTASSIUM 3.7 3.7 4.4   CHLORIDE 104 95* 95*   CO2 28.0 29.0 27.0   ANION GAP 8.0 12.0 16.0*   BUN 13 17 24*   CREATININE 0.90 0.87 1.20*   GLUCOSE 158* 209* 345* "   CALCIUM 8.5* 9.2 9.6   TSH  --   --  1.470         Lab 07/23/21  0409 07/22/21  0540   TOTAL PROTEIN 5.7* 7.2   ALBUMIN 3.30* 4.30   GLOBULIN 2.4 2.9   ALT (SGPT) 11 19   AST (SGOT) 14 20   BILIRUBIN 0.2 0.3   ALK PHOS 62 86   LIPASE  --  43         Lab 07/22/21  1032 07/22/21  0540   PROBNP  --  800.5   TROPONIN T  --  <0.010   PROTIME 13.1  --    INR 1.02  --                  Lab 07/22/21  0538   PH, ARTERIAL 7.405   PCO2, ARTERIAL 48.4*   PO2 ART 69.6*   FIO2 32   HCO3 ART 30.3*   BASE EXCESS ART 4.7*     Brief Urine Lab Results  (Last result in the past 365 days)      Color   Clarity   Blood   Leuk Est   Nitrite   Protein   CREAT   Urine HCG        07/22/21 0540 Yellow Clear Negative Negative Positive 30 mg/dL (1+)               Microbiology Results Abnormal     Procedure Component Value - Date/Time    Blood Culture - Blood, Arm, Right [[id number]] Collected: 07/22/21 0610    Lab Status: Preliminary result Specimen: Blood from Arm, Right Updated: 07/23/21 0645     Blood Culture No growth at 24 hours    Blood Culture - Blood, Arm, Right [[id number]] Collected: 07/22/21 0600    Lab Status: Preliminary result Specimen: Blood from Arm, Right Updated: 07/23/21 0645     Blood Culture No growth at 24 hours    COVID PRE-OP / PRE-PROCEDURE SCREENING ORDER (NO ISOLATION) - Swab, Nasopharynx [[id number]]  (Normal) Collected: 07/22/21 0540    Lab Status: Final result Specimen: Swab from Nasopharynx Updated: 07/22/21 0616    Narrative:      The following orders were created for panel order COVID PRE-OP / PRE-PROCEDURE SCREENING ORDER (NO ISOLATION) - Swab, Nasopharynx.  Procedure                               Abnormality         Status                     ---------                               -----------         ------                     COVID-19 and FLU A/B PCR...[[id number]]  Normal              Final result                 Please view results for these tests on the individual orders.    COVID-19 and FLU A/B PCR - Swab,  Nasopharynx [[id number]]  (Normal) Collected: 07/22/21 0540    Lab Status: Final result Specimen: Swab from Nasopharynx Updated: 07/22/21 0616     COVID19 Not Detected     Influenza A PCR Not Detected     Influenza B PCR Not Detected    Narrative:      Fact sheet for providers: https://www.fda.gov/media/757890/download    Fact sheet for patients: https://www.fda.gov/media/356351/download    Test performed by PCR.          CT Abdomen Pelvis Without Contrast    Result Date: 7/22/2021  CT Abdomen Pelvis WO INDICATION: Weakness and confusion. Infection. TECHNIQUE: CT of the abdomen and pelvis without IV contrast. Coronal and sagittal reconstructions were obtained.  Radiation dose reduction techniques included automated exposure control or exposure modulation based on body size. Count of known CT and cardiac nuc med studies performed in previous 12 months: 1. COMPARISON: None available. FINDINGS: Abdomen: The liver, spleen and pancreas are normal in size. No evidence of hydronephrosis or kidney stone disease. No adrenal masses. No evidence of retroperitoneal adenopathy. No distended bowel loops are seen. There is a small umbilical hernia that contains fat only. Pelvis: The appendix is not identified but no inflammatory changes are seen in the right lower quadrant. Multiple calcified uterine fibroids are noted. No evidence of pelvic abscess or adenopathy. There is a chronic-appearing partial compression fracture of L1.     Impression: No acute or inflammatory changes are seen in the abdomen or pelvis. Signer Name: Tam Carmen MD  Signed: 7/22/2021 6:13 AM  Workstation Name: [id number]  [facility]    CT Head Without Contrast    Result Date: 7/22/2021  CT Head WO HISTORY: Weakness and confusion TECHNIQUE: Axial unenhanced head CT. Radiation dose reduction techniques included automated exposure control or exposure modulation based on body size. Count of known CT and cardiac nuc med studies  performed in previous 12 months: 0. Time of scan: 5:54 AM COMPARISON: None. FINDINGS: No intracranial hemorrhage, mass, or infarct. No hydrocephalus or extra-axial fluid collection. There are senescent changes, including volume loss and nonspecific white matter change, but no acute abnormality is seen. The skull base, calvarium, and extracranial soft tissues are normal.     Impression: Senescent changes without acute abnormality. Signer Name: Tam Carmen MD  Signed: 7/22/2021 6:07 AM  Workstation Name: [facility]  [facility]    XR Chest 1 View    Result Date: 7/22/2021  CR Chest 1 Vw INDICATION: Sepsis with hypoxia COMPARISON:  None available. FINDINGS: Single portable AP view(s) of the chest.  Cardiomegaly is seen. Postoperative changes of median sternotomy are noted. The lungs are clear with normal vascular markings. No effusions are seen.     Impression: Cardiomegaly. No acute findings in the lungs. Signer Name: Tam Carmen MD  Signed: 7/22/2021 7:21 AM  Workstation Name: [facility]  [facility]          I have reviewed the medications:  Scheduled Meds:carvedilol, 6.25 mg, Oral, BID With Meals  cefTRIAXone, 1 g, Intravenous, Q24H  insulin lispro, 0-7 Units, Subcutaneous, TID AC  losartan, 25 mg, Oral, Q24H  nystatin, , Topical, Q12H  rivaroxaban, 20 mg, Oral, Daily With Dinner  senna-docusate sodium, 2 tablet, Oral, BID  sodium chloride, 10 mL, Intravenous, Q12H      Continuous Infusions:sodium chloride, 100 mL/hr, Last Rate: 100 mL/hr (07/23/21 0652)      PRN Meds:.•  acetaminophen **OR** acetaminophen **OR** acetaminophen  •  senna-docusate sodium **AND** polyethylene glycol **AND** bisacodyl **AND** bisacodyl  •  dextrose  •  dextrose  •  glucagon (human recombinant)  •  labetalol  •  sodium chloride    Assessment/Plan   Assessment & Plan     Active Hospital Problems    Diagnosis  POA   • Altered mental status [R41.82]  Yes   • Type 2 diabetes mellitus  "with hyperglycemia (CMS/[facility]) [E11.65]  Yes   • Elevated serum creatinine [R79.89]  Yes   • Hypertensive urgency [I16.0]  Yes      Resolved Hospital Problems   No resolved problems to display.        Brief Hospital Course to date:  Angeles Hoffman is a 80 y.o. female Cymraes speaking, with PMH of T2DM, hx of CVA 5 years, CAD s/p MI, and HTN who presented to the ED with AMS. Found to have abnormal UA and hyperglycemia with mild anion gap increase and glucosuria.     This patient's problems and plans were partially entered by my partner and updated as appropriate by me 07/23/21.         Lethargy, confusion   Metabolic encephalopathy -- seem improved  -CTH w/o contrast without acute abnormality , CT a/p without acute intra abdominal abnormality, CXR without acute findings   -likely 2/2 UTI and mild DKA  -will treat with antibiotics, IVF, and insulin and monitor for improvement -- seems to be back to baseline per patient, will try to d/w family      Mild DKA-- resolved  -elevated blood sugar up to 500 per  report  -anion gap mildly elevated on presentation  -apparently on glipizide and metformin, but has not been able to refill as they just moved from [facility] and had a difficult time transferring medications here , have d/w pharmacy to review and look into medications  --blood sugars much improved on minimal SSI- will check A1C     UTI- GNB  -s/p zosyn in ED, start ceftriaxone, follow up urine cultures   -cx prelim with >100k 2 types of GNB     Hypertensive urgency --better  -nurse got med list from , again not sure if she had been taking medications as she just moved from [facility] and had been \"running low\" on some of the medications, but is unclear which ones  -resume losartan and coreg, also on hydralazine and imdur, but blood pressure improved on the floor so will monitor and resume as appropriate  -labetalol prn   - on losartan alone and btter- monitor      Elevated creatinine-- " normalized  -unknown baseline   -continue IVF, repeat BMP in AM      Lactic acidosis   -continue IVF, repeat lactic - still elevated but clinically looks good      Mild thrombocytopenia   -continue to monitor CBC  -no s/s of bleeding      ? A fib   -EKG here with NSR  -xarelto on med list, will continue        Hx of CVA  Hx of CAD       Attempted to call Sara at number listed (daughter) and  via number listed at [phone]- VM obtained on both numbers.     Addendum- d/w pharmacy, [facility], who has obtained med list with several anti-diabetic agents recently filled  by local PCP in [place]. Patient also filled prednisone 20mg qday for 30 day supply- not sure why she is on this, but possible contributing to hyperglycemia???       DVT prophylaxis:  Medical and mechanical DVT prophylaxis orders are present.       Disposition: I expect the patient to be discharged pending further improvement/urine cx. Plan is home with     CODE STATUS:   Code Status and Medical Interventions:   Ordered at: 21 0851     Code Status:    CPR     Medical Interventions (Level of Support Prior to Arrest):    Full       Yumiko Garsia MD  21                Electronically signed by Yumiko Garsia MD at 21 1448          Physical Therapy Notes (most recent note)      Shelbie Lewis, PT at 21 0907  Version 1 of 1         Patient Name: Angeles Hoffman  : 1940    MRN: [id number]                              Today's Date: 2021       Admit Date: 2021    Visit Dx:     ICD-10-CM ICD-9-CM   1. Acute sepsis (CMS/[facility])  A41.9 038.9     995.91   2. Pyelonephritis  N12 590.80   3. Hypoxia  R09.02 799.02   4. Altered mental status, unspecified altered mental status type  R41.82 780.97   5. Hyperglycemia  R73.9 790.29   6. Oral phase dysphagia  R13.11 787.21   7. Impaired functional mobility, balance, gait, and endurance  Z74.09 V49.89     Patient Active Problem List   Diagnosis   • Altered mental status    • Type 2 diabetes mellitus with hyperglycemia (CMS/[facility])   • Elevated serum creatinine   • Hypertensive urgency     Past Medical History:   Diagnosis Date   • Diabetes mellitus (CMS/[facility])    • Elevated cholesterol    • Hypertension      No past surgical history on file.  General Information     Row Name 07/23/21 0907          Physical Therapy Time and Intention    Document Type  evaluation  -     Mode of Treatment  physical therapy  -     Row Name 07/23/21 0907          General Information    Patient Profile Reviewed  yes  -     Prior Level of Function  mod assist:;transfer;bed mobility pt relates to  that she would use RW to get OOB to bathroom. Unsure of how much assistance she required to do this.  -     Existing Precautions/Restrictions  fall  -     Barriers to Rehab  medically complex;previous functional deficit;cognitive status;language barrier  -     Row Name 07/23/21 0907          Living Environment    Lives With  spouse  -     Row Name 07/23/21 0907          Home Main Entrance    Number of Stairs, Main Entrance  none  -     Row Name 07/23/21 0907          Stairs Within Home, Primary    Number of Stairs, Within Home, Primary  none  -     Row Name 07/23/21 0907          Cognition    Orientation Status (Cognition)  oriented to;person;place;disoriented to;situation;time  -     Row Name 07/23/21 0907          Safety Issues, Functional Mobility    Safety Issues Affecting Function (Mobility)  awareness of need for assistance;insight into deficits/self-awareness;safety precaution awareness;friction/shear risk  -     Impairments Affecting Function (Mobility)  balance;cognition;strength  -     Cognitive Impairments, Mobility Safety/Performance  insight into deficits/self-awareness;safety precaution awareness;awareness, need for assistance  -     Comment, Safety Issues/Impairments (Mobility)  needs Rwandan   -       User Key  (r) = Recorded By, (t) = Taken By, (c) =  Cosigned By    Initials Name Provider Type    Shelbie De Oliveira PT Physical Therapist        Mobility     Row Name 07/23/21 0950          Bed Mobility    Bed Mobility  supine-sit;sit-supine  -SJ     Supine-Sit [place] (Bed Mobility)  minimum assist (75% patient effort);2 person assist;verbal cues  -SJ     Sit-Supine [place] (Bed Mobility)  minimum assist (75% patient effort);2 person assist;verbal cues  -SJ     Assistive Device (Bed Mobility)  bed rails;draw sheet;head of bed elevated  -SJ     Comment (Bed Mobility)  extra time and effort needed to complete.  -     Row Name 07/23/21 0950          Transfers    Comment (Transfers)  STS x2 reps with RW with Ludwig x2. Extra time and effort required for all mobility due to need for Maldivian <> English interpretation.  -     Row Name 07/23/21 0950          Sit-Stand Transfer    Sit-Stand [place] (Transfers)  minimum assist (75% patient effort);2 person assist;verbal cues;nonverbal cues (demo/gesture)  -     Assistive Device (Sit-Stand Transfers)  walker, front-wheeled  -SJ     Row Name 07/23/21 0950          Gait/Stairs (Locomotion)    [place] Level (Gait)  minimum assist (75% patient effort);2 person assist  -SJ     Assistive Device (Gait)  walker, front-wheeled  -SJ     Distance in Feet (Gait)  2ft  -SJ     Deviations/Abnormal Patterns (Gait)  weight shifting decreased;right sided deviations  -SJ     Right Sided Gait Deviations  foot drop/toe drag;weight shift ability decreased  -SJ     Comment (Gait/Stairs)  Pt sidestepped to HOB with cues and assist for weight shifting  -SJ       User Key  (r) = Recorded By, (t) = Taken By, (c) = Cosigned By    Initials Name Provider Type    Shelbie De Oliveira PT Physical Therapist        Obj/Interventions     Row Name 07/23/21 0903          Range of Motion Comprehensive    General Range of Motion  bilateral lower extremity ROM WFL  -     Row Name 07/23/21 0953          Strength Comprehensive (MMT)     General Manual Muscle Testing (MMT) Assessment  lower extremity strength deficits identified  -SJ     Comment, General Manual Muscle Testing (MMT) Assessment  RLE 4-/5, LLE 4/5  -SJ     Row Name 07/23/21 0953          Balance    Balance Assessment  sitting static balance;standing static balance  -SJ     Static Sitting Balance  WFL;unsupported;supported;sitting, edge of bed  -SJ     Static Standing Balance  mild impairment;supported  -SJ     Balance Interventions  sit to stand;weight shifting activity  -SJ       User Key  (r) = Recorded By, (t) = Taken By, (c) = Cosigned By    Initials Name Provider Type    Shelbie De Oliveira, PT Physical Therapist        Goals/Plan     Row Name 07/23/21 0957          Bed Mobility Goal 1 (PT)    Activity/Assistive Device (Bed Mobility Goal 1, PT)  sit to supine;supine to sit  -SJ     [place] Level/Cues Needed (Bed Mobility Goal 1, PT)  modified independence  -SJ     Time Frame (Bed Mobility Goal 1, PT)  long term goal (LTG);2 weeks  -SJ     Row Name 07/23/21 0957          Transfer Goal 1 (PT)    Activity/Assistive Device (Transfer Goal 1, PT)  sit-to-stand/stand-to-sit;bed-to-chair/chair-to-bed  -SJ     [place] Level/Cues Needed (Transfer Goal 1, PT)  contact guard assist;1 person assist  -SJ     Time Frame (Transfer Goal 1, PT)  long term goal (LTG);2 weeks  -SJ     Row Name 07/23/21 0957          Gait Training Goal 1 (PT)    Activity/Assistive Device (Gait Training Goal 1, PT)  gait (walking locomotion);walker, rolling;decrease fall risk  -SJ     [place] Level (Gait Training Goal 1, PT)  minimum assist (75% or more patient effort);2 person assist  -SJ     Distance (Gait Training Goal 1, PT)  10ft  -SJ     Time Frame (Gait Training Goal 1, PT)  long term goal (LTG);2 weeks  -SJ       User Key  (r) = Recorded By, (t) = Taken By, (c) = Cosigned By    Initials Name Provider Type    Shelbie De Oliveira, PT Physical Therapist        Clinical Impression     Row Name  07/23/21 0954          Pain    Additional Documentation  Pain Scale: Numbers Pre/Post-Treatment (Group)  -     Row Name 07/23/21 0954          Pain Scale: Numbers Pre/Post-Treatment    Pretreatment Pain Rating  0/10 - no pain  -     Posttreatment Pain Rating  0/10 - no pain  -     Row Name 07/23/21 0954          Plan of Care Review    Plan of Care Reviewed With  patient  -SJ     Outcome Summary  PT eval completed. Pt presents with weakness, decreased functional mobility independence. Extra time and effort required for mobility due to need for Bhutanese <> English . Pt req assist x2 for bed mobility, STS x2 reps with assist x2, and able to sidestep to HOB with RW. PT rec d/c home with HHPT vs rehab pending on family's ability to assist. No family present at time of eval.  -     Row Name 07/23/21 0954          Therapy Assessment/Plan (PT)    Patient/Family Therapy Goals Statement (PT)  feel better  -     Rehab Potential (PT)  good, to achieve stated therapy goals  -     Criteria for Skilled Interventions Met (PT)  yes;skilled treatment is necessary  -     Row Name 07/23/21 0954          Positioning and Restraints    Pre-Treatment Position  in bed  -SJ     Post Treatment Position  bed  -SJ     In Bed  fowlers;call light within reach;encouraged to call for assist;exit alarm on;with other staff;with nsg;heels elevated  -       User Key  (r) = Recorded By, (t) = Taken By, (c) = Cosigned By    Initials Name Provider Type    Shelbie De Oliveira, PT Physical Therapist        Outcome Measures     Row Name 07/23/21 0958 07/23/21 0815       How much help from another person do you currently need...    Turning from your back to your side while in flat bed without using bedrails?  3  -SJ  3  -PS    Moving from lying on back to sitting on the side of a flat bed without bedrails?  2  -SJ  2  -PS    Moving to and from a bed to a chair (including a wheelchair)?  2  -SJ  2  -PS    Standing up from a chair  using your arms (e.g., wheelchair, bedside chair)?  2  -  2  -PS    Climbing 3-5 steps with a railing?  1  -SJ  2  -PS    To walk in hospital room?  2  -SJ  2  -PS    AM-PAC 6 Clicks Score (PT)  12  -SJ  13  -PS    Row Name 07/23/21 0958          Functional Assessment    Outcome Measure Options  AM-PAC 6 Clicks Basic Mobility (PT)  -       User Key  (r) = Recorded By, (t) = Taken By, (c) = Cosigned By    Initials Name Provider Type    Shelbie De Oliveira, SHAY Physical Therapist    Rachelle Wing, RN Registered Nurse                       Physical Therapy Education                 Title: PT OT SLP Therapies (Done)     Topic: Physical Therapy (Done)     Point: Mobility training (Done)     Learning Progress Summary           Patient Acceptance, E,I, VU,NR by  at 7/23/2021 0958                   Point: Home exercise program (Done)     Learning Progress Summary           Patient Acceptance, E,I, VU,NR by  at 7/23/2021 0958                   Point: Body mechanics (Done)     Learning Progress Summary           Patient Acceptance, E,I, VU,NR by  at 7/23/2021 0958                   Point: Precautions (Done)     Learning Progress Summary           Patient Acceptance, E,I, VU,NR by  at 7/23/2021 0958                               User Key     Initials Effective Dates Name Provider Type [facility] 06/16/21 -  Shelbie Lewis, PT Physical Therapist PT              PT Recommendation and Plan  Planned Therapy Interventions (PT): balance training, bed mobility training, gait training, home exercise program, strengthening, patient/family education, transfer training  Plan of Care Reviewed With: patient  Outcome Summary: PT eval completed. Pt presents with weakness, decreased functional mobility independence. Extra time and effort required for mobility due to need for Belgian <> English . Pt req assist x2 for bed mobility, STS x2 reps with assist x2, and able to sidestep to HOB with RW. PT rec d/c  home with HHPT vs rehab pending on family's ability to assist. No family present at time of eval.     Time Calculation:   PT Charges     Row Name 21 0959             Time Calculation    Start Time  907  -      PT Non-Billable Time (min)  46 min  -      PT Received On  21  -      PT Goal Re-Cert Due Date  21  -        User Key  (r) = Recorded By, (t) = Taken By, (c) = Cosigned By    Initials Name Provider Type     Shelbie Lewis PT Physical Therapist        Therapy Charges for Today     Code Description Service Date Service Provider Modifiers Qty    [id number] HC PT EVAL MOD COMPLEXITY 4 2021 Shelbie Lewis PT GP 1          PT G-Codes  Outcome Measure Options: AM-PAC 6 Clicks Basic Mobility (PT)  AM-PAC 6 Clicks Score (PT): 12    Shelbie Lewis PT  2021      Electronically signed by Shelbie Lewis PT at 21 1000          Occupational Therapy Notes (most recent note)      Aubrie Cerna, OT at 21 0920          Patient Name: Angeles Hoffman  : 1940    MRN: [id number]                              Today's Date: 2021       Admit Date: 2021    Visit Dx:     ICD-10-CM ICD-9-CM   1. Acute sepsis (CMS/HCC)  A41.9 038.9     995.91   2. Pyelonephritis  N12 590.80   3. Hypoxia  R09.02 799.02   4. Altered mental status, unspecified altered mental status type  R41.82 780.97   5. Hyperglycemia  R73.9 790.29   6. Oral phase dysphagia  R13.11 787.21   7. Impaired functional mobility, balance, gait, and endurance  Z74.09 V49.89     Patient Active Problem List   Diagnosis   • Altered mental status   • Type 2 diabetes mellitus with hyperglycemia (CMS/HCC)   • Elevated serum creatinine   • Hypertensive urgency     Past Medical History:   Diagnosis Date   • Diabetes mellitus (CMS/HCC)    • Elevated cholesterol    • Hypertension      No past surgical history on file.  General Information     Row Name 21 0952          OT Time and Intention    Document Type   evaluation  -LITO     Mode of Treatment  occupational therapy  -LITO     Row Name 07/23/21 0952          General Information    Patient Profile Reviewed  yes  -LITO     Prior Level of Function  min assist:;ADL's;transfer;all household mobility;independent:;bed mobility;dependent:;home management;using stairs  -LITO     Existing Precautions/Restrictions  fall;other (see comments) R sided weakness  -LITO     Barriers to Rehab  medically complex;previous functional deficit;cognitive status;language barrier [place]  utilized to translate Cay[place] Islander<->English  -LITO     Row Name 07/23/21 0952          Occupational Profile    Environmental Supports and Barriers (Occupational Profile)  Pt has RW, w/c, shower chair, and BSC at home.  -LITO     Row Name 07/23/21 0952          Living Environment    Lives With  alone  -LITO     Row Name 07/23/21 0952          Home Main Entrance    Number of Stairs, Main Entrance  none  -LITO     Row Name 07/23/21 0952          Stairs Within Home, Primary    Number of Stairs, Within Home, Primary  none  -LITO     Row Name 07/23/21 0952          Cognition    Orientation Status (Cognition)  oriented to;person;place;disoriented to;time Pt aware she is in the hospital but not which one  -LITO     Row Name 07/23/21 0952          Safety Issues, Functional Mobility    Safety Issues Affecting Function (Mobility)  awareness of need for assistance;friction/shear risk;insight into deficits/self-awareness;judgment;problem-solving;safety precaution awareness;safety precautions follow-through/compliance;sequencing abilities  -LITO     Impairments Affecting Function (Mobility)  balance;cognition;coordination;endurance/activity tolerance;strength  -LITO     Cognitive Impairments, Mobility Safety/Performance  awareness, need for assistance;insight into deficits/self-awareness;judgment;problem-solving/reasoning;safety precaution awareness;safety precaution follow-through  -LITO       User Key  (r) = Recorded By, (t) = Taken By, (c)  = Cosigned By    Initials Name Provider Type    LITO Aubrie Cerna OT Occupational Therapist          Mobility/ADL's     Row Name 07/23/21 0958          Bed Mobility    Bed Mobility  supine-sit;sit-supine  -LITO     Supine-Sit [place] (Bed Mobility)  verbal cues;minimum assist (75% patient effort);2 person assist  -LITO     Sit-Supine [place] (Bed Mobility)  verbal cues;minimum assist (75% patient effort);2 person assist  -LITO     Assistive Device (Bed Mobility)  bed rails;draw sheet;head of bed elevated  -LITO     Comment (Bed Mobility)  Pt completed with extra time and effort.  -LITO     Row Name 07/23/21 0958          Transfers    Transfers  sit-stand transfer  -LITO     Comment (Transfers)  STSx2 from EOB to RW with MinAx2, extra time needed for sidestepping to her R toward HOB  -LITO     Sit-Stand [place] (Transfers)  verbal cues;nonverbal cues (demo/gesture);minimum assist (75% patient effort);2 person assist  -LITO     Row Name 07/23/21 0958          Sit-Stand Transfer    Assistive Device (Sit-Stand Transfers)  walker, front-wheeled  -LITO     Row Name 07/23/21 0958          Functional Mobility    Functional Mobility- Comment  Deferred  -LITO     Row Name 07/23/21 0958          Activities of Daily Living    BADL Assessment/Intervention  lower body dressing;grooming;feeding  -     Row Name 07/23/21 0958          Lower Body Dressing Assessment/Training    [place] Level (Lower Body Dressing)  don;socks;dependent (less than 25% patient effort)  -LITO     Position (Lower Body Dressing)  sitting up in bed  -LITO     Comment (Lower Body Dressing)  Pt reports requiring occasional assist for donning socks and shoes at home.  -LITO     Row Name 07/23/21 0958          Grooming Assessment/Training    [place] Level (Grooming)  wash face, hands;set up  -LITO     Position (Grooming)  edge of bed sitting  -LITO     Comment (Grooming)  Declined oral care  -LITO     Row Name 07/23/21 0958          Self-Feeding Assessment/Training     [place] Level (Feeding)  liquids to mouth;prepare tray/open items;scoop food and bring to mouth;set up  -LITO     Position (Self-Feeding)  sitting up in bed  -LITO     Comment (Feeding)  Assist to open milk cartons, pt able to incorporate R hand in 2-handed tasks; reports she is L hand dominant since CVA.  -LITO       User Key  (r) = Recorded By, (t) = Taken By, (c) = Cosigned By    Initials Name Provider Type    Aubrie Mack OT Occupational Therapist        Obj/Interventions     Row Name 07/23/21 1004          Sensory Assessment (Somatosensory)    Sensory Assessment (Somatosensory)  UE sensation intact  -     Row Name 07/23/21 1004          Vision Assessment/Intervention    Visual Impairment/Limitations  WFL;other (see comments) For purposes of eval  -LITO     Row Name 07/23/21 1004          Range of Motion Comprehensive    General Range of Motion  bilateral upper extremity ROM WFL  -     Row Name 07/23/21 1004          Strength Comprehensive (MMT)    General Manual Muscle Testing (MMT) Assessment  upper extremity strength deficits identified  -LITO     Comment, General Manual Muscle Testing (MMT) Assessment  RUE grossly 4-/5; RUE grossly 4/5  -LITO     Row Name 07/23/21 1004          Balance    Balance Assessment  sitting static balance;sitting dynamic balance;standing static balance;standing dynamic balance  -LITO     Static Sitting Balance  WFL;unsupported;sitting, edge of bed  -LITO     Dynamic Sitting Balance  mild impairment;unsupported;sitting, edge of bed  -LITO     Static Standing Balance  mild impairment;supported;standing  -LITO     Dynamic Standing Balance  moderate impairment;supported;standing  -LITO     Balance Interventions  standing;weight shifting activity  -LITO     Comment, Balance  MinAx2 to take sidesteps to the R toward HOB.  -LITO       User Key  (r) = Recorded By, (t) = Taken By, (c) = Cosigned By    Initials Name Provider Type    Aubrie Mack OT Occupational Therapist        Goals/Plan     Row  Name 07/23/21 1147          Bed Mobility Goal 1 (OT)    Activity/Assistive Device (Bed Mobility Goal 1, OT)  sit to supine/supine to sit  -LITO     [place] Level/Cues Needed (Bed Mobility Goal 1, OT)  contact guard assist  -LITO     Time Frame (Bed Mobility Goal 1, OT)  long term goal (LTG);by discharge  -LITO     Progress/Outcomes (Bed Mobility Goal 1, OT)  goal ongoing  -LITO     Row Name 07/23/21 1147          Transfer Goal 1 (OT)    Activity/Assistive Device (Transfer Goal 1, OT)  sit-to-stand/stand-to-sit;toilet;commode, bedside without drop arms  -LITO     [place] Level/Cues Needed (Transfer Goal 1, OT)  minimum assist (75% or more patient effort)  -LITO     Time Frame (Transfer Goal 1, OT)  long term goal (LTG);by discharge  -LITO     Progress/Outcome (Transfer Goal 1, OT)  goal ongoing  -LITO     Row Name 07/23/21 1147          Toileting Goal 1 (OT)    Activity/Device (Toileting Goal 1, OT)  adjust/manage clothing;perform perineal hygiene;commode, bedside without drop arms  -LITO     [place] Level/Cues Needed (Toileting Goal 1, OT)  minimum assist (75% or more patient effort)  -LITO     Time Frame (Toileting Goal 1, OT)  long term goal (LTG);by discharge  -LITO     Progress/Outcome (Toileting Goal 1, OT)  goal ongoing  -LITO     Row Name 07/23/21 4197          Therapy Assessment/Plan (OT)    Planned Therapy Interventions (OT)  activity tolerance training;BADL retraining;functional balance retraining;occupation/activity based interventions;patient/caregiver education/training;prosthetic fitting/training;ROM/therapeutic exercise;strengthening exercise;transfer/mobility retraining  -LITO       User Key  (r) = Recorded By, (t) = Taken By, (c) = Cosigned By    Initials Name Provider Type    Aubrie Mack OT Occupational Therapist        Clinical Impression     Row Name 07/23/21 1006          Pain Scale: Numbers Pre/Post-Treatment    Pretreatment Pain Rating  0/10 - no pain  -LITO     Posttreatment Pain Rating  0/10 - no  pain  -LITO     Row Name 07/23/21 1006          Plan of Care Review    Plan of Care Reviewed With  patient  -LITO     Outcome Summary  OT eval completed. Pt presents with generalized weakness and decreased activity tolerance limiting ADL's. Pt able to self-feed breakfast sitting up in bed with KESSLER, performed supine to sit with MinAx2, washed hands and face seated at EOB with KESSLER/SUP. Pt reports needing Sree for ADL's at baseline. OT to follow to progress self-care and safety. Recommend home with assist and HHOT at discharge.  -LITO     Row Name 07/23/21 1006          Therapy Assessment/Plan (OT)    Rehab Potential (OT)  good, to achieve stated therapy goals  -LITO     Criteria for Skilled Therapeutic Interventions Met (OT)  yes;meets criteria;skilled treatment is necessary  -LITO     Therapy Frequency (OT)  daily  -LITO     Row Name 07/23/21 1006          Therapy Plan Review/Discharge Plan (OT)    Anticipated Discharge Disposition (OT)  home with assist;home with home health  -LITO     Row Name 07/23/21 1006          Vital Signs    Pre Systolic BP Rehab  127  -LITO     Pre Treatment Diastolic BP  56  -LITO     Pretreatment Heart Rate (beats/min)  69  -LITO     Pre SpO2 (%)  94  -LITO     Pre Patient Position  Supine  -LITO     Intra Patient Position  Standing  -LITO     Post Patient Position  Supine  -LITO     Row Name 07/23/21 1006          Positioning and Restraints    Pre-Treatment Position  in bed  -LITO     Post Treatment Position  bed  -LITO     In Bed  fowlers;call light within reach;encouraged to call for assist;exit alarm on;with nsg;with other staff  -LITO       User Key  (r) = Recorded By, (t) = Taken By, (c) = Cosigned By    Initials Name Provider Type    Aubrie Mack, OT Occupational Therapist        Outcome Measures     Row Name 07/23/21 4170          How much help from another is currently needed...    Putting on and taking off regular lower body clothing?  1  -LITO     Bathing (including washing, rinsing, and drying)  2  -LITO      Toileting (which includes using toilet bed pan or urinal)  1  -LITO     Putting on and taking off regular upper body clothing  2  -LITO     Taking care of personal grooming (such as brushing teeth)  3  -LITO     Eating meals  3  -LITO     AM-PAC 6 Clicks Score (OT)  12  -LITO     Row Name 07/23/21 0958 07/23/21 0815       How much help from another person do you currently need...    Turning from your back to your side while in flat bed without using bedrails?  3  -SJ  3  -PS    Moving from lying on back to sitting on the side of a flat bed without bedrails?  2  -SJ  2  -PS    Moving to and from a bed to a chair (including a wheelchair)?  2  -SJ  2  -PS    Standing up from a chair using your arms (e.g., wheelchair, bedside chair)?  2  -SJ  2  -PS    Climbing 3-5 steps with a railing?  1  -SJ  2  -PS    To walk in hospital room?  2  -SJ  2  -PS    AM-PAC 6 Clicks Score (PT)  12  -SJ  13  -PS    Row Name 07/23/21 1149 07/23/21 0958       Functional Assessment    Outcome Measure Options  AM-PAC 6 Clicks Daily Activity (OT)  -LITO  AM-PAC 6 Clicks Basic Mobility (PT)  -      User Key  (r) = Recorded By, (t) = Taken By, (c) = Cosigned By    Initials Name Provider Type    Shelbie De Oliveira, PT Physical Therapist    Rachelle Wing, RN Registered Nurse    Aubrie Mack, OT Occupational Therapist          Occupational Therapy Education                 Title: PT OT SLP Therapies (In Progress)     Topic: Occupational Therapy (In Progress)     Point: ADL training (Done)     Description:   Instruct learner(s) on proper safety adaptation and remediation techniques during self care or transfers.   Instruct in proper use of assistive devices.              Learning Progress Summary           Patient Acceptance, E, VU by LITO at 7/23/2021 1149    Comment: Role of OT; transfer training with  present for translation                   Point: Home exercise program (Not Started)     Description:   Instruct learner(s) on  appropriate technique for monitoring, assisting and/or progressing therapeutic exercises/activities.              Learner Progress:  Not documented in this visit.          Point: Precautions (Done)     Description:   Instruct learner(s) on prescribed precautions during self-care and functional transfers.              Learning Progress Summary           Patient Acceptance, E, VU by LITO at 7/23/2021 1149    Comment: Role of OT; transfer training with  present for translation                   Point: Body mechanics (Done)     Description:   Instruct learner(s) on proper positioning and spine alignment during self-care, functional mobility activities and/or exercises.              Learning Progress Summary           Patient Acceptance, E, VU by LITO at 7/23/2021 1149    Comment: Role of OT; transfer training with  present for translation                               User Key     Initials Effective Dates Name Provider Type Discipline    LITO 06/16/21 -  Aubrie Cerna OT Occupational Therapist OT              OT Recommendation and Plan  Planned Therapy Interventions (OT): activity tolerance training, BADL retraining, functional balance retraining, occupation/activity based interventions, patient/caregiver education/training, prosthetic fitting/training, ROM/therapeutic exercise, strengthening exercise, transfer/mobility retraining  Therapy Frequency (OT): daily  Plan of Care Review  Plan of Care Reviewed With: patient  Outcome Summary: OT eval completed. Pt presents with generalized weakness and decreased activity tolerance limiting ADL's. Pt able to self-feed breakfast sitting up in bed with KESSLER, performed supine to sit with MinAx2, washed hands and face seated at EOB with KESSLER/SUP. Pt reports needing Sree for ADL's at baseline. OT to follow to progress self-care and safety. Recommend home with assist and HHOT at discharge.     Time Calculation:   Time Calculation- OT     Row Name 07/23/21 0920              Time Calculation- OT    OT Start Time  0920  -LITO      OT Received On  07/23/21  -LITO      OT Goal Re-Cert Due Date  08/02/21  -LITO         Untimed Charges    OT Eval/Re-eval Minutes  55  -LITO         Total Minutes    Untimed Charges Total Minutes  55  -LITO       Total Minutes  55  -LITO        User Key  (r) = Recorded By, (t) = Taken By, (c) = Cosigned By    Initials Name Provider Type    Aubrie Mack OT Occupational Therapist        Therapy Charges for Today     Code Description Service Date Service Provider Modifiers Qty    86783445108 HC OT EVAL MOD COMPLEXITY 4 7/23/2021 Aubrie Cerna OT GO 1               Aubrie Cerna OT  7/23/2021    Electronically signed by Aubrie Cerna OT at 07/23/21 1152

## 2021-07-23 NOTE — CASE MANAGEMENT/SOCIAL WORK
Continued Stay Note  [facility]     Patient Name: Angeles Hoffman  MRN: [id number]  Today's Date: 7/23/2021    Admit Date: 7/22/2021    Discharge Plan     Row Name 07/23/21 1150       Plan    Plan  Home with HH with transportation provided.    Plan Comments  SW met with patient at bedside to discuss discharge planning. Discussed HH option. Patient was agreeable to HH. SW still attempting to contact patient’s daughter named Sara [phone], no answer, left a message. SW faxed HH referral to [facility] [phone] and [facility] [phone]; Awaiting response if can accept patient.Plan is home with HH with transportation provided.        Discharge Codes    No documentation.       Expected Discharge Date and Time     Expected Discharge Date Expected Discharge Time    Jul 26, 2021             CONSTANCE Hernandez (Kay)

## 2021-07-23 NOTE — THERAPY EVALUATION
Patient Name: Angeles Hoffman  : 1940    MRN: [id number]                              Today's Date: 2021       Admit Date: 2021    Visit Dx:     ICD-10-CM ICD-9-CM   1. Acute sepsis (CMS/HCC)  A41.9 038.9     995.91   2. Pyelonephritis  N12 590.80   3. Hypoxia  R09.02 799.02   4. Altered mental status, unspecified altered mental status type  R41.82 780.97   5. Hyperglycemia  R73.9 790.29   6. Oral phase dysphagia  R13.11 787.21   7. Impaired functional mobility, balance, gait, and endurance  Z74.09 V49.89     Patient Active Problem List   Diagnosis   • Altered mental status   • Type 2 diabetes mellitus with hyperglycemia (CMS/HCC)   • Elevated serum creatinine   • Hypertensive urgency     Past Medical History:   Diagnosis Date   • Diabetes mellitus (CMS/[facility])    • Elevated cholesterol    • Hypertension      No past surgical history on file.  General Information     Row Name 21 0952          OT Time and Intention    Document Type  evaluation  -LITO     Mode of Treatment  occupational therapy  -LITO     Row Name 21 0952          General Information    Patient Profile Reviewed  yes  -LITO     Prior Level of Function  min assist:;ADL's;transfer;all household mobility;independent:;bed mobility;dependent:;home management;using stairs  -LITO     Existing Precautions/Restrictions  fall;other (see comments) R sided weakness  -LITO     Barriers to Rehab  medically complex;previous functional deficit;cognitive status;language barrier [place]  utilized to translate [place]<->English  -LITO     Row Name 21 0952          Occupational Profile    Environmental Supports and Barriers (Occupational Profile)  Pt has RW, w/c, shower chair, and BSC at home.  -LITO     Row Name 2152          Living Environment    Lives With  alone  -LITO     Row Name 2152          Home Main Entrance    Number of Stairs, Main Entrance  none  -LITO     Row Name 2152          Stairs Within Home, Primary     Number of Stairs, Within Home, Primary  none  -LITO     Row Name 07/23/21 0952          Cognition    Orientation Status (Cognition)  oriented to;person;place;disoriented to;time Pt aware she is in the hospital but not which one  -LITO     Row Name 07/23/21 0952          Safety Issues, Functional Mobility    Safety Issues Affecting Function (Mobility)  awareness of need for assistance;friction/shear risk;insight into deficits/self-awareness;judgment;problem-solving;safety precaution awareness;safety precautions follow-through/compliance;sequencing abilities  -ILTO     Impairments Affecting Function (Mobility)  balance;cognition;coordination;endurance/activity tolerance;strength  -LITO     Cognitive Impairments, Mobility Safety/Performance  awareness, need for assistance;insight into deficits/self-awareness;judgment;problem-solving/reasoning;safety precaution awareness;safety precaution follow-through  -LITO       User Key  (r) = Recorded By, (t) = Taken By, (c) = Cosigned By    Initials Name Provider Type    Aubrie Mack OT Occupational Therapist          Mobility/ADL's     Row Name 07/23/21 0958          Bed Mobility    Bed Mobility  supine-sit;sit-supine  -LITO     Supine-Sit [place] (Bed Mobility)  verbal cues;minimum assist (75% patient effort);2 person assist  -LITO     Sit-Supine [place] (Bed Mobility)  verbal cues;minimum assist (75% patient effort);2 person assist  -LITO     Assistive Device (Bed Mobility)  bed rails;draw sheet;head of bed elevated  -LITO     Comment (Bed Mobility)  Pt completed with extra time and effort.  -LITO     Row Name 07/23/21 0958          Transfers    Transfers  sit-stand transfer  -LITO     Comment (Transfers)  STSx2 from EOB to RW with MinAx2, extra time needed for sidestepping to her R toward HOB  -LITO     Sit-Stand [place] (Transfers)  verbal cues;nonverbal cues (demo/gesture);minimum assist (75% patient effort);2 person assist  -LITO     Row Name 07/23/21 0958          Sit-Stand  Transfer    Assistive Device (Sit-Stand Transfers)  walker, front-wheeled  -LITO     Row Name 07/23/21 0958          Functional Mobility    Functional Mobility- Comment  Deferred  -LITO     Row Name 07/23/21 0958          Activities of Daily Living    BADL Assessment/Intervention  lower body dressing;grooming;feeding  -LITO     Row Name 07/23/21 0958          Lower Body Dressing Assessment/Training    [place] Level (Lower Body Dressing)  don;socks;dependent (less than 25% patient effort)  -LITO     Position (Lower Body Dressing)  sitting up in bed  -LITO     Comment (Lower Body Dressing)  Pt reports requiring occasional assist for donning socks and shoes at home.  -LITO     Row Name 07/23/21 0958          Grooming Assessment/Training    [place] Level (Grooming)  wash face, hands;set up  -LITO     Position (Grooming)  edge of bed sitting  -LITO     Comment (Grooming)  Declined oral care  -LITO     Row Name 07/23/21 0958          Self-Feeding Assessment/Training    [place] Level (Feeding)  liquids to mouth;prepare tray/open items;scoop food and bring to mouth;set up  -LITO     Position (Self-Feeding)  sitting up in bed  -LITO     Comment (Feeding)  Assist to open milk cartons, pt able to incorporate R hand in 2-handed tasks; reports she is L hand dominant since CVA.  -LITO       User Key  (r) = Recorded By, (t) = Taken By, (c) = Cosigned By    Initials Name Provider Type    Aubrie Mack OT Occupational Therapist        Obj/Interventions     Row Name 07/23/21 1004          Sensory Assessment (Somatosensory)    Sensory Assessment (Somatosensory)  UE sensation intact  -[facility] Name 07/23/21 1004          Vision Assessment/Intervention    Visual Impairment/Limitations  WFL;other (see comments) For purposes of eval  -LITO     Row Name 07/23/21 1004          Range of Motion Comprehensive    General Range of Motion  bilateral upper extremity ROM WFL  -LITO     Row Name 07/23/21 1004          Strength Comprehensive (MMT)     General Manual Muscle Testing (MMT) Assessment  upper extremity strength deficits identified  -LITO     Comment, General Manual Muscle Testing (MMT) Assessment  RUE grossly 4-/5; RUE grossly 4/5  -LITO     Row Name 07/23/21 1004          Balance    Balance Assessment  sitting static balance;sitting dynamic balance;standing static balance;standing dynamic balance  -LITO     Static Sitting Balance  WFL;unsupported;sitting, edge of bed  -LITO     Dynamic Sitting Balance  mild impairment;unsupported;sitting, edge of bed  -LITO     Static Standing Balance  mild impairment;supported;standing  -LITO     Dynamic Standing Balance  moderate impairment;supported;standing  -LITO     Balance Interventions  standing;weight shifting activity  -LITO     Comment, Balance  MinAx2 to take sidesteps to the R toward HOB.  -LITO       User Key  (r) = Recorded By, (t) = Taken By, (c) = Cosigned By    Initials Name Provider Type    Aubrie Mack OT Occupational Therapist        Goals/Plan     Row Name 07/23/21 0880          Bed Mobility Goal 1 (OT)    Activity/Assistive Device (Bed Mobility Goal 1, OT)  sit to supine/supine to sit  -LITO     [place] Level/Cues Needed (Bed Mobility Goal 1, OT)  contact guard assist  -LITO     Time Frame (Bed Mobility Goal 1, OT)  long term goal (LTG);by discharge  -LITO     Progress/Outcomes (Bed Mobility Goal 1, OT)  goal ongoing  -LITO     Row Name 07/23/21 5095          Transfer Goal 1 (OT)    Activity/Assistive Device (Transfer Goal 1, OT)  sit-to-stand/stand-to-sit;toilet;commode, bedside without drop arms  -LITO     [place] Level/Cues Needed (Transfer Goal 1, OT)  minimum assist (75% or more patient effort)  -LITO     Time Frame (Transfer Goal 1, OT)  long term goal (LTG);by discharge  -LITO     Progress/Outcome (Transfer Goal 1, OT)  goal ongoing  -LITO     Row Name 07/23/21 1142          Toileting Goal 1 (OT)    Activity/Device (Toileting Goal 1, OT)  adjust/manage clothing;perform perineal hygiene;commode, bedside  without drop arms  -LITO     [place] Level/Cues Needed (Toileting Goal 1, OT)  minimum assist (75% or more patient effort)  -LITO     Time Frame (Toileting Goal 1, OT)  long term goal (LTG);by discharge  -LITO     Progress/Outcome (Toileting Goal 1, OT)  goal ongoing  -LITO     Row Name 07/23/21 1147          Therapy Assessment/Plan (OT)    Planned Therapy Interventions (OT)  activity tolerance training;BADL retraining;functional balance retraining;occupation/activity based interventions;patient/caregiver education/training;prosthetic fitting/training;ROM/therapeutic exercise;strengthening exercise;transfer/mobility retraining  -LITO       User Key  (r) = Recorded By, (t) = Taken By, (c) = Cosigned By    Initials Name Provider Type    Aubrie Mack OT Occupational Therapist        Clinical Impression     Row Name 07/23/21 1006          Pain Scale: Numbers Pre/Post-Treatment    Pretreatment Pain Rating  0/10 - no pain  -LITO     Posttreatment Pain Rating  0/10 - no pain  -LITO     Row Name 07/23/21 1006          Plan of Care Review    Plan of Care Reviewed With  patient  -LITO     Outcome Summary  OT eval completed. Pt presents with generalized weakness and decreased activity tolerance limiting ADL's. Pt able to self-feed breakfast sitting up in bed with KESSLER, performed supine to sit with MinAx2, washed hands and face seated at EOB with KESSLER/SUP. Pt reports needing Sree for ADL's at baseline. OT to follow to progress self-care and safety. Recommend home with assist and HHOT at discharge.  -LITO     Row Name 07/23/21 1006          Therapy Assessment/Plan (OT)    Rehab Potential (OT)  good, to achieve stated therapy goals  -LITO     Criteria for Skilled Therapeutic Interventions Met (OT)  yes;meets criteria;skilled treatment is necessary  -LITO     Therapy Frequency (OT)  daily  -LITO     Row Name 07/23/21 1006          Therapy Plan Review/Discharge Plan (OT)    Anticipated Discharge Disposition (OT)  home with assist;home with home  health  -LITO     Row Name 07/23/21 1006          Vital Signs    Pre Systolic BP Rehab  127  -LITO     Pre Treatment Diastolic BP  56  -LITO     Pretreatment Heart Rate (beats/min)  69  -LITO     Pre SpO2 (%)  94  -LITO     Pre Patient Position  Supine  -LITO     Intra Patient Position  Standing  -LITO     Post Patient Position  Supine  -LITO     Row Name 07/23/21 1006          Positioning and Restraints    Pre-Treatment Position  in bed  -LITO     Post Treatment Position  bed  -LITO     In Bed  fowlers;call light within reach;encouraged to call for assist;exit alarm on;with nsg;with other staff  -LITO       User Key  (r) = Recorded By, (t) = Taken By, (c) = Cosigned By    Initials Name Provider Type    Aubrie Mack OT Occupational Therapist        Outcome Measures     Row Name 07/23/21 1149          How much help from another is currently needed...    Putting on and taking off regular lower body clothing?  1  -LITO     Bathing (including washing, rinsing, and drying)  2  -LITO     Toileting (which includes using toilet bed pan or urinal)  1  -LITO     Putting on and taking off regular upper body clothing  2  -LITO     Taking care of personal grooming (such as brushing teeth)  3  -LITO     Eating meals  3  -LITO     AM-PAC 6 Clicks Score (OT)  12  -LITO     Row Name 07/23/21 0958 07/23/21 0815       How much help from another person do you currently need...    Turning from your back to your side while in flat bed without using bedrails?  3  -SJ  3  -PS    Moving from lying on back to sitting on the side of a flat bed without bedrails?  2  -SJ  2  -PS    Moving to and from a bed to a chair (including a wheelchair)?  2  -SJ  2  -PS    Standing up from a chair using your arms (e.g., wheelchair, bedside chair)?  2  -SJ  2  -PS    Climbing 3-5 steps with a railing?  1  -SJ  2  -PS    To walk in hospital room?  2  -SJ  2  -PS    AM-PAC 6 Clicks Score (PT)  12  -SJ  13  -PS    Row Name 07/23/21 1149 07/23/21 0958       Functional Assessment    Outcome  Measure Options  AM-PAC 6 Clicks Daily Activity (OT)  -LITO  AM-PAC 6 Clicks Basic Mobility (PT)  -      User Key  (r) = Recorded By, (t) = Taken By, (c) = Cosigned By    Initials Name Provider Type    Shelbie De Oliveira, PT Physical Therapist    Rachelle Wing, RN Registered Nurse    Aubrie Mack, OT Occupational Therapist          Occupational Therapy Education                 Title: PT OT SLP Therapies (In Progress)     Topic: Occupational Therapy (In Progress)     Point: ADL training (Done)     Description:   Instruct learner(s) on proper safety adaptation and remediation techniques during self care or transfers.   Instruct in proper use of assistive devices.              Learning Progress Summary           Patient Acceptance, E, VU by LITO at 7/23/2021 1149    Comment: Role of OT; transfer training with  present for translation                   Point: Home exercise program (Not Started)     Description:   Instruct learner(s) on appropriate technique for monitoring, assisting and/or progressing therapeutic exercises/activities.              Learner Progress:  Not documented in this visit.          Point: Precautions (Done)     Description:   Instruct learner(s) on prescribed precautions during self-care and functional transfers.              Learning Progress Summary           Patient Acceptance, E, VU by LITO at 7/23/2021 1149    Comment: Role of OT; transfer training with  present for translation                   Point: Body mechanics (Done)     Description:   Instruct learner(s) on proper positioning and spine alignment during self-care, functional mobility activities and/or exercises.              Learning Progress Summary           Patient Acceptance, E, VU by LITO at 7/23/2021 1149    Comment: Role of OT; transfer training with  present for translation                               User Key     Initials Effective Dates Name Provider Type Discipline    LITO 06/16/21 -   Aubrie Cerna OT Occupational Therapist OT              OT Recommendation and Plan  Planned Therapy Interventions (OT): activity tolerance training, BADL retraining, functional balance retraining, occupation/activity based interventions, patient/caregiver education/training, prosthetic fitting/training, ROM/therapeutic exercise, strengthening exercise, transfer/mobility retraining  Therapy Frequency (OT): daily  Plan of Care Review  Plan of Care Reviewed With: patient  Outcome Summary: OT eval completed. Pt presents with generalized weakness and decreased activity tolerance limiting ADL's. Pt able to self-feed breakfast sitting up in bed with KESSLER, performed supine to sit with MinAx2, washed hands and face seated at EOB with KESSLER/SUP. Pt reports needing Sree for ADL's at baseline. OT to follow to progress self-care and safety. Recommend home with assist and HHOT at discharge.     Time Calculation:   Time Calculation- OT     Row Name 07/23/21 0920             Time Calculation- OT    OT Start Time  0920  -LITO      OT Received On  07/23/21  -LITO      OT Goal Re-Cert Due Date  08/02/21  -LITO         Untimed Charges    OT Eval/Re-eval Minutes  55  -LITO         Total Minutes    Untimed Charges Total Minutes  55  -LITO       Total Minutes  55  -LITO        User Key  (r) = Recorded By, (t) = Taken By, (c) = Cosigned By    Initials Name Provider Type    Aubrie Mack OT Occupational Therapist        Therapy Charges for Today     Code Description Service Date Service Provider Modifiers Qty    93077604216  OT EVAL MOD COMPLEXITY 4 7/23/2021 Aubrie Cerna OT GO 1               Aubrie Cerna OT  7/23/2021

## 2021-07-23 NOTE — PLAN OF CARE
Goal Outcome Evaluation:  Plan of Care Reviewed With: patient           Outcome Summary: PT eval completed. Pt presents with weakness, decreased functional mobility independence. Extra time and effort required for mobility due to need for [place] <> English . Pt req assist x2 for bed mobility, STS x2 reps with assist x2, and able to sidestep to HOB with RW. PT rec d/c home with HHPT vs rehab pending on family's ability to assist. No family present at time of eval.

## 2021-07-24 LAB
ANION GAP SERPL CALCULATED.3IONS-SCNC: 9 MMOL/L (ref 5–15)
BACTERIA SPEC AEROBE CULT: ABNORMAL
BASOPHILS # BLD AUTO: 0.02 10*3/MM3 (ref 0–0.2)
BASOPHILS NFR BLD AUTO: 0.4 % (ref 0–1.5)
BUN SERPL-MCNC: 12 MG/DL (ref 8–23)
BUN/CREAT SERPL: 15.4 (ref 7–25)
CALCIUM SPEC-SCNC: 8.5 MG/DL (ref 8.6–10.5)
CHLORIDE SERPL-SCNC: 105 MMOL/L (ref 98–107)
CO2 SERPL-SCNC: 26 MMOL/L (ref 22–29)
CREAT SERPL-MCNC: 0.78 MG/DL (ref 0.57–1)
DEPRECATED RDW RBC AUTO: 48.9 FL (ref 37–54)
EOSINOPHIL # BLD AUTO: 0.14 10*3/MM3 (ref 0–0.4)
EOSINOPHIL NFR BLD AUTO: 3 % (ref 0.3–6.2)
ERYTHROCYTE [DISTWIDTH] IN BLOOD BY AUTOMATED COUNT: 13.8 % (ref 12.3–15.4)
GFR SERPL CREATININE-BSD FRML MDRD: 71 ML/MIN/1.73
GLUCOSE BLDC GLUCOMTR-MCNC: 205 MG/DL (ref 70–130)
GLUCOSE BLDC GLUCOMTR-MCNC: 284 MG/DL (ref 70–130)
GLUCOSE BLDC GLUCOMTR-MCNC: 307 MG/DL (ref 70–130)
GLUCOSE BLDC GLUCOMTR-MCNC: 334 MG/DL (ref 70–130)
GLUCOSE SERPL-MCNC: 215 MG/DL (ref 65–99)
HCT VFR BLD AUTO: 35.5 % (ref 34–46.6)
HGB BLD-MCNC: 11 G/DL (ref 12–15.9)
IMM GRANULOCYTES # BLD AUTO: 0.01 10*3/MM3 (ref 0–0.05)
IMM GRANULOCYTES NFR BLD AUTO: 0.2 % (ref 0–0.5)
LYMPHOCYTES # BLD AUTO: 1.57 10*3/MM3 (ref 0.7–3.1)
LYMPHOCYTES NFR BLD AUTO: 33.5 % (ref 19.6–45.3)
MCH RBC QN AUTO: 29.9 PG (ref 26.6–33)
MCHC RBC AUTO-ENTMCNC: 31 G/DL (ref 31.5–35.7)
MCV RBC AUTO: 96.5 FL (ref 79–97)
MONOCYTES # BLD AUTO: 0.4 10*3/MM3 (ref 0.1–0.9)
MONOCYTES NFR BLD AUTO: 8.5 % (ref 5–12)
NEUTROPHILS NFR BLD AUTO: 2.55 10*3/MM3 (ref 1.7–7)
NEUTROPHILS NFR BLD AUTO: 54.4 % (ref 42.7–76)
NRBC BLD AUTO-RTO: 0 /100 WBC (ref 0–0.2)
PLATELET # BLD AUTO: 111 10*3/MM3 (ref 140–450)
PMV BLD AUTO: 12.9 FL (ref 6–12)
POTASSIUM SERPL-SCNC: 4 MMOL/L (ref 3.5–5.2)
RBC # BLD AUTO: 3.68 10*6/MM3 (ref 3.77–5.28)
SODIUM SERPL-SCNC: 140 MMOL/L (ref 136–145)
WBC # BLD AUTO: 4.69 10*3/MM3 (ref 3.4–10.8)

## 2021-07-24 PROCEDURE — 80048 BASIC METABOLIC PNL TOTAL CA: CPT | Performed by: INTERNAL MEDICINE

## 2021-07-24 PROCEDURE — 25010000002 CEFTRIAXONE PER 250 MG: Performed by: INTERNAL MEDICINE

## 2021-07-24 PROCEDURE — 96361 HYDRATE IV INFUSION ADD-ON: CPT

## 2021-07-24 PROCEDURE — 63710000001 INSULIN DETEMIR PER 5 UNITS: Performed by: INTERNAL MEDICINE

## 2021-07-24 PROCEDURE — 99226 PR SBSQ OBSERVATION CARE/DAY 35 MINUTES: CPT | Performed by: INTERNAL MEDICINE

## 2021-07-24 PROCEDURE — G0378 HOSPITAL OBSERVATION PER HR: HCPCS

## 2021-07-24 PROCEDURE — 82962 GLUCOSE BLOOD TEST: CPT

## 2021-07-24 PROCEDURE — 96366 THER/PROPH/DIAG IV INF ADDON: CPT

## 2021-07-24 PROCEDURE — 63710000001 INSULIN LISPRO (HUMAN) PER 5 UNITS: Performed by: INTERNAL MEDICINE

## 2021-07-24 PROCEDURE — 85025 COMPLETE CBC W/AUTO DIFF WBC: CPT | Performed by: INTERNAL MEDICINE

## 2021-07-24 RX ORDER — LANOLIN ALCOHOL/MO/W.PET/CERES
1000 CREAM (GRAM) TOPICAL DAILY
COMMUNITY

## 2021-07-24 RX ORDER — CHOLECALCIFEROL (VITAMIN D3) 50 MCG
2000 TABLET ORAL DAILY
COMMUNITY

## 2021-07-24 RX ADMIN — NYSTATIN: 100000 POWDER TOPICAL at 08:01

## 2021-07-24 RX ADMIN — SODIUM CHLORIDE 100 ML/HR: 9 INJECTION, SOLUTION INTRAVENOUS at 02:13

## 2021-07-24 RX ADMIN — SODIUM CHLORIDE, PRESERVATIVE FREE 10 ML: 5 INJECTION INTRAVENOUS at 20:35

## 2021-07-24 RX ADMIN — DOCUSATE SODIUM 50MG AND SENNOSIDES 8.6MG 2 TABLET: 8.6; 5 TABLET, FILM COATED ORAL at 20:34

## 2021-07-24 RX ADMIN — ACETAMINOPHEN 650 MG: 325 TABLET ORAL at 02:13

## 2021-07-24 RX ADMIN — NYSTATIN: 100000 POWDER TOPICAL at 20:34

## 2021-07-24 RX ADMIN — SODIUM CHLORIDE, PRESERVATIVE FREE 10 ML: 5 INJECTION INTRAVENOUS at 08:00

## 2021-07-24 RX ADMIN — LOSARTAN POTASSIUM 25 MG: 25 TABLET, FILM COATED ORAL at 08:01

## 2021-07-24 RX ADMIN — DOCUSATE SODIUM 50MG AND SENNOSIDES 8.6MG 2 TABLET: 8.6; 5 TABLET, FILM COATED ORAL at 08:00

## 2021-07-24 RX ADMIN — INSULIN DETEMIR 15 UNITS: 100 INJECTION, SOLUTION SUBCUTANEOUS at 20:36

## 2021-07-24 RX ADMIN — INSULIN LISPRO 3 UNITS: 100 INJECTION, SOLUTION INTRAVENOUS; SUBCUTANEOUS at 08:00

## 2021-07-24 RX ADMIN — CARVEDILOL 6.25 MG: 6.25 TABLET, FILM COATED ORAL at 17:54

## 2021-07-24 RX ADMIN — RIVAROXABAN 20 MG: 20 TABLET, FILM COATED ORAL at 17:54

## 2021-07-24 RX ADMIN — MICONAZOLE NITRATE 1 APPLICATION: 2 CREAM TOPICAL at 20:44

## 2021-07-24 RX ADMIN — INSULIN LISPRO 5 UNITS: 100 INJECTION, SOLUTION INTRAVENOUS; SUBCUTANEOUS at 17:54

## 2021-07-24 RX ADMIN — SODIUM CHLORIDE 100 ML/HR: 9 INJECTION, SOLUTION INTRAVENOUS at 11:16

## 2021-07-24 RX ADMIN — INSULIN LISPRO 4 UNITS: 100 INJECTION, SOLUTION INTRAVENOUS; SUBCUTANEOUS at 12:31

## 2021-07-24 RX ADMIN — CEFTRIAXONE SODIUM 1 G: 1 INJECTION, SOLUTION INTRAVENOUS at 12:32

## 2021-07-24 NOTE — PLAN OF CARE
Goal Outcome Evaluation:  Plan of Care Reviewed With: patient      VSS. SB-NSR on telemetry monitor. Pt has maintained adequate oxygen saturations on 3L nasal cannula. Pt has been alert and oriented. Pt has had adequate UOP. Pt did have c/o headache, and verbalized relief with Tylenol. She has remained calm and pleasant and has rested peacefully throughout the night. No other problems identified. Will continue to monitor.

## 2021-07-24 NOTE — PROGRESS NOTES
[facility] Medicine Services  PROGRESS NOTE    Patient Name: Angeles Hoffman  : 1940  MRN: [id number]    Date of Admission: 2021  Primary Care Physician: Provider, No Known    Subjective   Subjective     CC:  Follow up for AMS    HPI:  Used [place] . She is alert, oriented to person, place, and time. Denies any problems breathing, cough, fever, or chills. Denies dysuria or problems urinating.    ROS:  CV- No chest pain, palpitations  GI- No N/V/D, abd pain     Objective   Objective     Vital Signs:   Temp:  [97.6 °F (36.4 °C)-98.4 °F (36.9 °C)] 98.4 °F (36.9 °C)  Heart Rate:  [50-59] 58  Resp:  [16] 16  BP: (118-150)/(51-71) 135/53  Flow (L/min):  [2-3] 2     Physical Exam:  Constitutional: No acute distress, awake, alert  HENT: NCAT, mucous membranes moist  Respiratory: Clear to auscultation bilaterally, respiratory effort normal on nasal canula   Cardiovascular: RRR, no murmurs  Gastrointestinal: Positive bowel sounds, soft, nontender, nondistended  Psychiatric: Appropriate affect, cooperative  Neurologic: Oriented x 3, moving all extremities equally, Cranial Nerves grossly intact to confrontation, speech clear  Skin: No rashes    Results Reviewed:  LAB RESULTS:      Lab 21  0441 21  0409 21  1032 21  0540   WBC 4.69 5.26  --  7.99   HEMOGLOBIN 11.0* 10.9*  --  12.4   HEMATOCRIT 35.5 35.1  --  39.9   PLATELETS 111* 118*  --  128*   NEUTROS ABS 2.55 2.56  --  6.23   IMMATURE GRANS (ABS) 0.01 0.01  --  0.03   LYMPHS ABS 1.57 2.20  --  1.23   MONOS ABS 0.40 0.42  --  0.46   EOS ABS 0.14 0.05  --  0.02   MCV 96.5 94.4  --  94.5   PROCALCITONIN  --   --   --  0.08   LACTATE  --   --  2.8* 2.6*   PROTIME  --   --  13.1  --          Lab 21  0441 21  0409 21  1332 21  0540   SODIUM 140 140 136 138   POTASSIUM 4.0 3.7 3.7 4.4   CHLORIDE 105 104 95* 95*   CO2 26.0 28.0 29.0 27.0   ANION GAP 9.0 8.0 12.0 16.0*   BUN 12 13 17 24*    CREATININE 0.78 0.90 0.87 1.20*   GLUCOSE 215* 158* 209* 345*   CALCIUM 8.5* 8.5* 9.2 9.6   TSH  --   --   --  1.470         Lab 07/23/21  0409 07/22/21  0540   TOTAL PROTEIN 5.7* 7.2   ALBUMIN 3.30* 4.30   GLOBULIN 2.4 2.9   ALT (SGPT) 11 19   AST (SGOT) 14 20   BILIRUBIN 0.2 0.3   ALK PHOS 62 86   LIPASE  --  43         Lab 07/22/21  1032 07/22/21  0540   PROBNP  --  800.5   TROPONIN T  --  <0.010   PROTIME 13.1  --    INR 1.02  --                  Lab 07/22/21  0538   PH, ARTERIAL 7.405   PCO2, ARTERIAL 48.4*   PO2 ART 69.6*   FIO2 32   HCO3 ART 30.3*   BASE EXCESS ART 4.7*     Brief Urine Lab Results  (Last result in the past 365 days)      Color   Clarity   Blood   Leuk Est   Nitrite   Protein   CREAT   Urine HCG        07/22/21 0540 Yellow Clear Negative Negative Positive 30 mg/dL (1+)               Microbiology Results Abnormal     Procedure Component Value - Date/Time    Blood Culture - Blood, Arm, Right [[id number]] Collected: 07/22/21 0610    Lab Status: Preliminary result Specimen: Blood from Arm, Right Updated: 07/24/21 0645     Blood Culture No growth at 2 days    Blood Culture - Blood, Arm, Right [[id number]] Collected: 07/22/21 0600    Lab Status: Preliminary result Specimen: Blood from Arm, Right Updated: 07/24/21 0645     Blood Culture No growth at 2 days    COVID PRE-OP / PRE-PROCEDURE SCREENING ORDER (NO ISOLATION) - Swab, Nasopharynx [[id number]]  (Normal) Collected: 07/22/21 0540    Lab Status: Final result Specimen: Swab from Nasopharynx Updated: 07/22/21 0616    Narrative:      The following orders were created for panel order COVID PRE-OP / PRE-PROCEDURE SCREENING ORDER (NO ISOLATION) - Swab, Nasopharynx.  Procedure                               Abnormality         Status                     ---------                               -----------         ------                     COVID-19 and FLU A/B PCR...[[id number]]  Normal              Final result                 Please view results for these  tests on the individual orders.    COVID-19 and FLU A/B PCR - Swab, Nasopharynx [[id number]]  (Normal) Collected: 07/22/21 0540    Lab Status: Final result Specimen: Swab from Nasopharynx Updated: 07/22/21 0616     COVID19 Not Detected     Influenza A PCR Not Detected     Influenza B PCR Not Detected    Narrative:      Fact sheet for providers: https://www.fda.gov/media/627639/download    Fact sheet for patients: https://www.fda.gov/media/133154/download    Test performed by PCR.          No radiology results from the last 24 hrs        I have reviewed the medications:  Scheduled Meds:carvedilol, 6.25 mg, Oral, BID With Meals  cefTRIAXone, 1 g, Intravenous, Q24H  insulin lispro, 0-7 Units, Subcutaneous, TID AC  losartan, 25 mg, Oral, Q24H  nystatin, , Topical, Q12H  rivaroxaban, 20 mg, Oral, Daily With Dinner  senna-docusate sodium, 2 tablet, Oral, BID  sodium chloride, 10 mL, Intravenous, Q12H      Continuous Infusions:sodium chloride, 100 mL/hr, Last Rate: 100 mL/hr (07/24/21 1116)      PRN Meds:.•  acetaminophen **OR** acetaminophen **OR** acetaminophen  •  senna-docusate sodium **AND** polyethylene glycol **AND** bisacodyl **AND** bisacodyl  •  dextrose  •  dextrose  •  glucagon (human recombinant)  •  labetalol  •  sodium chloride    Assessment/Plan   Assessment & Plan     Active Hospital Problems    Diagnosis  POA   • Altered mental status [R41.82]  Yes   • Type 2 diabetes mellitus with hyperglycemia (CMS/HCC) [E11.65]  Yes   • Elevated serum creatinine [R79.89]  Yes   • Hypertensive urgency [I16.0]  Yes      Resolved Hospital Problems   No resolved problems to display.        Brief Hospital Course to date:  Angeles Hoffman is a 80 y.o. female female Italian speaking, with PMH of T2DM, hx of CVA 5 years, CAD s/p MI, and HTN who presented to the ED with AMS. Found to have abnormal UA and hyperglycemia with mild anion gap increase and glucosuria.      Lethargy, confusion - resolved   Metabolic encephalopathy -  resolved   -CTH w/o contrast without acute abnormality , CT a/p without acute intra abdominal abnormality, CXR without acute findings   -likely 2/2 UTI and mild DKA   -will treat with antibiotics, IVF, and insulin and monitor for improvement     Klebsiella UTI  -continue ceftriaxone IV      Mild DKA - resolved   -elevated blood sugar up to 500 per  report  -anion gap mildly elevated   -apparently on glipizide and metformin, but has not been able to refill as they just moved from [facility] and had a difficult time transferring medications here      Hypertensive urgency   -resume losartan and coreg, also on hydralazine and imdur, but blood pressure improved on the floor so will monitor and resume as appropriate   -labetalol prn     Mild thrombocytopenia - stable   -continue to monitor CBC  -no s/s of bleeding      ? A fib   -EKG here with NSR  -xarelto on med list, will continue     ?Hypoxia   -no known history of COPD  -CXR without acute abnormality   -wean off oxygen     Elevated creatinine - resolved      Lactic acidosis - resolved     Hx of CVA - residual right sided weakness   Hx of CAD     DVT prophylaxis:  On xarelto      Disposition: I expect the patient to be discharged home tomorrow.    CODE STATUS:   Code Status and Medical Interventions:   Ordered at: 07/22/21 0851     Code Status:    CPR     Medical Interventions (Level of Support Prior to Arrest):    Full       Arely Moreno MD  07/24/21

## 2021-07-25 VITALS
SYSTOLIC BLOOD PRESSURE: 144 MMHG | WEIGHT: 228.4 LBS | DIASTOLIC BLOOD PRESSURE: 65 MMHG | HEIGHT: 63 IN | TEMPERATURE: 98 F | BODY MASS INDEX: 40.47 KG/M2 | RESPIRATION RATE: 18 BRPM | OXYGEN SATURATION: 97 % | HEART RATE: 75 BPM

## 2021-07-25 LAB
GLUCOSE BLDC GLUCOMTR-MCNC: 102 MG/DL (ref 70–130)
GLUCOSE BLDC GLUCOMTR-MCNC: 154 MG/DL (ref 70–130)

## 2021-07-25 PROCEDURE — G0378 HOSPITAL OBSERVATION PER HR: HCPCS

## 2021-07-25 PROCEDURE — 63710000001 INSULIN LISPRO (HUMAN) PER 5 UNITS: Performed by: INTERNAL MEDICINE

## 2021-07-25 PROCEDURE — 25010000002 CEFTRIAXONE PER 250 MG: Performed by: INTERNAL MEDICINE

## 2021-07-25 PROCEDURE — 99217 PR OBSERVATION CARE DISCHARGE MANAGEMENT: CPT | Performed by: INTERNAL MEDICINE

## 2021-07-25 PROCEDURE — 82962 GLUCOSE BLOOD TEST: CPT

## 2021-07-25 RX ORDER — ISOSORBIDE MONONITRATE 30 MG/1
30 TABLET, EXTENDED RELEASE ORAL DAILY
Qty: 30 TABLET | Refills: 0 | Status: SHIPPED | OUTPATIENT
Start: 2021-07-25

## 2021-07-25 RX ORDER — GLIMEPIRIDE 2 MG/1
2 TABLET ORAL 2 TIMES DAILY
Qty: 60 TABLET | Refills: 0 | Status: SHIPPED | OUTPATIENT
Start: 2021-07-25

## 2021-07-25 RX ORDER — HYDRALAZINE HYDROCHLORIDE 10 MG/1
10 TABLET, FILM COATED ORAL EVERY 8 HOURS SCHEDULED
Status: DISCONTINUED | OUTPATIENT
Start: 2021-07-25 | End: 2021-07-25 | Stop reason: HOSPADM

## 2021-07-25 RX ORDER — ISOSORBIDE MONONITRATE 30 MG/1
30 TABLET, EXTENDED RELEASE ORAL
Status: DISCONTINUED | OUTPATIENT
Start: 2021-07-25 | End: 2021-07-25 | Stop reason: HOSPADM

## 2021-07-25 RX ADMIN — POLYETHYLENE GLYCOL 3350 17 G: 17 POWDER, FOR SOLUTION ORAL at 09:26

## 2021-07-25 RX ADMIN — DOCUSATE SODIUM 50MG AND SENNOSIDES 8.6MG 2 TABLET: 8.6; 5 TABLET, FILM COATED ORAL at 09:25

## 2021-07-25 RX ADMIN — NYSTATIN: 100000 POWDER TOPICAL at 09:26

## 2021-07-25 RX ADMIN — MICONAZOLE NITRATE 1 APPLICATION: 2 CREAM TOPICAL at 12:43

## 2021-07-25 RX ADMIN — LOSARTAN POTASSIUM 25 MG: 25 TABLET, FILM COATED ORAL at 09:25

## 2021-07-25 RX ADMIN — ISOSORBIDE MONONITRATE 30 MG: 30 TABLET, EXTENDED RELEASE ORAL at 09:25

## 2021-07-25 RX ADMIN — INSULIN LISPRO 2 UNITS: 100 INJECTION, SOLUTION INTRAVENOUS; SUBCUTANEOUS at 12:43

## 2021-07-25 RX ADMIN — CARVEDILOL 6.25 MG: 6.25 TABLET, FILM COATED ORAL at 10:22

## 2021-07-25 RX ADMIN — HYDRALAZINE HYDROCHLORIDE 10 MG: 10 TABLET, FILM COATED ORAL at 14:07

## 2021-07-25 RX ADMIN — CEFTRIAXONE SODIUM 1 G: 1 INJECTION, SOLUTION INTRAVENOUS at 14:02

## 2021-07-25 RX ADMIN — HYDRALAZINE HYDROCHLORIDE 10 MG: 10 TABLET, FILM COATED ORAL at 09:25

## 2021-07-25 RX ADMIN — SODIUM CHLORIDE, PRESERVATIVE FREE 10 ML: 5 INJECTION INTRAVENOUS at 09:27

## 2021-07-25 NOTE — DISCHARGE SUMMARY
[facility] Medicine Services  DISCHARGE SUMMARY    Patient Name: Angeles Hoffman  : 1940  MRN: [id number]    Date of Admission: 2021  4:57 AM  Date of Discharge:  2021  Primary Care Physician: Provider, No Known    Consults     No orders found from 2021 to 2021.          Hospital Course     Presenting Problem:   Acute sepsis (CMS/HCC) [A41.9]  Altered mental status [R41.82]    Active Hospital Problems    Diagnosis  POA   • Altered mental status [R41.82]  Yes   • Type 2 diabetes mellitus with hyperglycemia (CMS/HCC) [E11.65]  Yes   • Elevated serum creatinine [R79.89]  Yes   • Hypertensive urgency [I16.0]  Yes      Resolved Hospital Problems   No resolved problems to display.          Hospital Course:  Angeles Hoffman is a 80 y.o. female Armenian speaking, with PMH of T2DM, hx of CVA 5 years, CAD s/p MI, and HTN who presented to the ED with AMS. Found to have abnormal UA and hyperglycemia with mild anion gap increase and glucosuria.      Lethargy, confusion - resolved   Metabolic encephalopathy - resolved   -CTH w/o contrast without acute abnormality , CT a/p without acute intra abdominal abnormality, CXR without acute findings   -likely 2/2 UTI and mild DKA     Klebsiella UTI  -treated with ceftriaxone IV x3 days     Mild DKA - resolved   -resume home metformin and amaryl. Also on Lantus 15 units QHS, resume at discharge. (apparently this was filled 21 per pharmacy med rec. Will send refills just in case)  -Hgb A1c 11, so will need close f/u with PCP     Hypertensive urgency   Resolved, resume home regimen, hyralazine, imdur, losartan, coreg, lasix     Mild thrombocytopenia - stable   -no s/s of bleeding, unclear If this is chronic or not     ? A fib   -xarelto on med list, continue     Hypoxia   -no known history of COPD  -CXR without acute abnormality, suspect component of NAMAN/OHS     Lactic acidosis - resolved      Hx of CVA    residual right sided weakness      Hx of CAD    LLE Skin rash  - perhaps this is why she is on daily prednisone, unclear?    Discharge Follow Up Recommendations for outpatient labs/diagnostics:  - f/u with PCP in one week (patient not able to give me the name of PCP, reportedly follows with someone in [place])    Day of Discharge     HPI:   Patient reports she is feeling well she complains of some itching on her leg. Otherwise she feels well and would like to go home (used [place]ic )    Review of Systems  Gen- No fevers, chills  CV- No chest pain, palpitations  Resp- No cough, dyspnea  GI- No N/V/D, abd pain    Vital Signs:   Temp:  [97.8 °F (36.6 °C)-98.3 °F (36.8 °C)] 98 °F (36.7 °C)  Heart Rate:  [57-63] 63  Resp:  [16-18] 18  BP: (123-180)/(50-69) 123/59     Physical Exam:  Constitutional: No acute distress, awake, alert, obese female  HENT: NCAT, mucous membranes moist  Respiratory: Clear to auscultation bilaterally, respiratory effort normal   Cardiovascular: RRR, no murmurs, rubs, or gallops  Gastrointestinal: Positive bowel sounds, soft, nontender, nondistended  Musculoskeletal: No bilateral ankle edema  Psychiatric: Appropriate affect, cooperative  Neurologic: Oriented x 3, strength symmetric in all extremities, Cranial Nerves grossly intact to confrontation, speech clear  Skin: LLE with some circular appearing areas of erythema    Pertinent  and/or Most Recent Results     LAB RESULTS:      Lab 07/24/21  0441 07/23/21  0409 07/22/21  1032 07/22/21  0540   WBC 4.69 5.26  --  7.99   HEMOGLOBIN 11.0* 10.9*  --  12.4   HEMATOCRIT 35.5 35.1  --  39.9   PLATELETS 111* 118*  --  128*   NEUTROS ABS 2.55 2.56  --  6.23   IMMATURE GRANS (ABS) 0.01 0.01  --  0.03   LYMPHS ABS 1.57 2.20  --  1.23   MONOS ABS 0.40 0.42  --  0.46   EOS ABS 0.14 0.05  --  0.02   MCV 96.5 94.4  --  94.5   PROCALCITONIN  --   --   --  0.08   LACTATE  --   --  2.8* 2.6*   PROTIME  --   --  13.1  --          Lab 07/24/21  0441 07/23/21  0409 07/22/21  8895  07/22/21  0540   SODIUM 140 140 136 138   POTASSIUM 4.0 3.7 3.7 4.4   CHLORIDE 105 104 95* 95*   CO2 26.0 28.0 29.0 27.0   ANION GAP 9.0 8.0 12.0 16.0*   BUN 12 13 17 24*   CREATININE 0.78 0.90 0.87 1.20*   GLUCOSE 215* 158* 209* 345*   CALCIUM 8.5* 8.5* 9.2 9.6   TSH  --   --   --  1.470         Lab 07/23/21  0409 07/22/21  0540   TOTAL PROTEIN 5.7* 7.2   ALBUMIN 3.30* 4.30   GLOBULIN 2.4 2.9   ALT (SGPT) 11 19   AST (SGOT) 14 20   BILIRUBIN 0.2 0.3   ALK PHOS 62 86   LIPASE  --  43         Lab 07/22/21  1032 07/22/21  0540   PROBNP  --  800.5   TROPONIN T  --  <0.010   PROTIME 13.1  --    INR 1.02  --                  Lab 07/22/21  0538   PH, ARTERIAL 7.405   PCO2, ARTERIAL 48.4*   PO2 ART 69.6*   FIO2 32   HCO3 ART 30.3*   BASE EXCESS ART 4.7*     Brief Urine Lab Results  (Last result in the past 365 days)      Color   Clarity   Blood   Leuk Est   Nitrite   Protein   CREAT   Urine HCG        07/22/21 0540 Yellow Clear Negative Negative Positive 30 mg/dL (1+)             Microbiology Results (last 10 days)     Procedure Component Value - Date/Time    Blood Culture - Blood, Arm, Right [[id number]] Collected: 07/22/21 0610    Lab Status: Preliminary result Specimen: Blood from Arm, Right Updated: 07/25/21 0645     Blood Culture No growth at 3 days    Blood Culture - Blood, Arm, Right [[id number]] Collected: 07/22/21 0600    Lab Status: Preliminary result Specimen: Blood from Arm, Right Updated: 07/25/21 0645     Blood Culture No growth at 3 days    COVID PRE-OP / PRE-PROCEDURE SCREENING ORDER (NO ISOLATION) - Swab, Nasopharynx [[id number]]  (Normal) Collected: 07/22/21 0540    Lab Status: Final result Specimen: Swab from Nasopharynx Updated: 07/22/21 0616    Narrative:      The following orders were created for panel order COVID PRE-OP / PRE-PROCEDURE SCREENING ORDER (NO ISOLATION) - Swab, Nasopharynx.  Procedure                               Abnormality         Status                     ---------                                -----------         ------                     COVID-19 and FLU A/B PCR...[[id number]]  Normal              Final result                 Please view results for these tests on the individual orders.    COVID-19 and FLU A/B PCR - Swab, Nasopharynx [[id number]]  (Normal) Collected: 07/22/21 0540    Lab Status: Final result Specimen: Swab from Nasopharynx Updated: 07/22/21 0616     COVID19 Not Detected     Influenza A PCR Not Detected     Influenza B PCR Not Detected    Narrative:      Fact sheet for providers: https://www.fda.gov/media/944090/download    Fact sheet for patients: https://www.fda.gov/media/539260/download    Test performed by PCR.    Urine Culture - Urine, Urine, Catheter [[id number]]  (Abnormal)  (Susceptibility) Collected: 07/22/21 0540    Lab Status: Final result Specimen: Urine, Catheter Updated: 07/24/21 1008     Urine Culture >100,000 CFU/mL Klebsiella pneumoniae ssp pneumoniae    Susceptibility      Klebsiella pneumoniae ssp pneumoniae      RAUL      Ampicillin Resistant      Ampicillin + Sulbactam Resistant      Cefazolin Susceptible      Cefepime Susceptible      Ceftazidime Susceptible      Ceftriaxone Susceptible      Gentamicin Susceptible      Levofloxacin Susceptible      Nitrofurantoin Intermediate      Piperacillin + Tazobactam Susceptible      Tetracycline Susceptible      Trimethoprim + Sulfamethoxazole Susceptible               Linear View                         CT Abdomen Pelvis Without Contrast    Result Date: 7/22/2021  CT Abdomen Pelvis WO INDICATION: Weakness and confusion. Infection. TECHNIQUE: CT of the abdomen and pelvis without IV contrast. Coronal and sagittal reconstructions were obtained.  Radiation dose reduction techniques included automated exposure control or exposure modulation based on body size. Count of known CT and cardiac nuc med studies performed in previous 12 months: 1. COMPARISON: None available. FINDINGS: Abdomen: The liver, spleen and pancreas are  normal in size. No evidence of hydronephrosis or kidney stone disease. No adrenal masses. No evidence of retroperitoneal adenopathy. No distended bowel loops are seen. There is a small umbilical hernia that contains fat only. Pelvis: The appendix is not identified but no inflammatory changes are seen in the right lower quadrant. Multiple calcified uterine fibroids are noted. No evidence of pelvic abscess or adenopathy. There is a chronic-appearing partial compression fracture of L1.     No acute or inflammatory changes are seen in the abdomen or pelvis. Signer Name: Tam Carmen MD  Signed: 7/22/2021 6:13 AM  Work[facility]    CT Head Without Contrast    Result Date: 7/22/2021  CT Head WO HISTORY: Weakness and confusion TECHNIQUE: Axial unenhanced head CT. Radiation dose reduction techniques included automated exposure control or exposure modulation based on body size. Count of known CT and cardiac nuc med studies performed in previous 12 months: 0. Time of scan: 5:54 AM COMPARISON: None. FINDINGS: No intracranial hemorrhage, mass, or infarct. No hydrocephalus or extra-axial fluid collection. There are senescent changes, including volume loss and nonspecific white matter change, but no acute abnormality is seen. The skull base, calvarium, and extracranial soft tissues are normal.     Senescent changes without acute abnormality. Signer Name: Tam Carmen MD  Signed: 7/22/2021 6:07 AM  Workstation Name: [facility]  Radiology [facility]    XR Chest 1 View    Result Date: 7/22/2021  CR Chest 1 Vw INDICATION: Sepsis with hypoxia COMPARISON:  None available. FINDINGS: Single portable AP view(s) of the chest.  Cardiomegaly is seen. Postoperative changes of median sternotomy are noted. The lungs are clear with normal vascular markings. No effusions are seen.     Cardiomegaly. No acute findings in the lungs. Signer Name: Tam Carmen MD  Signed: 7/22/2021  7:21 AM  Workstation Name: [id number]  [facility]                  Plan for Follow-up of Pending Labs/Results:   Pending Labs     Order Current Status    Blood Culture - Blood, Arm, Right Preliminary result    Blood Culture - Blood, Arm, Right Preliminary result        Discharge Details        Discharge Medications      Continue These Medications      Instructions Start Date   acetaminophen 325 MG tablet  Commonly known as: TYLENOL   650 mg, Oral, Every 4 Hours PRN      carvedilol 6.25 MG tablet  Commonly known as: COREG   6.25 mg, Oral, 2 times daily      furosemide 20 MG tablet  Commonly known as: LASIX   20 mg, Oral, Daily      glimepiride 2 MG tablet  Commonly known as: AMARYL   2 mg, Oral, 2 times daily      hydrALAZINE 10 MG tablet  Commonly known as: APRESOLINE   10 mg, Oral, 3 Times Daily      Insulin Glargine 100 UNIT/ML injection pen  Commonly known as: LANTUS SOLOSTAR   15 Units, Subcutaneous, Nightly      isosorbide mononitrate 30 MG 24 hr tablet  Commonly known as: IMDUR   30 mg, Oral, Daily PRN      loperamide 2 MG capsule  Commonly known as: IMODIUM   2 mg, Oral, 4 Times Daily PRN      losartan 25 MG tablet  Commonly known as: COZAAR   25 mg, Oral, Daily      metFORMIN 1000 MG tablet  Commonly known as: GLUCOPHAGE   1,000 mg, Oral, 2 Times Daily With Meals      predniSONE 20 MG tablet  Commonly known as: DELTASONE   20 mg, Oral, Daily      rivaroxaban 20 MG tablet  Commonly known as: XARELTO   20 mg, Oral, Daily With Dinner      rosuvastatin 40 MG tablet  Commonly known as: CRESTOR   40 mg, Oral, Nightly      SITagliptin 50 MG tablet  Commonly known as: JANUVIA   50 mg, Oral, Daily      vitamin B-12 1000 MCG tablet  Commonly known as: CYANOCOBALAMIN   1,000 mcg, Oral, Daily      Vitamin D 50 MCG (2000 UT) tablet   2,000 Units, Oral, Daily             Not on File      Discharge Disposition:  Home or Self Care    Diet:  Hospital:  Diet Order   Procedures   • Diet Soft Texture;  Chopped; Thin       Activity:  - as tolerated    Restrictions or Other Recommendations:  - none       CODE STATUS:    Code Status and Medical Interventions:   Ordered at: 07/22/21 0851     Code Status:    CPR     Medical Interventions (Level of Support Prior to Arrest):    Full       No future appointments.    Additional Instructions for the Follow-ups that You Need to Schedule     Ambulatory Referral to Home Health   As directed      Face to Face Visit Date: 7/23/2021    Follow-up provider for Plan of Care?: I treated the patient in an acute care facility and will not continue treatment after discharge.    Follow-up provider: DEISI DALEY [[id number]]    Reason/Clinical Findings: AMS/UTI/hyperglycemia    Describe mobility limitations that make leaving home difficult: Impaired functional gait, balance and endurance    Nursing/Therapeutic Services Requested: Physical Therapy Occupational Therapy    PT orders: Strengthening Home safety assessment Ultrasound Gait Training    Weight Bearing Status: As Tolerated    Occupational orders: Activities of daily living Home safety assessment    Frequency: 1 Week 1                     Erica Flanagan DO  07/25/21      Time Spent on Discharge:  I spent  35 minutes on this discharge activity which included: face-to-face encounter with the patient, reviewing the data in the system, coordination of the care with the nursing staff as well as consultants, documentation, and entering orders.

## 2021-07-25 NOTE — PLAN OF CARE
Problem: Fall Injury Risk  Goal: Absence of Fall and Fall-Related Injury  Outcome: Ongoing, Progressing  Intervention: Identify and Manage Contributors to Fall Injury Risk  Recent Flowsheet Documentation  Taken 7/25/2021 0600 by Tresa Diehl RN  Medication Review/Management: medications reviewed  Taken 7/25/2021 0200 by Tresa Diehl RN  Medication Review/Management: medications reviewed  Taken 7/25/2021 0000 by Tresa Diehl RN  Medication Review/Management: medications reviewed  Taken 7/24/2021 2010 by Tresa Diehl RN  Medication Review/Management: medications reviewed  Intervention: Promote Injury-Free Environment  Recent Flowsheet Documentation  Taken 7/25/2021 0600 by Tresa Diehl RN  Safety Promotion/Fall Prevention:   activity supervised   assistive device/personal items within reach   clutter free environment maintained   fall prevention program maintained   nonskid shoes/slippers when out of bed   safety round/check completed  Taken 7/25/2021 0200 by Tresa Diehl RN  Safety Promotion/Fall Prevention:   activity supervised   assistive device/personal items within reach   fall prevention program maintained   nonskid shoes/slippers when out of bed   safety round/check completed  Taken 7/25/2021 0000 by Tresa Diehl RN  Safety Promotion/Fall Prevention:   activity supervised   assistive device/personal items within reach   clutter free environment maintained   fall prevention program maintained   nonskid shoes/slippers when out of bed   safety round/check completed  Taken 7/24/2021 2010 by Tresa Diehl RN  Safety Promotion/Fall Prevention:   activity supervised   assistive device/personal items within reach   clutter free environment maintained   fall prevention program maintained   nonskid shoes/slippers when out of bed   toileting scheduled   Goal Outcome Evaluation:  Plan of Care Reviewed With: patient        Progress: improving

## 2021-07-26 NOTE — CASE MANAGEMENT/SOCIAL WORK
Case Management Discharge Note      Final Note: Patient discharged over the weekend.  Patient was accepted by [facility] [phone]. SW spoke Eun at [facility] who explains they will see patient Monday or Tuesday. Plan is home with [facility] with family transporting.         Selected Continued Care - Discharged on 7/25/2021 Admission date: 7/22/2021 - Discharge disposition: Home or Self Care    Destination    No services have been selected for the patient.              Durable Medical Equipment    No services have been selected for the patient.              Dialysis/Infusion    No services have been selected for the patient.              Home Medical Care Coordination complete.    Service Provider Selected Services Address Phone Fax Patient Preferred    [facility] [address], [place] [id number] [phone] --          Therapy    No services have been selected for the patient.              Community Resources    No services have been selected for the patient.              Community & DME    No services have been selected for the patient.                       Final Discharge Disposition Code: 06 - home with home health care

## 2021-07-27 LAB
BACTERIA SPEC AEROBE CULT: NORMAL
BACTERIA SPEC AEROBE CULT: NORMAL

## 2022-01-01 ENCOUNTER — APPOINTMENT (OUTPATIENT)
Dept: CT IMAGING | Facility: HOSPITAL | Age: 82
End: 2022-01-01

## 2022-01-01 ENCOUNTER — ANESTHESIA EVENT (OUTPATIENT)
Dept: CARDIOLOGY | Facility: HOSPITAL | Age: 82
End: 2022-01-01

## 2022-01-01 ENCOUNTER — HOSPITAL ENCOUNTER (INPATIENT)
Facility: HOSPITAL | Age: 82
LOS: 2 days | End: 2022-11-02
Attending: EMERGENCY MEDICINE | Admitting: INTERNAL MEDICINE

## 2022-01-01 ENCOUNTER — APPOINTMENT (OUTPATIENT)
Dept: GENERAL RADIOLOGY | Facility: HOSPITAL | Age: 82
End: 2022-01-01

## 2022-01-01 ENCOUNTER — ANESTHESIA (OUTPATIENT)
Dept: CARDIOLOGY | Facility: HOSPITAL | Age: 82
End: 2022-01-01

## 2022-01-01 ENCOUNTER — HOSPITAL ENCOUNTER (INPATIENT)
Facility: HOSPITAL | Age: 82
LOS: 1 days | End: 2022-11-03
Attending: INTERNAL MEDICINE | Admitting: INTERNAL MEDICINE

## 2022-01-01 ENCOUNTER — APPOINTMENT (OUTPATIENT)
Dept: CARDIOLOGY | Facility: HOSPITAL | Age: 82
End: 2022-01-01

## 2022-01-01 VITALS
HEART RATE: 98 BPM | WEIGHT: 225 LBS | TEMPERATURE: 98.5 F | DIASTOLIC BLOOD PRESSURE: 65 MMHG | HEIGHT: 62 IN | SYSTOLIC BLOOD PRESSURE: 133 MMHG | OXYGEN SATURATION: 97 % | RESPIRATION RATE: 40 BRPM | BODY MASS INDEX: 41.41 KG/M2

## 2022-01-01 DIAGNOSIS — I63.9 CEREBROVASCULAR ACCIDENT (CVA), UNSPECIFIED MECHANISM: Primary | ICD-10-CM

## 2022-01-01 DIAGNOSIS — R53.1 LEFT-SIDED WEAKNESS: ICD-10-CM

## 2022-01-01 LAB
ALBUMIN SERPL-MCNC: 3.8 G/DL (ref 3.5–5.2)
ALBUMIN/GLOB SERPL: 1.2 G/DL
ALP SERPL-CCNC: 86 U/L (ref 39–117)
ALT SERPL W P-5'-P-CCNC: 16 U/L (ref 1–33)
ANION GAP SERPL CALCULATED.3IONS-SCNC: 8 MMOL/L (ref 5–15)
APTT PPP: 33.4 SECONDS (ref 60–90)
AST SERPL-CCNC: 18 U/L (ref 1–32)
BACTERIA UR QL AUTO: ABNORMAL /HPF
BASOPHILS # BLD AUTO: 0.02 10*3/MM3 (ref 0–0.2)
BASOPHILS # BLD AUTO: 0.03 10*3/MM3 (ref 0–0.2)
BASOPHILS NFR BLD AUTO: 0.4 % (ref 0–1.5)
BASOPHILS NFR BLD AUTO: 0.5 % (ref 0–1.5)
BH CV ECHO MEAS - AO MAX PG: 9.4 MMHG
BH CV ECHO MEAS - AO MEAN PG: 5 MMHG
BH CV ECHO MEAS - AO ROOT DIAM: 3.4 CM
BH CV ECHO MEAS - AO V2 MAX: 153 CM/SEC
BH CV ECHO MEAS - AO V2 VTI: 30.6 CM
BH CV ECHO MEAS - AVA(I,D): 1.95 CM2
BH CV ECHO MEAS - EDV(CUBED): 166.4 ML
BH CV ECHO MEAS - EDV(MOD-SP2): 195 ML
BH CV ECHO MEAS - EDV(MOD-SP4): 180 ML
BH CV ECHO MEAS - EF(MOD-BP): 32.4 %
BH CV ECHO MEAS - EF(MOD-SP2): 40 %
BH CV ECHO MEAS - EF(MOD-SP4): 30 %
BH CV ECHO MEAS - ESV(CUBED): 79.5 ML
BH CV ECHO MEAS - ESV(MOD-SP2): 117 ML
BH CV ECHO MEAS - ESV(MOD-SP4): 126 ML
BH CV ECHO MEAS - FS: 21.8 %
BH CV ECHO MEAS - IVS/LVPW: 0.88 CM
BH CV ECHO MEAS - IVSD: 1.4 CM
BH CV ECHO MEAS - LA DIMENSION: 4.3 CM
BH CV ECHO MEAS - LAT PEAK E' VEL: 13.3 CM/SEC
BH CV ECHO MEAS - LV MASS(C)D: 373.1 GRAMS
BH CV ECHO MEAS - LV MAX PG: 3.2 MMHG
BH CV ECHO MEAS - LV MEAN PG: 2 MMHG
BH CV ECHO MEAS - LV V1 MAX: 89.5 CM/SEC
BH CV ECHO MEAS - LV V1 VTI: 19 CM
BH CV ECHO MEAS - LVIDD: 5.5 CM
BH CV ECHO MEAS - LVIDS: 4.3 CM
BH CV ECHO MEAS - LVOT AREA: 3.1 CM2
BH CV ECHO MEAS - LVOT DIAM: 2 CM
BH CV ECHO MEAS - LVPWD: 1.6 CM
BH CV ECHO MEAS - MED PEAK E' VEL: 3.7 CM/SEC
BH CV ECHO MEAS - MV A MAX VEL: 143 CM/SEC
BH CV ECHO MEAS - MV DEC SLOPE: 353 CM/SEC2
BH CV ECHO MEAS - MV DEC TIME: 0.21 MSEC
BH CV ECHO MEAS - MV E MAX VEL: 59.6 CM/SEC
BH CV ECHO MEAS - MV E/A: 0.42
BH CV ECHO MEAS - MV MAX PG: 8.3 MMHG
BH CV ECHO MEAS - MV MEAN PG: 4 MMHG
BH CV ECHO MEAS - MV P1/2T: 54.8 MSEC
BH CV ECHO MEAS - MV V2 VTI: 29.9 CM
BH CV ECHO MEAS - MVA(P1/2T): 4 CM2
BH CV ECHO MEAS - MVA(VTI): 2 CM2
BH CV ECHO MEAS - PA ACC TIME: 0.12 SEC
BH CV ECHO MEAS - PA PR(ACCEL): 26.8 MMHG
BH CV ECHO MEAS - PA V2 MAX: 106 CM/SEC
BH CV ECHO MEAS - RAP SYSTOLE: 3 MMHG
BH CV ECHO MEAS - RVSP: 15 MMHG
BH CV ECHO MEAS - SV(LVOT): 59.7 ML
BH CV ECHO MEAS - SV(MOD-SP2): 78 ML
BH CV ECHO MEAS - SV(MOD-SP4): 54 ML
BH CV ECHO MEAS - TAPSE (>1.6): 1.37 CM
BH CV ECHO MEAS - TR MAX PG: 12.1 MMHG
BH CV ECHO MEAS - TR MAX VEL: 174.2 CM/SEC
BH CV ECHO MEASUREMENTS AVERAGE E/E' RATIO: 7.01
BH CV VAS BP LEFT ARM: NORMAL MMHG
BH CV XLRA - RV BASE: 4.1 CM
BH CV XLRA - RV LENGTH: 6.5 CM
BH CV XLRA - RV MID: 2.7 CM
BH CV XLRA - TDI S': 7.5 CM/SEC
BILIRUB SERPL-MCNC: 0.3 MG/DL (ref 0–1.2)
BILIRUB UR QL STRIP: NEGATIVE
BUN BLDA-MCNC: 24 MG/DL (ref 8–26)
BUN SERPL-MCNC: 18 MG/DL (ref 8–23)
BUN/CREAT SERPL: 19.4 (ref 7–25)
CA-I BLDA-SCNC: 1.24 MMOL/L (ref 1.2–1.32)
CALCIUM SPEC-SCNC: 8.6 MG/DL (ref 8.6–10.5)
CHLORIDE BLDA-SCNC: 99 MMOL/L (ref 98–109)
CHLORIDE SERPL-SCNC: 103 MMOL/L (ref 98–107)
CHOLEST SERPL-MCNC: 133 MG/DL (ref 0–200)
CLARITY UR: CLEAR
CO2 BLDA-SCNC: 32 MMOL/L (ref 24–29)
CO2 SERPL-SCNC: 28 MMOL/L (ref 22–29)
COLOR UR: YELLOW
CREAT BLDA-MCNC: 1 MG/DL (ref 0.6–1.3)
CREAT SERPL-MCNC: 0.93 MG/DL (ref 0.57–1)
DEPRECATED RDW RBC AUTO: 46.5 FL (ref 37–54)
DEPRECATED RDW RBC AUTO: 46.7 FL (ref 37–54)
EGFRCR SERPLBLD CKD-EPI 2021: 55.7 ML/MIN/1.73
EGFRCR SERPLBLD CKD-EPI 2021: 60.7 ML/MIN/1.73
EOSINOPHIL # BLD AUTO: 0.04 10*3/MM3 (ref 0–0.4)
EOSINOPHIL # BLD AUTO: 0.1 10*3/MM3 (ref 0–0.4)
EOSINOPHIL NFR BLD AUTO: 0.6 % (ref 0.3–6.2)
EOSINOPHIL NFR BLD AUTO: 1.9 % (ref 0.3–6.2)
ERYTHROCYTE [DISTWIDTH] IN BLOOD BY AUTOMATED COUNT: 13.2 % (ref 12.3–15.4)
ERYTHROCYTE [DISTWIDTH] IN BLOOD BY AUTOMATED COUNT: 13.3 % (ref 12.3–15.4)
GLOBULIN UR ELPH-MCNC: 3.2 GM/DL
GLUCOSE BLDC GLUCOMTR-MCNC: 181 MG/DL (ref 70–130)
GLUCOSE BLDC GLUCOMTR-MCNC: 182 MG/DL (ref 70–130)
GLUCOSE BLDC GLUCOMTR-MCNC: 182 MG/DL (ref 70–130)
GLUCOSE BLDC GLUCOMTR-MCNC: 186 MG/DL (ref 70–130)
GLUCOSE BLDC GLUCOMTR-MCNC: 193 MG/DL (ref 70–130)
GLUCOSE BLDC GLUCOMTR-MCNC: 205 MG/DL (ref 70–130)
GLUCOSE BLDC GLUCOMTR-MCNC: 205 MG/DL (ref 70–130)
GLUCOSE BLDC GLUCOMTR-MCNC: 216 MG/DL (ref 70–130)
GLUCOSE BLDC GLUCOMTR-MCNC: 235 MG/DL (ref 70–130)
GLUCOSE BLDC GLUCOMTR-MCNC: 311 MG/DL (ref 70–130)
GLUCOSE SERPL-MCNC: 308 MG/DL (ref 65–99)
GLUCOSE UR STRIP-MCNC: ABNORMAL MG/DL
HBA1C MFR BLD: 9.7 % (ref 4.8–5.6)
HCT VFR BLD AUTO: 44.1 % (ref 34–46.6)
HCT VFR BLD AUTO: 44.8 % (ref 34–46.6)
HCT VFR BLDA CALC: 43 % (ref 38–51)
HDLC SERPL-MCNC: 66 MG/DL (ref 40–60)
HGB BLD-MCNC: 13.5 G/DL (ref 12–15.9)
HGB BLD-MCNC: 13.8 G/DL (ref 12–15.9)
HGB BLDA-MCNC: 14.6 G/DL (ref 12–17)
HGB UR QL STRIP.AUTO: NEGATIVE
HOLD SPECIMEN: NORMAL
HYALINE CASTS UR QL AUTO: ABNORMAL /LPF
IMM GRANULOCYTES # BLD AUTO: 0.01 10*3/MM3 (ref 0–0.05)
IMM GRANULOCYTES # BLD AUTO: 0.02 10*3/MM3 (ref 0–0.05)
IMM GRANULOCYTES NFR BLD AUTO: 0.2 % (ref 0–0.5)
IMM GRANULOCYTES NFR BLD AUTO: 0.3 % (ref 0–0.5)
INR PPP: 0.92 (ref 0.84–1.13)
INR PPP: 1.1 (ref 0.8–1.2)
KETONES UR QL STRIP: NEGATIVE
LDLC SERPL CALC-MCNC: 51 MG/DL (ref 0–100)
LDLC/HDLC SERPL: 0.77 {RATIO}
LEFT ATRIUM VOLUME INDEX: 43.8 ML/M2
LEUKOCYTE ESTERASE UR QL STRIP.AUTO: NEGATIVE
LYMPHOCYTES # BLD AUTO: 1.2 10*3/MM3 (ref 0.7–3.1)
LYMPHOCYTES # BLD AUTO: 1.38 10*3/MM3 (ref 0.7–3.1)
LYMPHOCYTES NFR BLD AUTO: 19.1 % (ref 19.6–45.3)
LYMPHOCYTES NFR BLD AUTO: 26 % (ref 19.6–45.3)
MAXIMAL PREDICTED HEART RATE: 136 BPM
MCH RBC QN AUTO: 29.2 PG (ref 26.6–33)
MCH RBC QN AUTO: 29.2 PG (ref 26.6–33)
MCHC RBC AUTO-ENTMCNC: 30.6 G/DL (ref 31.5–35.7)
MCHC RBC AUTO-ENTMCNC: 30.8 G/DL (ref 31.5–35.7)
MCV RBC AUTO: 94.7 FL (ref 79–97)
MCV RBC AUTO: 95.2 FL (ref 79–97)
MONOCYTES # BLD AUTO: 0.35 10*3/MM3 (ref 0.1–0.9)
MONOCYTES # BLD AUTO: 0.4 10*3/MM3 (ref 0.1–0.9)
MONOCYTES NFR BLD AUTO: 5.6 % (ref 5–12)
MONOCYTES NFR BLD AUTO: 7.5 % (ref 5–12)
NEUTROPHILS NFR BLD AUTO: 3.4 10*3/MM3 (ref 1.7–7)
NEUTROPHILS NFR BLD AUTO: 4.63 10*3/MM3 (ref 1.7–7)
NEUTROPHILS NFR BLD AUTO: 64 % (ref 42.7–76)
NEUTROPHILS NFR BLD AUTO: 73.9 % (ref 42.7–76)
NITRITE UR QL STRIP: NEGATIVE
NRBC BLD AUTO-RTO: 0 /100 WBC (ref 0–0.2)
NRBC BLD AUTO-RTO: 0 /100 WBC (ref 0–0.2)
PH UR STRIP.AUTO: 7.5 [PH] (ref 5–8)
PLATELET # BLD AUTO: 125 10*3/MM3 (ref 140–450)
PLATELET # BLD AUTO: 132 10*3/MM3 (ref 140–450)
PMV BLD AUTO: 12.6 FL (ref 6–12)
PMV BLD AUTO: 12.7 FL (ref 6–12)
POTASSIUM BLDA-SCNC: 4.8 MMOL/L (ref 3.5–4.9)
POTASSIUM SERPL-SCNC: 4.4 MMOL/L (ref 3.5–5.2)
PROT SERPL-MCNC: 7 G/DL (ref 6–8.5)
PROT UR QL STRIP: ABNORMAL
PROTHROMBIN TIME: 12.2 SECONDS (ref 11.4–14.4)
PROTHROMBIN TIME: 12.6 SECONDS (ref 12.8–15.2)
QT INTERVAL: 320 MS
QTC INTERVAL: 477 MS
RBC # BLD AUTO: 4.63 10*6/MM3 (ref 3.77–5.28)
RBC # BLD AUTO: 4.73 10*6/MM3 (ref 3.77–5.28)
RBC # UR STRIP: ABNORMAL /HPF
REF LAB TEST METHOD: ABNORMAL
SODIUM BLD-SCNC: 139 MMOL/L (ref 138–146)
SODIUM SERPL-SCNC: 139 MMOL/L (ref 136–145)
SP GR UR STRIP: 1.03 (ref 1–1.03)
SQUAMOUS #/AREA URNS HPF: ABNORMAL /HPF
STRESS TARGET HR: 116 BPM
TRIGL SERPL-MCNC: 81 MG/DL (ref 0–150)
TROPONIN T SERPL-MCNC: 0.02 NG/ML (ref 0–0.03)
UFH PPP CHRO-ACNC: 0.1 IU/ML (ref 0.3–0.7)
UFH PPP CHRO-ACNC: 0.1 IU/ML (ref 0.3–0.7)
UROBILINOGEN UR QL STRIP: ABNORMAL
VLDLC SERPL-MCNC: 16 MG/DL (ref 5–40)
WBC # UR STRIP: ABNORMAL /HPF
WBC NRBC COR # BLD: 5.31 10*3/MM3 (ref 3.4–10.8)
WBC NRBC COR # BLD: 6.27 10*3/MM3 (ref 3.4–10.8)
WHOLE BLOOD HOLD COAG: NORMAL
WHOLE BLOOD HOLD SPECIMEN: NORMAL

## 2022-01-01 PROCEDURE — 85730 THROMBOPLASTIN TIME PARTIAL: CPT | Performed by: NURSE PRACTITIONER

## 2022-01-01 PROCEDURE — P9612 CATHETERIZE FOR URINE SPEC: HCPCS

## 2022-01-01 PROCEDURE — 85014 HEMATOCRIT: CPT

## 2022-01-01 PROCEDURE — 70450 CT HEAD/BRAIN W/O DYE: CPT

## 2022-01-01 PROCEDURE — 99233 SBSQ HOSP IP/OBS HIGH 50: CPT | Performed by: STUDENT IN AN ORGANIZED HEALTH CARE EDUCATION/TRAINING PROGRAM

## 2022-01-01 PROCEDURE — 80053 COMPREHEN METABOLIC PANEL: CPT | Performed by: EMERGENCY MEDICINE

## 2022-01-01 PROCEDURE — 25010000002 MORPHINE PER 10 MG: Performed by: INTERNAL MEDICINE

## 2022-01-01 PROCEDURE — 80047 BASIC METABLC PNL IONIZED CA: CPT

## 2022-01-01 PROCEDURE — 85520 HEPARIN ASSAY: CPT | Performed by: NURSE PRACTITIONER

## 2022-01-01 PROCEDURE — 25010000002 LORAZEPAM PER 2 MG: Performed by: INTERNAL MEDICINE

## 2022-01-01 PROCEDURE — 99223 1ST HOSP IP/OBS HIGH 75: CPT | Performed by: INTERNAL MEDICINE

## 2022-01-01 PROCEDURE — 25010000002 ONDANSETRON PER 1 MG: Performed by: NURSE PRACTITIONER

## 2022-01-01 PROCEDURE — 25010000002 LORAZEPAM PER 2 MG: Performed by: FAMILY MEDICINE

## 2022-01-01 PROCEDURE — 81001 URINALYSIS AUTO W/SCOPE: CPT | Performed by: EMERGENCY MEDICINE

## 2022-01-01 PROCEDURE — 85610 PROTHROMBIN TIME: CPT | Performed by: NURSE PRACTITIONER

## 2022-01-01 PROCEDURE — 85025 COMPLETE CBC W/AUTO DIFF WBC: CPT | Performed by: EMERGENCY MEDICINE

## 2022-01-01 PROCEDURE — 99232 SBSQ HOSP IP/OBS MODERATE 35: CPT | Performed by: INTERNAL MEDICINE

## 2022-01-01 PROCEDURE — 63710000001 INSULIN LISPRO (HUMAN) PER 5 UNITS: Performed by: INTERNAL MEDICINE

## 2022-01-01 PROCEDURE — 4A03X5D MEASUREMENT OF ARTERIAL FLOW, INTRACRANIAL, EXTERNAL APPROACH: ICD-10-PCS | Performed by: STUDENT IN AN ORGANIZED HEALTH CARE EDUCATION/TRAINING PROGRAM

## 2022-01-01 PROCEDURE — 85610 PROTHROMBIN TIME: CPT

## 2022-01-01 PROCEDURE — 99223 1ST HOSP IP/OBS HIGH 75: CPT | Performed by: STUDENT IN AN ORGANIZED HEALTH CARE EDUCATION/TRAINING PROGRAM

## 2022-01-01 PROCEDURE — 82962 GLUCOSE BLOOD TEST: CPT

## 2022-01-01 PROCEDURE — 25010000002 MORPHINE PER 10 MG: Performed by: FAMILY MEDICINE

## 2022-01-01 PROCEDURE — 70498 CT ANGIOGRAPHY NECK: CPT

## 2022-01-01 PROCEDURE — 0042T HC CT CEREBRAL PERFUSION W/WO CONTRAST: CPT

## 2022-01-01 PROCEDURE — 99239 HOSP IP/OBS DSCHRG MGMT >30: CPT | Performed by: NURSE PRACTITIONER

## 2022-01-01 PROCEDURE — 85520 HEPARIN ASSAY: CPT

## 2022-01-01 PROCEDURE — 93306 TTE W/DOPPLER COMPLETE: CPT

## 2022-01-01 PROCEDURE — 84484 ASSAY OF TROPONIN QUANT: CPT | Performed by: EMERGENCY MEDICINE

## 2022-01-01 PROCEDURE — 0 IOPAMIDOL PER 1 ML: Performed by: EMERGENCY MEDICINE

## 2022-01-01 PROCEDURE — 85025 COMPLETE CBC W/AUTO DIFF WBC: CPT | Performed by: NURSE PRACTITIONER

## 2022-01-01 PROCEDURE — 93306 TTE W/DOPPLER COMPLETE: CPT | Performed by: INTERNAL MEDICINE

## 2022-01-01 PROCEDURE — 99285 EMERGENCY DEPT VISIT HI MDM: CPT

## 2022-01-01 PROCEDURE — 70496 CT ANGIOGRAPHY HEAD: CPT

## 2022-01-01 PROCEDURE — 25010000002 HEPARIN (PORCINE) 25000-0.45 UT/250ML-% SOLUTION: Performed by: NURSE PRACTITIONER

## 2022-01-01 PROCEDURE — 71045 X-RAY EXAM CHEST 1 VIEW: CPT

## 2022-01-01 PROCEDURE — 83036 HEMOGLOBIN GLYCOSYLATED A1C: CPT | Performed by: NURSE PRACTITIONER

## 2022-01-01 PROCEDURE — 25010000002 KETOROLAC TROMETHAMINE PER 15 MG: Performed by: INTERNAL MEDICINE

## 2022-01-01 PROCEDURE — 93005 ELECTROCARDIOGRAM TRACING: CPT | Performed by: EMERGENCY MEDICINE

## 2022-01-01 PROCEDURE — 80061 LIPID PANEL: CPT | Performed by: NURSE PRACTITIONER

## 2022-01-01 PROCEDURE — 36415 COLL VENOUS BLD VENIPUNCTURE: CPT

## 2022-01-01 RX ORDER — GLYCOPYRROLATE 0.2 MG/ML
0.4 INJECTION INTRAMUSCULAR; INTRAVENOUS EVERY 4 HOURS
Status: DISCONTINUED | OUTPATIENT
Start: 2022-01-01 | End: 2022-01-01

## 2022-01-01 RX ORDER — DEXTROSE MONOHYDRATE 25 G/50ML
25 INJECTION, SOLUTION INTRAVENOUS
Status: DISCONTINUED | OUTPATIENT
Start: 2022-01-01 | End: 2022-01-01 | Stop reason: HOSPADM

## 2022-01-01 RX ORDER — SODIUM CHLORIDE 0.9 % (FLUSH) 0.9 %
10 SYRINGE (ML) INJECTION EVERY 12 HOURS SCHEDULED
Status: CANCELLED | OUTPATIENT
Start: 2022-01-01

## 2022-01-01 RX ORDER — SODIUM CHLORIDE 0.9 % (FLUSH) 0.9 %
10 SYRINGE (ML) INJECTION AS NEEDED
Status: CANCELLED | OUTPATIENT
Start: 2022-01-01

## 2022-01-01 RX ORDER — ASPIRIN 300 MG/1
300 SUPPOSITORY RECTAL DAILY
Status: CANCELLED | OUTPATIENT
Start: 2022-11-03

## 2022-01-01 RX ORDER — NICOTINE POLACRILEX 4 MG
15 LOZENGE BUCCAL
Status: CANCELLED | OUTPATIENT
Start: 2022-01-01

## 2022-01-01 RX ORDER — ACETAMINOPHEN 650 MG/1
650 SUPPOSITORY RECTAL EVERY 4 HOURS PRN
Status: DISCONTINUED | OUTPATIENT
Start: 2022-01-01 | End: 2022-11-03 | Stop reason: HOSPADM

## 2022-01-01 RX ORDER — ATORVASTATIN CALCIUM 40 MG/1
80 TABLET, FILM COATED ORAL NIGHTLY
Status: CANCELLED | OUTPATIENT
Start: 2022-01-01

## 2022-01-01 RX ORDER — CLOPIDOGREL BISULFATE 75 MG/1
300 TABLET ORAL ONCE
Status: DISCONTINUED | OUTPATIENT
Start: 2022-01-01 | End: 2022-01-01 | Stop reason: HOSPADM

## 2022-01-01 RX ORDER — ASPIRIN 300 MG/1
300 SUPPOSITORY RECTAL ONCE
Status: DISCONTINUED | OUTPATIENT
Start: 2022-01-01 | End: 2022-01-01

## 2022-01-01 RX ORDER — BISACODYL 10 MG
10 SUPPOSITORY, RECTAL RECTAL DAILY PRN
Status: DISCONTINUED | OUTPATIENT
Start: 2022-01-01 | End: 2022-11-03 | Stop reason: HOSPADM

## 2022-01-01 RX ORDER — METOPROLOL TARTRATE 5 MG/5ML
2.5 INJECTION INTRAVENOUS EVERY 6 HOURS PRN
Status: CANCELLED | OUTPATIENT
Start: 2022-01-01

## 2022-01-01 RX ORDER — MORPHINE SULFATE 2 MG/ML
2 INJECTION, SOLUTION INTRAMUSCULAR; INTRAVENOUS EVERY 6 HOURS PRN
Status: DISCONTINUED | OUTPATIENT
Start: 2022-01-01 | End: 2022-01-01

## 2022-01-01 RX ORDER — GLYCOPYRROLATE 0.2 MG/ML
0.4 INJECTION INTRAMUSCULAR; INTRAVENOUS EVERY 4 HOURS PRN
Status: DISCONTINUED | OUTPATIENT
Start: 2022-01-01 | End: 2022-01-01 | Stop reason: HOSPADM

## 2022-01-01 RX ORDER — MORPHINE SULFATE 2 MG/ML
2 INJECTION, SOLUTION INTRAMUSCULAR; INTRAVENOUS EVERY 4 HOURS PRN
Status: DISCONTINUED | OUTPATIENT
Start: 2022-01-01 | End: 2022-01-01 | Stop reason: SDUPTHER

## 2022-01-01 RX ORDER — ATORVASTATIN CALCIUM 40 MG/1
80 TABLET, FILM COATED ORAL NIGHTLY
Status: DISCONTINUED | OUTPATIENT
Start: 2022-01-01 | End: 2022-01-01 | Stop reason: HOSPADM

## 2022-01-01 RX ORDER — NICOTINE POLACRILEX 4 MG
15 LOZENGE BUCCAL
Status: DISCONTINUED | OUTPATIENT
Start: 2022-01-01 | End: 2022-01-01 | Stop reason: HOSPADM

## 2022-01-01 RX ORDER — DEXTROSE MONOHYDRATE 25 G/50ML
25 INJECTION, SOLUTION INTRAVENOUS
Status: CANCELLED | OUTPATIENT
Start: 2022-01-01

## 2022-01-01 RX ORDER — SODIUM CHLORIDE 0.9 % (FLUSH) 0.9 %
10 SYRINGE (ML) INJECTION AS NEEDED
Status: DISCONTINUED | OUTPATIENT
Start: 2022-01-01 | End: 2022-01-01 | Stop reason: HOSPADM

## 2022-01-01 RX ORDER — GLYCOPYRROLATE 0.2 MG/ML
0.4 INJECTION INTRAMUSCULAR; INTRAVENOUS EVERY 4 HOURS PRN
Status: CANCELLED | OUTPATIENT
Start: 2022-01-01

## 2022-01-01 RX ORDER — GLYCOPYRROLATE 0.2 MG/ML
0.2 INJECTION INTRAMUSCULAR; INTRAVENOUS EVERY 4 HOURS PRN
Status: DISCONTINUED | OUTPATIENT
Start: 2022-01-01 | End: 2022-11-03 | Stop reason: HOSPADM

## 2022-01-01 RX ORDER — INSULIN LISPRO 100 [IU]/ML
0-7 INJECTION, SOLUTION INTRAVENOUS; SUBCUTANEOUS
Status: DISCONTINUED | OUTPATIENT
Start: 2022-01-01 | End: 2022-01-01

## 2022-01-01 RX ORDER — ASPIRIN 325 MG
325 TABLET ORAL ONCE
Status: DISCONTINUED | OUTPATIENT
Start: 2022-01-01 | End: 2022-01-01

## 2022-01-01 RX ORDER — SCOLOPAMINE TRANSDERMAL SYSTEM 1 MG/1
1 PATCH, EXTENDED RELEASE TRANSDERMAL
Status: DISCONTINUED | OUTPATIENT
Start: 2022-01-01 | End: 2022-11-03 | Stop reason: HOSPADM

## 2022-01-01 RX ORDER — CLOPIDOGREL BISULFATE 75 MG/1
75 TABLET ORAL DAILY
Status: CANCELLED | OUTPATIENT
Start: 2022-11-03

## 2022-01-01 RX ORDER — LORAZEPAM 2 MG/ML
0.5 INJECTION INTRAMUSCULAR EVERY 4 HOURS PRN
Status: CANCELLED | OUTPATIENT
Start: 2022-01-01 | End: 2022-11-08

## 2022-01-01 RX ORDER — CLOPIDOGREL BISULFATE 75 MG/1
75 TABLET ORAL DAILY
Status: DISCONTINUED | OUTPATIENT
Start: 2022-01-01 | End: 2022-01-01 | Stop reason: HOSPADM

## 2022-01-01 RX ORDER — MORPHINE SULFATE 2 MG/ML
2 INJECTION, SOLUTION INTRAMUSCULAR; INTRAVENOUS
Status: DISCONTINUED | OUTPATIENT
Start: 2022-01-01 | End: 2022-01-01 | Stop reason: HOSPADM

## 2022-01-01 RX ORDER — FUROSEMIDE 10 MG/ML
20 INJECTION INTRAMUSCULAR; INTRAVENOUS EVERY 6 HOURS PRN
Status: DISCONTINUED | OUTPATIENT
Start: 2022-01-01 | End: 2022-11-03 | Stop reason: HOSPADM

## 2022-01-01 RX ORDER — SODIUM CHLORIDE 0.9 % (FLUSH) 0.9 %
10 SYRINGE (ML) INJECTION EVERY 12 HOURS SCHEDULED
Status: DISCONTINUED | OUTPATIENT
Start: 2022-01-01 | End: 2022-01-01 | Stop reason: HOSPADM

## 2022-01-01 RX ORDER — INSULIN LISPRO 100 [IU]/ML
0-7 INJECTION, SOLUTION INTRAVENOUS; SUBCUTANEOUS EVERY 6 HOURS
Status: CANCELLED | OUTPATIENT
Start: 2022-01-01

## 2022-01-01 RX ORDER — LORAZEPAM 2 MG/ML
0.5 INJECTION INTRAMUSCULAR EVERY 4 HOURS PRN
Status: DISCONTINUED | OUTPATIENT
Start: 2022-01-01 | End: 2022-01-01 | Stop reason: HOSPADM

## 2022-01-01 RX ORDER — MORPHINE SULFATE 2 MG/ML
2 INJECTION, SOLUTION INTRAMUSCULAR; INTRAVENOUS EVERY 6 HOURS
Status: DISCONTINUED | OUTPATIENT
Start: 2022-01-01 | End: 2022-11-03 | Stop reason: HOSPADM

## 2022-01-01 RX ORDER — ONDANSETRON 2 MG/ML
4 INJECTION INTRAMUSCULAR; INTRAVENOUS EVERY 6 HOURS PRN
Status: DISCONTINUED | OUTPATIENT
Start: 2022-01-01 | End: 2022-01-01 | Stop reason: HOSPADM

## 2022-01-01 RX ORDER — KETOROLAC TROMETHAMINE 15 MG/ML
15 INJECTION, SOLUTION INTRAMUSCULAR; INTRAVENOUS EVERY 6 HOURS PRN
Status: DISCONTINUED | OUTPATIENT
Start: 2022-01-01 | End: 2022-11-03 | Stop reason: HOSPADM

## 2022-01-01 RX ORDER — ASPIRIN 81 MG/1
81 TABLET, CHEWABLE ORAL DAILY
Status: DISCONTINUED | OUTPATIENT
Start: 2022-01-01 | End: 2022-01-01

## 2022-01-01 RX ORDER — ASPIRIN 81 MG/1
81 TABLET, CHEWABLE ORAL DAILY
Status: CANCELLED | OUTPATIENT
Start: 2022-11-03

## 2022-01-01 RX ORDER — LORAZEPAM 2 MG/ML
0.5 INJECTION INTRAMUSCULAR EVERY 4 HOURS PRN
Status: DISCONTINUED | OUTPATIENT
Start: 2022-01-01 | End: 2022-11-03 | Stop reason: HOSPADM

## 2022-01-01 RX ORDER — ASPIRIN 300 MG/1
300 SUPPOSITORY RECTAL ONCE
Status: COMPLETED | OUTPATIENT
Start: 2022-01-01 | End: 2022-01-01

## 2022-01-01 RX ORDER — INSULIN LISPRO 100 [IU]/ML
0-7 INJECTION, SOLUTION INTRAVENOUS; SUBCUTANEOUS EVERY 6 HOURS
Status: DISCONTINUED | OUTPATIENT
Start: 2022-01-01 | End: 2022-01-01 | Stop reason: HOSPADM

## 2022-01-01 RX ORDER — ASPIRIN 300 MG/1
300 SUPPOSITORY RECTAL DAILY
Status: DISCONTINUED | OUTPATIENT
Start: 2022-01-01 | End: 2022-01-01 | Stop reason: HOSPADM

## 2022-01-01 RX ORDER — HEPARIN SODIUM 10000 [USP'U]/100ML
10 INJECTION, SOLUTION INTRAVENOUS
Status: DISPENSED | OUTPATIENT
Start: 2022-01-01 | End: 2022-01-01

## 2022-01-01 RX ORDER — MORPHINE SULFATE 2 MG/ML
2 INJECTION, SOLUTION INTRAMUSCULAR; INTRAVENOUS
Status: DISCONTINUED | OUTPATIENT
Start: 2022-01-01 | End: 2022-11-03 | Stop reason: HOSPADM

## 2022-01-01 RX ORDER — METOPROLOL TARTRATE 5 MG/5ML
2.5 INJECTION INTRAVENOUS EVERY 6 HOURS PRN
Status: DISCONTINUED | OUTPATIENT
Start: 2022-01-01 | End: 2022-01-01 | Stop reason: HOSPADM

## 2022-01-01 RX ORDER — ASPIRIN 81 MG/1
81 TABLET, CHEWABLE ORAL DAILY
Status: DISCONTINUED | OUTPATIENT
Start: 2022-01-01 | End: 2022-01-01 | Stop reason: HOSPADM

## 2022-01-01 RX ORDER — ONDANSETRON 2 MG/ML
4 INJECTION INTRAMUSCULAR; INTRAVENOUS EVERY 6 HOURS PRN
Status: CANCELLED | OUTPATIENT
Start: 2022-01-01

## 2022-01-01 RX ORDER — ASPIRIN 300 MG/1
300 SUPPOSITORY RECTAL DAILY
Status: DISCONTINUED | OUTPATIENT
Start: 2022-01-01 | End: 2022-01-01

## 2022-01-01 RX ORDER — KETOROLAC TROMETHAMINE 15 MG/ML
15 INJECTION, SOLUTION INTRAMUSCULAR; INTRAVENOUS ONCE AS NEEDED
Status: COMPLETED | OUTPATIENT
Start: 2022-01-01 | End: 2022-01-01

## 2022-01-01 RX ORDER — HALOPERIDOL 5 MG/ML
1 INJECTION INTRAMUSCULAR EVERY 6 HOURS PRN
Status: DISCONTINUED | OUTPATIENT
Start: 2022-01-01 | End: 2022-11-03 | Stop reason: HOSPADM

## 2022-01-01 RX ORDER — POLYVINYL ALCOHOL 14 MG/ML
1 SOLUTION/ DROPS OPHTHALMIC
Status: DISCONTINUED | OUTPATIENT
Start: 2022-01-01 | End: 2022-11-03 | Stop reason: HOSPADM

## 2022-01-01 RX ORDER — MORPHINE SULFATE 2 MG/ML
2 INJECTION, SOLUTION INTRAMUSCULAR; INTRAVENOUS
Status: CANCELLED | OUTPATIENT
Start: 2022-01-01 | End: 2022-11-08

## 2022-01-01 RX ADMIN — INSULIN LISPRO 2 UNITS: 100 INJECTION, SOLUTION INTRAVENOUS; SUBCUTANEOUS at 17:44

## 2022-01-01 RX ADMIN — SCOPALAMINE 1 PATCH: 1 PATCH, EXTENDED RELEASE TRANSDERMAL at 14:00

## 2022-01-01 RX ADMIN — GLYCOPYRROLATE 0.4 MG: 0.2 INJECTION INTRAMUSCULAR; INTRAVENOUS at 18:26

## 2022-01-01 RX ADMIN — HEPARIN SODIUM 10 UNITS/KG/HR: 10000 INJECTION, SOLUTION INTRAVENOUS at 17:58

## 2022-01-01 RX ADMIN — MORPHINE SULFATE 2 MG: 2 INJECTION, SOLUTION INTRAMUSCULAR; INTRAVENOUS at 10:21

## 2022-01-01 RX ADMIN — LORAZEPAM 0.5 MG: 2 INJECTION INTRAMUSCULAR; INTRAVENOUS at 18:22

## 2022-01-01 RX ADMIN — MORPHINE SULFATE 2 MG: 2 INJECTION, SOLUTION INTRAMUSCULAR; INTRAVENOUS at 12:50

## 2022-01-01 RX ADMIN — GLYCOPYRROLATE 0.4 MG: 0.2 INJECTION INTRAMUSCULAR; INTRAVENOUS at 22:21

## 2022-01-01 RX ADMIN — MINERAL OIL: 1000 LIQUID ORAL at 09:02

## 2022-01-01 RX ADMIN — SODIUM CHLORIDE 500 ML: 9 INJECTION, SOLUTION INTRAVENOUS at 02:39

## 2022-01-01 RX ADMIN — INSULIN LISPRO 3 UNITS: 100 INJECTION, SOLUTION INTRAVENOUS; SUBCUTANEOUS at 01:37

## 2022-01-01 RX ADMIN — MORPHINE SULFATE 2 MG: 2 INJECTION, SOLUTION INTRAMUSCULAR; INTRAVENOUS at 20:14

## 2022-01-01 RX ADMIN — MORPHINE SULFATE 2 MG: 2 INJECTION, SOLUTION INTRAMUSCULAR; INTRAVENOUS at 22:12

## 2022-01-01 RX ADMIN — Medication 10 ML: at 09:02

## 2022-01-01 RX ADMIN — MORPHINE SULFATE 4 MG: 4 INJECTION, SOLUTION INTRAMUSCULAR; INTRAVENOUS at 22:39

## 2022-01-01 RX ADMIN — MORPHINE SULFATE 2 MG: 2 INJECTION, SOLUTION INTRAMUSCULAR; INTRAVENOUS at 14:21

## 2022-01-01 RX ADMIN — Medication 10 ML: at 22:12

## 2022-01-01 RX ADMIN — MORPHINE SULFATE 2 MG: 2 INJECTION, SOLUTION INTRAMUSCULAR; INTRAVENOUS at 18:02

## 2022-01-01 RX ADMIN — KETOROLAC TROMETHAMINE 15 MG: 15 INJECTION, SOLUTION INTRAMUSCULAR; INTRAVENOUS at 22:39

## 2022-01-01 RX ADMIN — Medication 10 ML: at 09:01

## 2022-01-01 RX ADMIN — INSULIN LISPRO 3 UNITS: 100 INJECTION, SOLUTION INTRAVENOUS; SUBCUTANEOUS at 13:04

## 2022-01-01 RX ADMIN — LORAZEPAM 0.5 MG: 2 INJECTION INTRAMUSCULAR; INTRAVENOUS at 22:39

## 2022-01-01 RX ADMIN — INSULIN LISPRO 2 UNITS: 100 INJECTION, SOLUTION INTRAVENOUS; SUBCUTANEOUS at 06:38

## 2022-01-01 RX ADMIN — MORPHINE SULFATE 2 MG: 2 INJECTION, SOLUTION INTRAMUSCULAR; INTRAVENOUS at 18:01

## 2022-01-01 RX ADMIN — ONDANSETRON 4 MG: 2 INJECTION INTRAMUSCULAR; INTRAVENOUS at 22:39

## 2022-01-01 RX ADMIN — IOPAMIDOL 115 ML: 755 INJECTION, SOLUTION INTRAVENOUS at 14:05

## 2022-01-01 RX ADMIN — ASPIRIN 300 MG: 300 SUPPOSITORY RECTAL at 04:11

## 2022-10-31 PROBLEM — I63.9 CEREBROVASCULAR ACCIDENT (CVA), UNSPECIFIED MECHANISM: Status: ACTIVE | Noted: 2022-01-01

## 2022-11-01 NOTE — CONSULTS
Provider, No Known  Consulting physician: Ronaldo    Chief Complaint   Patient presents with   • Stroke       Reason for consult: [facility]    HPI: Patient is an 82-year-old female who is previously living at home, however she did have caregivers with family stating that at most she can only take about 5-10 steps but even this got her extremely dyspneic and fatigued.  Patient brought hospital [facility] status ultimately found to have a stroke and unfortunately has continued to have evolution of the stroke.  Today the patient has been noncommunicative with nursing staff states that she is only had some garbled speech intermittently.      Pain assesment: No visible signs of pain    Dyspnea: Positive    N/V: No visible signs of nausea    PPS: 20      Past Medical History:   Diagnosis Date   • Diabetes mellitus (HCC)    • Elevated cholesterol    • Hypertension      History reviewed. No pertinent surgical history.  Current Code Status     Date Active Code Status Order ID Comments User Context       11/1/2022 1313 No CPR (Do Not Attempt to Resuscitate) [id number]  Barry Helms, DO Inpatient      Question Answer    Code Status (Patient has no pulse and is not breathing) No CPR (Do Not Attempt to Resuscitate)    Medical Interventions (Patient has pulse or is breathing) Limited Support    Medical Intervention Limits: NO cardioversion     NO intubation (DNI)     NO dialysis     NO vasopressors     NO blood products     NO artificial nutrition    Comments no bipap, no icu              Current Facility-Administered Medications   Medication Dose Route Frequency Provider Last Rate Last Admin   • [START ON 11/2/2022] aspirin chewable tablet 81 mg  81 mg Oral Daily Concetta Higgins APRN        Or   • [START ON 11/2/2022] aspirin suppository 300 mg  300 mg Rectal Daily Concetta Higgins APRN       • atorvastatin (LIPITOR) tablet 80 mg  80 mg Oral Nightly Vielka Mcclain APRN       • clopidogrel (PLAVIX) tablet 300 mg  300 mg Oral  Once Vielka Mcclain APRN        And   • clopidogrel (PLAVIX) tablet 75 mg  75 mg Oral Daily Vielka Mcclain APRN       • clopidogrel (PLAVIX) tablet 300 mg  300 mg Oral Once Concetta Higgins APRN       • dextrose (D50W) (25 g/50 mL) IV injection 25 g  25 g Intravenous Q15 Min PRN Christel Dubois MD       • dextrose (GLUTOSE) oral gel 15 g  15 g Oral Q15 Min PRN Christel Dubois MD       • glucagon (human recombinant) (GLUCAGEN DIAGNOSTIC) injection 1 mg  1 mg Intramuscular Q15 Min PRN Christel Dubois MD       • Insulin Lispro (humaLOG) injection 0-7 Units  0-7 Units Subcutaneous Q6H Joao Pena MD       • metoprolol tartrate (LOPRESSOR) injection 2.5 mg  2.5 mg Intravenous Q6H PRN Christel Dubois MD       • morphine injection 2 mg  2 mg Intravenous Q4H PRN Christel Dubois MD       • ondansetron (ZOFRAN) injection 4 mg  4 mg Intravenous Q6H PRN Nita Espinal APRN   4 mg at 10/31/22 2239   • palliative care oral rinse   Mouth/Throat PRN Joao Pena MD       • sodium chloride 0.9 % flush 10 mL  10 mL Intravenous PRN Milad Mulligan DO       • sodium chloride 0.9 % flush 10 mL  10 mL Intravenous PRN Milad Mulligan, DO       • sodium chloride 0.9 % flush 10 mL  10 mL Intravenous Q12H Vielka Mcclain APRN   10 mL at 11/01/22 0901   • sodium chloride 0.9 % flush 10 mL  10 mL Intravenous PRN Vielka Mcclain APRN            •  dextrose  •  dextrose  •  glucagon (human recombinant)  •  metoprolol tartrate  •  Morphine  •  ondansetron  •  palliative care oral rinse  •  sodium chloride  •  sodium chloride  •  sodium chloride  No Known Allergies  History reviewed. No pertinent family history.  Social History     Socioeconomic History   • Marital status:    Tobacco Use   • Smoking status: Never   Vaping Use   • Vaping Use: Never used   Substance and Sexual Activity   • Alcohol use: Never   • Drug use: Never   • Sexual activity: Defer     Review of Systems -all others reviewed and found  "negative except mentioned in the HPI      /82   Pulse 89   Temp 97.2 °F (36.2 °C) (Axillary)   Resp 22   Ht 157.5 cm (62\")   Wt 102 kg (225 lb)   SpO2 96%   BMI 41.15 kg/m²     Intake/Output Summary (Last 24 hours) at 11/1/2022 1335  Last data filed at 11/1/2022 0605  Gross per 24 hour   Intake --   Output 75 ml   Net -75 ml     Physical Exam:      General Appearance:   Noncommunicative, ill-appearing, dyspneic appearing   Head:    Normocephalic, without obvious abnormality, atraumatic   Eyes:            Lids and lashes normal, conjunctivae and sclerae normal, no   icterus, no pallor, corneas clear, PERRLA   Ears:    Ears appear intact with no abnormalities noted   Throat:   No oral lesions, no thrush, oral mucosa moist   Neck:   No adenopathy, supple, trachea midline, no thyromegaly, no     carotid bruit, no JVD   Back:     No kyphosis present, no scoliosis present, no skin lesions,       erythema or scars, no tenderness to percussion or                   palpation,   range of motion normal   Lungs:    Decreased breath sounds throughout    Heart:    Regular rhythm and normal rate, normal S1 and S2, no            murmur, no gallop, no rub, no click   Breast Exam:    Deferred   Abdomen:     Normal bowel sounds, no masses, no organomegaly, soft        non-tender, non-distended, no guarding, no rebound                 tenderness   Genitalia:    Deferred   Extremities:   Moves all extremities well, no edema, no cyanosis, no              redness   Pulses:   Pulses palpable and equal bilaterally   Skin:   No bleeding, bruising or rash   Lymph nodes:   No palpable adenopathy           Results from last 7 days   Lab Units 11/01/22  0310   WBC 10*3/mm3 6.27   HEMOGLOBIN g/dL 13.8   HEMATOCRIT % 44.8   PLATELETS 10*3/mm3 125*     Results from last 7 days   Lab Units 10/31/22  1350 10/31/22  1349   SODIUM mmol/L  --  139   POTASSIUM mmol/L  --  4.4   CHLORIDE mmol/L  --  103   CO2 mmol/L  --  28.0   BUN mg/dL  --  " 18   CREATININE mg/dL 1.00 0.93   CALCIUM mg/dL  --  8.6   BILIRUBIN mg/dL  --  0.3   ALK PHOS U/L  --  86   ALT (SGPT) U/L  --  16   AST (SGOT) U/L  --  18   GLUCOSE mg/dL  --  308*     Results from last 7 days   Lab Units 10/31/22  1350 10/31/22  1349   SODIUM mmol/L  --  139   POTASSIUM mmol/L  --  4.4   CHLORIDE mmol/L  --  103   CO2 mmol/L  --  28.0   BUN mg/dL  --  18   CREATININE mg/dL 1.00 0.93   GLUCOSE mg/dL  --  308*   CALCIUM mg/dL  --  8.6     Imaging Results (Last 72 Hours)     Procedure Component Value Units Date/Time    CT Head Without Contrast Stroke Protocol [[id number]] Collected: 11/01/22 0145     Updated: 11/01/22 0206    Narrative:      EXAMINATION: CT HEAD WITHOUT CONTRAST    DATE: 10/31/2022 11:32 PM     INDICATION: Stroke, follow up Code stroke, left side flaccid, facial droop, dysarthria, scan time: 0134     COMPARISON: CT head 10/31/2022.     TECHNIQUE:  Routine axial images through the head without contrast. Coronal reformations.    Low-dose CT acquisition technique included one or more of the following options: 1. Automated exposure control, 2. Adjustment of mA and/or KV according to patient's size and/or 3. Use of iterative reconstruction.    FINDINGS:      Intracranial contents:    Hypodensity in the right temporal lobe, right insula extending to the right parietal region measures approximately 3.3 x 7.9 cm.    Hyperdense right middle cerebral artery from thrombus.    Ventricular and cisternal spaces are prominent from volume loss. Severe periventricular and subcortical white matter hypodensities. Hypodensity/encephalomalacia left occipital lobe measures 1.5 x 2.3 cm. No dominant mass, midline shift, hydrocephalus,   extra axial fluid collection or acute hemorrhage.    No asymmetric hyperdensity of the proximal major cerebral arteries.    Craniocervical junction is patent.    Bones and extracranial soft tissues:    Mild mucosal thickening ethmoid air cells, maxillary sinuses. Otherwise,  Visualized paranasal sinuses and mastoid air cells are clear.  Skull is intact. Cataract surgery on the right. Degenerative changes temporomandibular joints. Calcifications distal   internal carotid arteries and distal vertebral arteries.      Impression:      1. Acute infarct in the right middle cerebral artery territory involving the right temporal lobe, right insula extending to the right parietal region.  2. Thrombus in the right middle cerebral artery.  3. Severe changes small vessel ischemic disease of indeterminate age, presumably mostly chronic. Volume loss. Atherosclerosis. To chronic infarct left occipital lobe.      Report called to ARNAUD DILL at 11/1/2022 1:45 AM    Electronically signed by:  Isaiah Nuñez M.D.    11/1/2022 12:05 AM Mountain Time    XR Chest 1 View [[id number]] Collected: 10/31/22 1655     Updated: 10/31/22 1700    Narrative:      DATE OF EXAM: 10/31/2022 4:22 PM     PROCEDURE: XR CHEST 1 VW-     INDICATIONS: Stroke Protocol (Onset > 12 Hrs); I63.9-Cerebral  infarction, unspecified; R53.1-Weakness     COMPARISON: July 22, 2021     TECHNIQUE: Single radiographic AP view of the chest was obtained.     FINDINGS:  Sternotomy wires are noted. The heart looks enlarged. There are  prominent markings right upper lobe. Whether this could be secondary to  acute infiltrate reflect more of a confluence of shadows is uncertain.  There are no pleural effusions. There is slight prominence of central  pulmonary vasculature that could reflect some vascular congestion.        Impression:      1.  Cardiomegaly.  2.  Prominence of markings right upper lobe. An inflammatory infectious  process could not be excluded.  3.  There probably is some underlying pulmonary vascular congestion.     This report was finalized on 10/31/2022 4:57 PM by Rai Rivera MD.       CT Angiogram Head w AI Analysis of LVO [[id number]] Collected: 10/31/22 1434     Updated: 10/31/22 8923    Narrative:      DATE OF EXAM:  10/31/2022 1:58 PM     PROCEDURE: CT CEREBRAL PERFUSION W WO CONTRAST-, CT ANGIOGRAM NECK-, CT  ANGIOGRAM HEAD W AI ANALYSIS OF LVO-     INDICATIONS: Neuro deficit, acute, stroke suspected     COMPARISON: Concurrent noncontrast CT the head     TECHNIQUE: Routine transaxial cuts were obtained through the head  without administration of contrast. Routine transaxial cuts were then  obtained through the head following the intravenous administration of  115 mL of Isovue 370. Core blood volume, core blood flow, mean transit  time, and Tmax images were obtained utilizing the Rapid software  protocol. A limited CT angiogram of the head was also performed to  measure the blood vessel density.     CTA of the head and CTA of the neck was performed after the intravenous  administration of above contrast. Reconstructed coronal and sagittal  images were also obtained. In addition, a 3-D volume rendered image was  obtained after post processing. Automated exposure control and iterative  reconstruction methods were used. AI analysis of LVO was utilized for  the CTA Head imaging portion of the study.      The radiation dose reduction device was turned on for each scan per the  ALARA (As Low as Reasonably Achievable) protocol.     Stenosis measurement was performed by the NASCET or similar method.        FINDINGS:   CT cerebral perfusion:  There is a large perfusion abnormality in the right MCA territory with 6  mL of calculated core infarct and 97 mL of ischemic penumbra.     CTA HEAD:  There is abrupt cut off/large vessel occlusion of the distal right M1  segment (series 7 image 307). No additional large vessel occlusion or  flow-limiting stenosis is identified. There are scattered luminal  irregularities of the anterior and posterior intracranial circulation  likely on the basis of atherosclerosis. There is atherosclerosis of the  bilateral carotid siphons and bilateral V4 segments. No evidence of  aneurysm or dissection. The  cerebral veins and dural venous sinuses are  patent.     CTA NECK:  Two-vessel aortic arch, normal variant. No abrupt cut off/large vessel  occlusion, flow-limiting stenosis, dissection, or aneurysm. There is  atherosclerosis of the bilateral carotid siphons without flow-limiting  stenosis per NASCET criteria (less than 50%).     Extravascular findings:  There are a few scattered solid noncalcified nodules in the lung apices  measuring up to 7 mm in diameter. There are partially imaged median  sternotomy wires. Homogeneous attenuation of the thyroid gland. No  pharyngeal or laryngeal mass lesion. The bones are demineralized. No  acute or suspicious bony findings. The patient is edentulous.       Impression:      Abrupt cut off of the distal right M1 segment. There is corresponding  perfusion abnormality in the right MCA territory with 6 mL of core  infarct and 97 mL of salvageable ischemic penumbra.     There are multiple scattered solid nodules in the lung apices measuring  up to 7 mm. Consider CT of the chest for further assessment and to  exclude pulmonary metastases. At minimum, per Fleischner criteria, CT  follow-up in 3-6 months is recommended, if in line with goals of care.     Findings discussed with stroke navigator by Dr. Andrea Skaggs via  telephone at 2:30 PM 10/31/2022. The patient was in transit to Cath Lab  at time of conversation.     This report was finalized on 10/31/2022 2:50 PM by Andrea Skaggs MD.       CT Angiogram Neck [[id number]] Collected: 10/31/22 1434     Updated: 10/31/22 1453    Narrative:      DATE OF EXAM: 10/31/2022 1:58 PM     PROCEDURE: CT CEREBRAL PERFUSION W WO CONTRAST-, CT ANGIOGRAM NECK-, CT  ANGIOGRAM HEAD W AI ANALYSIS OF LVO-     INDICATIONS: Neuro deficit, acute, stroke suspected     COMPARISON: Concurrent noncontrast CT the head     TECHNIQUE: Routine transaxial cuts were obtained through the head  without administration of contrast. Routine transaxial cuts were  then  obtained through the head following the intravenous administration of  115 mL of Isovue 370. Core blood volume, core blood flow, mean transit  time, and Tmax images were obtained utilizing the Rapid software  protocol. A limited CT angiogram of the head was also performed to  measure the blood vessel density.     CTA of the head and CTA of the neck was performed after the intravenous  administration of above contrast. Reconstructed coronal and sagittal  images were also obtained. In addition, a 3-D volume rendered image was  obtained after post processing. Automated exposure control and iterative  reconstruction methods were used. AI analysis of LVO was utilized for  the CTA Head imaging portion of the study.      The radiation dose reduction device was turned on for each scan per the  ALARA (As Low as Reasonably Achievable) protocol.     Stenosis measurement was performed by the NASCET or similar method.        FINDINGS:   CT cerebral perfusion:  There is a large perfusion abnormality in the right MCA territory with 6  mL of calculated core infarct and 97 mL of ischemic penumbra.     CTA HEAD:  There is abrupt cut off/large vessel occlusion of the distal right M1  segment (series 7 image 307). No additional large vessel occlusion or  flow-limiting stenosis is identified. There are scattered luminal  irregularities of the anterior and posterior intracranial circulation  likely on the basis of atherosclerosis. There is atherosclerosis of the  bilateral carotid siphons and bilateral V4 segments. No evidence of  aneurysm or dissection. The cerebral veins and dural venous sinuses are  patent.     CTA NECK:  Two-vessel aortic arch, normal variant. No abrupt cut off/large vessel  occlusion, flow-limiting stenosis, dissection, or aneurysm. There is  atherosclerosis of the bilateral carotid siphons without flow-limiting  stenosis per NASCET criteria (less than 50%).     Extravascular findings:  There are a few  scattered solid noncalcified nodules in the lung apices  measuring up to 7 mm in diameter. There are partially imaged median  sternotomy wires. Homogeneous attenuation of the thyroid gland. No  pharyngeal or laryngeal mass lesion. The bones are demineralized. No  acute or suspicious bony findings. The patient is edentulous.       Impression:      Abrupt cut off of the distal right M1 segment. There is corresponding  perfusion abnormality in the right MCA territory with 6 mL of core  infarct and 97 mL of salvageable ischemic penumbra.     There are multiple scattered solid nodules in the lung apices measuring  up to 7 mm. Consider CT of the chest for further assessment and to  exclude pulmonary metastases. At minimum, per Fleischner criteria, CT  follow-up in 3-6 months is recommended, if in line with goals of care.     Findings discussed with stroke navigator by Dr. Andrea Skaggs via  telephone at 2:30 PM 10/31/2022. The patient was in transit to Cath Lab  at time of conversation.     This report was finalized on 10/31/2022 2:50 PM by Andrea Skaggs MD.       CT CEREBRAL PERFUSION WITH & WITHOUT CONTRAST [[id number]] Collected: 10/31/22 1434     Updated: 10/31/22 1453    Narrative:      DATE OF EXAM: 10/31/2022 1:58 PM     PROCEDURE: CT CEREBRAL PERFUSION W WO CONTRAST-, CT ANGIOGRAM NECK-, CT  ANGIOGRAM HEAD W AI ANALYSIS OF LVO-     INDICATIONS: Neuro deficit, acute, stroke suspected     COMPARISON: Concurrent noncontrast CT the head     TECHNIQUE: Routine transaxial cuts were obtained through the head  without administration of contrast. Routine transaxial cuts were then  obtained through the head following the intravenous administration of  115 mL of Isovue 370. Core blood volume, core blood flow, mean transit  time, and Tmax images were obtained utilizing the Rapid software  protocol. A limited CT angiogram of the head was also performed to  measure the blood vessel density.     CTA of the head and CTA of the  neck was performed after the intravenous  administration of above contrast. Reconstructed coronal and sagittal  images were also obtained. In addition, a 3-D volume rendered image was  obtained after post processing. Automated exposure control and iterative  reconstruction methods were used. AI analysis of LVO was utilized for  the CTA Head imaging portion of the study.      The radiation dose reduction device was turned on for each scan per the  ALARA (As Low as Reasonably Achievable) protocol.     Stenosis measurement was performed by the NASCET or similar method.        FINDINGS:   CT cerebral perfusion:  There is a large perfusion abnormality in the right MCA territory with 6  mL of calculated core infarct and 97 mL of ischemic penumbra.     CTA HEAD:  There is abrupt cut off/large vessel occlusion of the distal right M1  segment (series 7 image 307). No additional large vessel occlusion or  flow-limiting stenosis is identified. There are scattered luminal  irregularities of the anterior and posterior intracranial circulation  likely on the basis of atherosclerosis. There is atherosclerosis of the  bilateral carotid siphons and bilateral V4 segments. No evidence of  aneurysm or dissection. The cerebral veins and dural venous sinuses are  patent.     CTA NECK:  Two-vessel aortic arch, normal variant. No abrupt cut off/large vessel  occlusion, flow-limiting stenosis, dissection, or aneurysm. There is  atherosclerosis of the bilateral carotid siphons without flow-limiting  stenosis per NASCET criteria (less than 50%).     Extravascular findings:  There are a few scattered solid noncalcified nodules in the lung apices  measuring up to 7 mm in diameter. There are partially imaged median  sternotomy wires. Homogeneous attenuation of the thyroid gland. No  pharyngeal or laryngeal mass lesion. The bones are demineralized. No  acute or suspicious bony findings. The patient is edentulous.       Impression:      Abrupt cut  off of the distal right M1 segment. There is corresponding  perfusion abnormality in the right MCA territory with 6 mL of core  infarct and 97 mL of salvageable ischemic penumbra.     There are multiple scattered solid nodules in the lung apices measuring  up to 7 mm. Consider CT of the chest for further assessment and to  exclude pulmonary metastases. At minimum, per Fleischner criteria, CT  follow-up in 3-6 months is recommended, if in line with goals of care.     Findings discussed with stroke navigator by Dr. Andrea Skaggs via  telephone at 2:30 PM 10/31/2022. The patient was in transit to Cath Lab  at time of conversation.     This report was finalized on 10/31/2022 2:50 PM by Andrea kSaggs MD.       CT Head Without Contrast Stroke Protocol [[id number]] Collected: 10/31/22 1420     Updated: 10/31/22 1426    Narrative:      DATE OF EXAM: 10/31/2022 1:48 PM     PROCEDURE: CT HEAD WO CONTRAST STROKE PROTOCOL-     INDICATIONS: Stroke, follow up     COMPARISON: July 22, 2021     TECHNIQUE: Routine transaxial and coronal reconstruction images were  obtained through the head without the administration of contrast.  Automated exposure control and iterative reconstruction methods were  used.        FINDINGS:  There is a hypodense area within the vidal which seems like a change from  prior study and could relate to interval infarction within this area.  There are periventricular and deep white matter changes which could  reflect more chronic small vessel ischemic change. The patient had old  left occipital infarct. The paranasal sinuses seem relatively clear. The  mastoids do not appear unusual. Vascular calculation is noted along the  wall the distal vertebral arteries. There some calcification along the  wall of the cavernous portion of the internal carotid arteries as well.       Impression:      1.  There are white matter changes involving both cerebral hemispheres  which could reflect more chronic small vessel  ischemic change.  2.  Evidence for old insult left occipital lobe.  3.  Atherosclerotic change  4.  Hypodense area within the vidal which seems like a change and could  relate to interval ischemia with respect to this area. MRI may be of  benefit to reassess this patient.     Study was reviewed on the CT scan monitor and discussed with the stroke  team navigator at 1:46 PM.     This report was finalized on 10/31/2022 2:23 PM by Rai Rivera MD.           Impression: CVA  Dementia  Change mental status  Dyspnea  Restlessness  Goals of care  Plan: Did have a conversation with the patient's , please note that  was used for the conversation.  Did discuss patient's overall poor prognosis as well as discussing family's options.  Ultimately after a very long discussion of about 45 minutes  does agree with DNR/DNI with no escalation of care beyond using a Venturi mask to help with the patient's hypoxia.  Did discuss that we could have another evaluation for speech tomorrow if the patient improves, however did tell the  that I am afraid that the patient will actually start declining more.   understands this.   understand the patient may not even survive his hospitalization.            Barry Helms,   11/01/22  13:35 EDT

## 2022-11-01 NOTE — PROGRESS NOTES
[facility] Medicine Services  PROGRESS NOTE    Patient Name: Angeles Hoffman  : 1940  MRN: [id number]    Date of Admission: 10/31/2022  Primary Care Physician: Provider, No Known    Subjective   Subjective     CC:  Facial droop and speech difficulty.    HPI:  Resting in bed in no acute hemodynamic or respiratory distress.  Looks comfortable but she is not following any commands.    ROS:  Unable to obtain    Objective   Objective     Vital Signs:   Temp:  [97.2 °F (36.2 °C)-99 °F (37.2 °C)] 97.2 °F (36.2 °C)  Heart Rate:  [76-92] 89  Resp:  [20-22] 22  BP: (148-203)/() 172/82  Flow (L/min):  [4-6] 4     Physical Exam:  Constitutional: No acute hemodynamic or respiratory distress  HENT: NCAT, mucous membranes moist  Respiratory: Clear to auscultation bilaterally, respiratory effort normal   Cardiovascular: RRR, no murmurs, rubs, or gallops  Gastrointestinal: Abdomen is obese.  Positive bowel sounds, soft, nontender, nondistended  Musculoskeletal:  bilateral ankle edema, morbid obesity  Psychiatric: Unable to assess  Neurologic: Awake, alert, does not follow commands  Skin: No rashes    Results Reviewed:  LAB RESULTS:      Lab 22  0310 10/31/22  1914 10/31/22  1353 10/31/22  1350 10/31/22  1349   WBC 6.27  --   --   --  5.31   HEMOGLOBIN 13.8  --   --   --  13.5   HEMOGLOBIN, POC  --   --   --  14.6  --    HEMATOCRIT 44.8  --   --   --  44.1   HEMATOCRIT POC  --   --   --  43  --    PLATELETS 125*  --   --   --  132*   NEUTROS ABS 4.63  --   --   --  3.40   IMMATURE GRANS (ABS) 0.02  --   --   --  0.01   LYMPHS ABS 1.20  --   --   --  1.38   MONOS ABS 0.35  --   --   --  0.40   EOS ABS 0.04  --   --   --  0.10   MCV 94.7  --   --   --  95.2   PROTIME  --   --  12.6*  --  12.2   APTT  --   --   --   --  33.4*   HEPARIN ANTI-XA 0.10* 0.10*  --   --   --          Lab 22  0310 10/31/22  1350 10/31/22  1349   SODIUM  --   --  139   POTASSIUM  --   --  4.4   CHLORIDE  --    --  103   CO2  --   --  28.0   ANION GAP  --   --  8.0   BUN  --   --  18   CREATININE  --  1.00 0.93   EGFR  --  55.7* 60.7   GLUCOSE  --   --  308*   CALCIUM  --   --  8.6   HEMOGLOBIN A1C 9.70*  --   --          Lab 10/31/22  1349   TOTAL PROTEIN 7.0   ALBUMIN 3.80   GLOBULIN 3.2   ALT (SGPT) 16   AST (SGOT) 18   BILIRUBIN 0.3   ALK PHOS 86         Lab 10/31/22  1353 10/31/22  1349   TROPONIN T  --  0.019   PROTIME 12.6* 12.2   INR 1.1 0.92         Lab 11/01/22  0310   CHOLESTEROL 133   LDL CHOL 51   HDL CHOL 66*   TRIGLYCERIDES 81             Brief Urine Lab Results  (Last result in the past 365 days)      Color   Clarity   Blood   Leuk Est   Nitrite   Protein   CREAT   Urine HCG        10/31/22 1438 Yellow   Clear   Negative   Negative   Negative   100 mg/dL (2+)                 Microbiology Results Abnormal     None          Adult Transthoracic Echo Complete W/ Cont if Necessary Per Protocol (With Agitated Saline)    Result Date: 11/1/2022  •  Left ventricular systolic function is moderately decreased. Left ventricular ejection fraction appears to be 31 - 35%. •  Left ventricular wall thickness is consistent with mild concentric hypertrophy. •  Left ventricular diastolic dysfunction is noted. •  Left atrial volume is moderately increased. •  Estimated right ventricular systolic pressure from tricuspid regurgitation is normal (<35 mmHg). Calculated right ventricular systolic pressure from tricuspid regurgitation is 15 mmHg.     CT Angiogram Neck    Result Date: 10/31/2022  DATE OF EXAM: 10/31/2022 1:58 PM  PROCEDURE: CT CEREBRAL PERFUSION W WO CONTRAST-, CT ANGIOGRAM NECK-, CT ANGIOGRAM HEAD W AI ANALYSIS OF LVO-  INDICATIONS: Neuro deficit, acute, stroke suspected  COMPARISON: Concurrent noncontrast CT the head  TECHNIQUE: Routine transaxial cuts were obtained through the head without administration of contrast. Routine transaxial cuts were then obtained through the head following the intravenous  administration of 115 mL of Isovue 370. Core blood volume, core blood flow, mean transit time, and Tmax images were obtained utilizing the Rapid software protocol. A limited CT angiogram of the head was also performed to measure the blood vessel density.  CTA of the head and CTA of the neck was performed after the intravenous administration of above contrast. Reconstructed coronal and sagittal images were also obtained. In addition, a 3-D volume rendered image was obtained after post processing. Automated exposure control and iterative reconstruction methods were used. AI analysis of LVO was utilized for the CTA Head imaging portion of the study.  The radiation dose reduction device was turned on for each scan per the ALARA (As Low as Reasonably Achievable) protocol.  Stenosis measurement was performed by the NASCET or similar method.   FINDINGS: CT cerebral perfusion: There is a large perfusion abnormality in the right MCA territory with 6 mL of calculated core infarct and 97 mL of ischemic penumbra.  CTA HEAD: There is abrupt cut off/large vessel occlusion of the distal right M1 segment (series 7 image 307). No additional large vessel occlusion or flow-limiting stenosis is identified. There are scattered luminal irregularities of the anterior and posterior intracranial circulation likely on the basis of atherosclerosis. There is atherosclerosis of the bilateral carotid siphons and bilateral V4 segments. No evidence of aneurysm or dissection. The cerebral veins and dural venous sinuses are patent.  CTA NECK: Two-vessel aortic arch, normal variant. No abrupt cut off/large vessel occlusion, flow-limiting stenosis, dissection, or aneurysm. There is atherosclerosis of the bilateral carotid siphons without flow-limiting stenosis per NASCET criteria (less than 50%).  Extravascular findings: There are a few scattered solid noncalcified nodules in the lung apices measuring up to 7 mm in diameter. There are partially imaged  median sternotomy wires. Homogeneous attenuation of the thyroid gland. No pharyngeal or laryngeal mass lesion. The bones are demineralized. No acute or suspicious bony findings. The patient is edentulous.      Impression: Abrupt cut off of the distal right M1 segment. There is corresponding perfusion abnormality in the right MCA territory with 6 mL of core infarct and 97 mL of salvageable ischemic penumbra.  There are multiple scattered solid nodules in the lung apices measuring up to 7 mm. Consider CT of the chest for further assessment and to exclude pulmonary metastases. At minimum, per Fleischner criteria, CT follow-up in 3-6 months is recommended, if in line with goals of care.  Findings discussed with stroke navigator by Dr. Andrea Skaggs via telephone at 2:30 PM 10/31/2022. The patient was in transit to Cath Lab at time of conversation.  This report was finalized on 10/31/2022 2:50 PM by Andrea Skaggs MD.      XR Chest 1 View    Result Date: 10/31/2022  DATE OF EXAM: 10/31/2022 4:22 PM  PROCEDURE: XR CHEST 1 VW-  INDICATIONS: Stroke Protocol (Onset > 12 Hrs); I63.9-Cerebral infarction, unspecified; R53.1-Weakness  COMPARISON: July 22, 2021  TECHNIQUE: Single radiographic AP view of the chest was obtained.  FINDINGS: Sternotomy wires are noted. The heart looks enlarged. There are prominent markings right upper lobe. Whether this could be secondary to acute infiltrate reflect more of a confluence of shadows is uncertain. There are no pleural effusions. There is slight prominence of central pulmonary vasculature that could reflect some vascular congestion.      Impression: 1.  Cardiomegaly. 2.  Prominence of markings right upper lobe. An inflammatory infectious process could not be excluded. 3.  There probably is some underlying pulmonary vascular congestion.  This report was finalized on 10/31/2022 4:57 PM by Rai Rivera MD.      CT Head Without Contrast Stroke Protocol    Result Date:  11/1/2022  EXAMINATION: CT HEAD WITHOUT CONTRAST DATE: 10/31/2022 11:32 PM  INDICATION: Stroke, follow up Code stroke, left side flaccid, facial droop, dysarthria, scan time: 0134  COMPARISON: CT head 10/31/2022.  TECHNIQUE:  Routine axial images through the head without contrast. Coronal reformations. Low-dose CT acquisition technique included one or more of the following options: 1. Automated exposure control, 2. Adjustment of mA and/or KV according to patient's size and/or 3. Use of iterative reconstruction. FINDINGS:  Intracranial contents: Hypodensity in the right temporal lobe, right insula extending to the right parietal region measures approximately 3.3 x 7.9 cm. Hyperdense right middle cerebral artery from thrombus. Ventricular and cisternal spaces are prominent from volume loss. Severe periventricular and subcortical white matter hypodensities. Hypodensity/encephalomalacia left occipital lobe measures 1.5 x 2.3 cm. No dominant mass, midline shift, hydrocephalus, extra axial fluid collection or acute hemorrhage. No asymmetric hyperdensity of the proximal major cerebral arteries. Craniocervical junction is patent. Bones and extracranial soft tissues: Mild mucosal thickening ethmoid air cells, maxillary sinuses. Otherwise, Visualized paranasal sinuses and mastoid air cells are clear.  Skull is intact. Cataract surgery on the right. Degenerative changes temporomandibular joints. Calcifications distal internal carotid arteries and distal vertebral arteries.     Impression: 1. Acute infarct in the right middle cerebral artery territory involving the right temporal lobe, right insula extending to the right parietal region. 2. Thrombus in the right middle cerebral artery. 3. Severe changes small vessel ischemic disease of indeterminate age, presumably mostly chronic. Volume loss. Atherosclerosis. To chronic infarct left occipital lobe. Report called to ARNAUD DILL at 11/1/2022 1:45 AM Electronically signed  by:  Isaiah Nuñez M.D.  11/1/2022 12:05 AM Mountain Time    CT Head Without Contrast Stroke Protocol    Result Date: 10/31/2022  DATE OF EXAM: 10/31/2022 1:48 PM  PROCEDURE: CT HEAD WO CONTRAST STROKE PROTOCOL-  INDICATIONS: Stroke, follow up  COMPARISON: July 22, 2021  TECHNIQUE: Routine transaxial and coronal reconstruction images were obtained through the head without the administration of contrast. Automated exposure control and iterative reconstruction methods were used.   FINDINGS: There is a hypodense area within the vidal which seems like a change from prior study and could relate to interval infarction within this area. There are periventricular and deep white matter changes which could reflect more chronic small vessel ischemic change. The patient had old left occipital infarct. The paranasal sinuses seem relatively clear. The mastoids do not appear unusual. Vascular calculation is noted along the wall the distal vertebral arteries. There some calcification along the wall of the cavernous portion of the internal carotid arteries as well.      Impression: 1.  There are white matter changes involving both cerebral hemispheres which could reflect more chronic small vessel ischemic change. 2.  Evidence for old insult left occipital lobe. 3.  Atherosclerotic change 4.  Hypodense area within the vidal which seems like a change and could relate to interval ischemia with respect to this area. MRI may be of benefit to reassess this patient.  Study was reviewed on the CT scan monitor and discussed with the stroke team navigator at 1:46 PM.  This report was finalized on 10/31/2022 2:23 PM by Rai Rivera MD.      CT Angiogram Head w AI Analysis of LVO    Result Date: 10/31/2022  DATE OF EXAM: 10/31/2022 1:58 PM  PROCEDURE: CT CEREBRAL PERFUSION W WO CONTRAST-, CT ANGIOGRAM NECK-, CT ANGIOGRAM HEAD W AI ANALYSIS OF LVO-  INDICATIONS: Neuro deficit, acute, stroke suspected  COMPARISON: Concurrent noncontrast CT  the head  TECHNIQUE: Routine transaxial cuts were obtained through the head without administration of contrast. Routine transaxial cuts were then obtained through the head following the intravenous administration of 115 mL of Isovue 370. Core blood volume, core blood flow, mean transit time, and Tmax images were obtained utilizing the Rapid software protocol. A limited CT angiogram of the head was also performed to measure the blood vessel density.  CTA of the head and CTA of the neck was performed after the intravenous administration of above contrast. Reconstructed coronal and sagittal images were also obtained. In addition, a 3-D volume rendered image was obtained after post processing. Automated exposure control and iterative reconstruction methods were used. AI analysis of LVO was utilized for the CTA Head imaging portion of the study.  The radiation dose reduction device was turned on for each scan per the ALARA (As Low as Reasonably Achievable) protocol.  Stenosis measurement was performed by the NASCET or similar method.   FINDINGS: CT cerebral perfusion: There is a large perfusion abnormality in the right MCA territory with 6 mL of calculated core infarct and 97 mL of ischemic penumbra.  CTA HEAD: There is abrupt cut off/large vessel occlusion of the distal right M1 segment (series 7 image 307). No additional large vessel occlusion or flow-limiting stenosis is identified. There are scattered luminal irregularities of the anterior and posterior intracranial circulation likely on the basis of atherosclerosis. There is atherosclerosis of the bilateral carotid siphons and bilateral V4 segments. No evidence of aneurysm or dissection. The cerebral veins and dural venous sinuses are patent.  CTA NECK: Two-vessel aortic arch, normal variant. No abrupt cut off/large vessel occlusion, flow-limiting stenosis, dissection, or aneurysm. There is atherosclerosis of the bilateral carotid siphons without flow-limiting  stenosis per NASCET criteria (less than 50%).  Extravascular findings: There are a few scattered solid noncalcified nodules in the lung apices measuring up to 7 mm in diameter. There are partially imaged median sternotomy wires. Homogeneous attenuation of the thyroid gland. No pharyngeal or laryngeal mass lesion. The bones are demineralized. No acute or suspicious bony findings. The patient is edentulous.      Impression: Abrupt cut off of the distal right M1 segment. There is corresponding perfusion abnormality in the right MCA territory with 6 mL of core infarct and 97 mL of salvageable ischemic penumbra.  There are multiple scattered solid nodules in the lung apices measuring up to 7 mm. Consider CT of the chest for further assessment and to exclude pulmonary metastases. At minimum, per Fleischner criteria, CT follow-up in 3-6 months is recommended, if in line with goals of care.  Findings discussed with stroke navigator by Dr. Andrea Skaggs via telephone at 2:30 PM 10/31/2022. The patient was in transit to Cath Lab at time of conversation.  This report was finalized on 10/31/2022 2:50 PM by Andrea Skaggs MD.      CT CEREBRAL PERFUSION WITH & WITHOUT CONTRAST    Result Date: 10/31/2022  DATE OF EXAM: 10/31/2022 1:58 PM  PROCEDURE: CT CEREBRAL PERFUSION W WO CONTRAST-, CT ANGIOGRAM NECK-, CT ANGIOGRAM HEAD W AI ANALYSIS OF LVO-  INDICATIONS: Neuro deficit, acute, stroke suspected  COMPARISON: Concurrent noncontrast CT the head  TECHNIQUE: Routine transaxial cuts were obtained through the head without administration of contrast. Routine transaxial cuts were then obtained through the head following the intravenous administration of 115 mL of Isovue 370. Core blood volume, core blood flow, mean transit time, and Tmax images were obtained utilizing the Rapid software protocol. A limited CT angiogram of the head was also performed to measure the blood vessel density.  CTA of the head and CTA of the neck was  performed after the intravenous administration of above contrast. Reconstructed coronal and sagittal images were also obtained. In addition, a 3-D volume rendered image was obtained after post processing. Automated exposure control and iterative reconstruction methods were used. AI analysis of LVO was utilized for the CTA Head imaging portion of the study.  The radiation dose reduction device was turned on for each scan per the ALARA (As Low as Reasonably Achievable) protocol.  Stenosis measurement was performed by the NASCET or similar method.   FINDINGS: CT cerebral perfusion: There is a large perfusion abnormality in the right MCA territory with 6 mL of calculated core infarct and 97 mL of ischemic penumbra.  CTA HEAD: There is abrupt cut off/large vessel occlusion of the distal right M1 segment (series 7 image 307). No additional large vessel occlusion or flow-limiting stenosis is identified. There are scattered luminal irregularities of the anterior and posterior intracranial circulation likely on the basis of atherosclerosis. There is atherosclerosis of the bilateral carotid siphons and bilateral V4 segments. No evidence of aneurysm or dissection. The cerebral veins and dural venous sinuses are patent.  CTA NECK: Two-vessel aortic arch, normal variant. No abrupt cut off/large vessel occlusion, flow-limiting stenosis, dissection, or aneurysm. There is atherosclerosis of the bilateral carotid siphons without flow-limiting stenosis per NASCET criteria (less than 50%).  Extravascular findings: There are a few scattered solid noncalcified nodules in the lung apices measuring up to 7 mm in diameter. There are partially imaged median sternotomy wires. Homogeneous attenuation of the thyroid gland. No pharyngeal or laryngeal mass lesion. The bones are demineralized. No acute or suspicious bony findings. The patient is edentulous.      Impression: Abrupt cut off of the distal right M1 segment. There is corresponding  perfusion abnormality in the right MCA territory with 6 mL of core infarct and 97 mL of salvageable ischemic penumbra.  There are multiple scattered solid nodules in the lung apices measuring up to 7 mm. Consider CT of the chest for further assessment and to exclude pulmonary metastases. At minimum, per Fleischner criteria, CT follow-up in 3-6 months is recommended, if in line with goals of care.  Findings discussed with stroke navigator by Dr. Andrea Skaggs via telephone at 2:30 PM 10/31/2022. The patient was in transit to Cath Lab at time of conversation.  This report was finalized on 10/31/2022 2:50 PM by Andrea Skaggs MD.      Aborted Cath Cath    Result Date: 10/31/2022  Aborted Case      Results for orders placed during the hospital encounter of 10/31/22    Adult Transthoracic Echo Complete W/ Cont if Necessary Per Protocol (With Agitated Saline)    Interpretation Summary  •  Left ventricular systolic function is moderately decreased. Left ventricular ejection fraction appears to be 31 - 35%.  •  Left ventricular wall thickness is consistent with mild concentric hypertrophy.  •  Left ventricular diastolic dysfunction is noted.  •  Left atrial volume is moderately increased.  •  Estimated right ventricular systolic pressure from tricuspid regurgitation is normal (<35 mmHg). Calculated right ventricular systolic pressure from tricuspid regurgitation is 15 mmHg.      I have reviewed the medications:  Scheduled Meds:[START ON 11/2/2022] aspirin, 81 mg, Oral, Daily   Or  [START ON 11/2/2022] aspirin, 300 mg, Rectal, Daily  atorvastatin, 80 mg, Oral, Nightly  clopidogrel, 300 mg, Oral, Once   And  clopidogrel, 75 mg, Oral, Daily  clopidogrel, 300 mg, Oral, Once  insulin lispro, 0-7 Units, Subcutaneous, Q6H  sodium chloride, 10 mL, Intravenous, Q12H      Continuous Infusions:   PRN Meds:.•  dextrose  •  dextrose  •  glucagon (human recombinant)  •  metoprolol tartrate  •  Morphine  •  ondansetron  •  palliative  care oral rinse  •  sodium chloride  •  sodium chloride  •  sodium chloride    Assessment & Plan   Assessment & Plan     Active Hospital Problems    Diagnosis  POA   • **Cerebrovascular accident (CVA), unspecified mechanism (HCC) [I63.9]  Yes      Resolved Hospital Problems   No resolved problems to display.        Brief Hospital Course to date:  Angeles Hoffman is a 82 y.o. female with history of HTN, DM HL, CAD/CABG, chronic cough, CVA, possibly on Xarelto (data deficit), brought to ED with acute change in speech and new facial droop; found to have acute R MCA CVA.  After discussion she decided not to proceed with thrombectomy.       CVA, R MCA  - abrupt cut-off of R MCA M1 segment  - seen by neurosurgeon and neurologist; plan is ASA Plavix and heparin gtt as well as statin  -Since admission to the floor palliative has seen the patient and talk to the family and made the patient DNR.     HTN  - goal below 180 while on hep gtt       DM  - per med rec, she takes 3 oral meds and Lantus 15 units QHS  - SSI for now with Levemir 10 units QHS; adjust as needed     CAD/CABG  CHF  - home meds may include diuretic, imdur, BB, ARB,   -Patient is n.p.o.     Noncalcified nodules in lung apices   - max 7mm, seen on neck imaging        Expected Discharge Location and Transportation: To be determined  Expected Discharge Date: To be determined    DVT prophylaxis:  Mechanical DVT prophylaxis orders are present.     AM-PAC 6 Clicks Score (PT): 6 (11/01/22 2015)    CODE STATUS:   Code Status and Medical Interventions:   Ordered at: 11/01/22 1313     Medical Intervention Limits:    NO cardioversion    NO intubation (DNI)    NO dialysis    NO vasopressors    NO blood products    NO artificial nutrition     Code Status (Patient has no pulse and is not breathing):    No CPR (Do Not Attempt to Resuscitate)     Medical Interventions (Patient has pulse or is breathing):    Limited Support     Comments:    no bipap, no icu       Joao  MD Jean  11/01/22

## 2022-11-01 NOTE — PLAN OF CARE
VSS. A&Ox3.  NSR.  O2 4L NC.  NIH 4.  Heparin infusing at 10units/kg/hr.   at bedside as patient is [place] speaking only.  Morphine given x1 for headache.  MRI metal screening form completed.

## 2022-11-01 NOTE — PROGRESS NOTES
Stroke Progress Note       Chief Complaint: Left-sided weakness    Subjective    Subjective     Subjective:  Clinically got worse, with significant left hemineglect and left-sided weakness    Review of Systems   Unable to obtain due to patient condition  Objective      Temp:  [97.2 °F (36.2 °C)-99 °F (37.2 °C)] 97.2 °F (36.2 °C)  Heart Rate:  [76-92] 89  Resp:  [20-22] 22  BP: (148-203)/() 172/82    -Significant left hemineglect, right gaze preference, asomatognosia,   - left upper and left lower extremity weakness, 2 /5    Results Review:    I reviewed the patient's new clinical results.    Lab Results (last 24 hours)     Procedure Component Value Units Date/Time    POC Glucose Once [[id number]]  (Abnormal) Collected: 11/01/22 0922    Specimen: Blood Updated: 11/01/22 0923     Glucose 193 mg/dL      Comment: Meter: [id number] : [id number] [place]       POC Glucose Once [[id number]]  (Abnormal) Collected: 11/01/22 0724    Specimen: Blood Updated: 11/01/22 0726     Glucose 182 mg/dL      Comment: Meter: [id number] : [id number] [place]       Lipid Panel [[id number]]  (Abnormal) Collected: 11/01/22 0310    Specimen: Blood Updated: 11/01/22 0402     Total Cholesterol 133 mg/dL      Triglycerides 81 mg/dL      HDL Cholesterol 66 mg/dL      LDL Cholesterol  51 mg/dL      VLDL Cholesterol 16 mg/dL      LDL/HDL Ratio 0.77    Narrative:      Cholesterol Reference Ranges  (U.S. Department of Health and Human Services ATP III Classifications)    Desirable          <200 mg/dL  Borderline High    200-239 mg/dL  High Risk          >240 mg/dL      Triglyceride Reference Ranges  (U.S. Department of Health and Human Services ATP III Classifications)    Normal           <150 mg/dL  Borderline High  150-199 mg/dL  High             200-499 mg/dL  Very High        >500 mg/dL    HDL Reference Ranges  (U.S. Department of Health and Human Services ATP III Classifications)    Low     <40 mg/dl (major risk factor for  CHD)  High    >60 mg/dl ('negative' risk factor for CHD)        LDL Reference Ranges  (U.S. Department of Health and Human Services ATP III Classifications)    Optimal          <100 mg/dL  Near Optimal     100-129 mg/dL  Borderline High  130-159 mg/dL  High             160-189 mg/dL  Very High        >189 mg/dL    Hemoglobin A1c [[id number]]  (Abnormal) Collected: 11/01/22 0310    Specimen: Blood Updated: 11/01/22 0349     Hemoglobin A1C 9.70 %     Narrative:      Hemoglobin A1C Ranges:    Increased Risk for Diabetes  5.7% to 6.4%  Diabetes                     >= 6.5%  Diabetic Goal                < 7.0%    Heparin Anti-Xa [[id number]]  (Abnormal) Collected: 11/01/22 0310    Specimen: Blood Updated: 11/01/22 0349     Heparin Anti-Xa (UFH) 0.10 IU/ml     CBC & Differential [[id number]]  (Abnormal) Collected: 11/01/22 0310    Specimen: Blood Updated: 11/01/22 0331    Narrative:      The following orders were created for panel order CBC & Differential.  Procedure                               Abnormality         Status                     ---------                               -----------         ------                     CBC Auto Differential[[id number]]        Abnormal            Final result                 Please view results for these tests on the individual orders.    CBC Auto Differential [[id number]]  (Abnormal) Collected: 11/01/22 0310    Specimen: Blood Updated: 11/01/22 0331     WBC 6.27 10*3/mm3      RBC 4.73 10*6/mm3      Hemoglobin 13.8 g/dL      Hematocrit 44.8 %      MCV 94.7 fL      MCH 29.2 pg      MCHC 30.8 g/dL      RDW 13.2 %      RDW-SD 46.5 fl      MPV 12.7 fL      Platelets 125 10*3/mm3      Neutrophil % 73.9 %      Lymphocyte % 19.1 %      Monocyte % 5.6 %      Eosinophil % 0.6 %      Basophil % 0.5 %      Immature Grans % 0.3 %      Neutrophils, Absolute 4.63 10*3/mm3      Lymphocytes, Absolute 1.20 10*3/mm3      Monocytes, Absolute 0.35 10*3/mm3      Eosinophils, Absolute 0.04 10*3/mm3       Basophils, Absolute 0.03 10*3/mm3      Immature Grans, Absolute 0.02 10*3/mm3      nRBC 0.0 /100 WBC     POC Glucose Once [[id number]]  (Abnormal) Collected: 11/01/22 0123    Specimen: Blood Updated: 11/01/22 0124     Glucose 235 mg/dL      Comment: Meter: [id number] : [id number] Raúl Tolbert       Heparin Anti-Xa [[id number]]  (Abnormal) Collected: 10/31/22 1914    Specimen: Blood Updated: 10/31/22 2016     Heparin Anti-Xa (UFH) 0.10 IU/ml     [place] Draw [[id number]] Collected: 10/31/22 1349    Specimen: Blood Updated: 10/31/22 1800    Narrative:      The following orders were created for panel order [place] Draw.  Procedure                               Abnormality         Status                     ---------                               -----------         ------                     Green Top (Gel)[[id number]]                                  Final result               Lavender Top[[id number]]                                     Final result               Gold Top - SST[[id number]]                                   Final result               Gray Top[[id number]]                                         Final result               Light Blue Top[[id number]]                                   Final result                 Please view results for these tests on the individual orders.    Gray Top [[id number]] Collected: 10/31/22 1349    Specimen: Blood Updated: 10/31/22 1800     Extra Tube Hold for add-ons.     Comment: Auto resulted.       POC Glucose Once [[id number]]  (Abnormal) Collected: 10/31/22 1650    Specimen: Blood Updated: 10/31/22 1652     Glucose 216 mg/dL      Comment: Meter: [id number] : [id number] Lou Val       Protime-INR [[id number]]  (Normal) Collected: 10/31/22 1349    Specimen: Blood Updated: 10/31/22 1556     Protime 12.2 Seconds      INR 0.92    aPTT [[id number]]  (Abnormal) Collected: 10/31/22 1349    Specimen: Blood Updated: 10/31/22 1556     PTT 33.4 seconds     Narrative:      PTT = The  equivalent PTT values for the therapeutic range of heparin levels at 0.3 to 0.5 U/ml are 60 to 70 seconds.    Light Blue Top [[id number]] Collected: 10/31/22 1349    Specimen: Blood Updated: 10/31/22 1502     Extra Tube Hold for add-ons.     Comment: Auto resulted       Green Top (Gel) [[id number]] Collected: 10/31/22 1349    Specimen: Blood Updated: 10/31/22 1502     Extra Tube Hold for add-ons.     Comment: Auto resulted.       Lavender Top [[id number]] Collected: 10/31/22 1349    Specimen: Blood Updated: 10/31/22 1502     Extra Tube hold for add-on     Comment: Auto resulted       Gold Top - SST [[id number]] Collected: 10/31/22 1349    Specimen: Blood Updated: 10/31/22 1502     Extra Tube Hold for add-ons.     Comment: Auto resulted.       Urinalysis With Microscopic If Indicated (No Culture) - Urine, Catheter [[id number]]  (Abnormal) Collected: 10/31/22 1438    Specimen: Urine, Catheter Updated: 10/31/22 1449     Color, UA Yellow     Appearance, UA Clear     pH, UA 7.5     Specific Gravity, UA 1.035     Glucose, UA >=1000 mg/dL (3+)     Ketones, UA Negative     Bilirubin, UA Negative     Blood, UA Negative     Protein,  mg/dL (2+)     Leuk Esterase, UA Negative     Nitrite, UA Negative     Urobilinogen, UA 0.2 E.U./dL    Urinalysis, Microscopic Only - Urine, Catheter [[id number]]  (Abnormal) Collected: 10/31/22 1438    Specimen: Urine, Catheter Updated: 10/31/22 1449     RBC, UA 0-2 /HPF      WBC, UA 0-2 /HPF      Bacteria, UA 3+ /HPF      Squamous Epithelial Cells, UA 0-2 /HPF      Hyaline Casts, UA 0-6 /LPF      Methodology Automated Microscopy    Comprehensive Metabolic Panel [[id number]]  (Abnormal) Collected: 10/31/22 1349    Specimen: Blood Updated: 10/31/22 1432     Glucose 308 mg/dL      BUN 18 mg/dL      Creatinine 0.93 mg/dL      Sodium 139 mmol/L      Potassium 4.4 mmol/L      Comment: Slight hemolysis detected by analyzer. Results may be affected.        Chloride 103 mmol/L      CO2 28.0 mmol/L       Calcium 8.6 mg/dL      Total Protein 7.0 g/dL      Albumin 3.80 g/dL      ALT (SGPT) 16 U/L      AST (SGOT) 18 U/L      Alkaline Phosphatase 86 U/L      Total Bilirubin 0.3 mg/dL      Globulin 3.2 gm/dL      Comment: Calculated Result        A/G Ratio 1.2 g/dL      BUN/Creatinine Ratio 19.4     Anion Gap 8.0 mmol/L      eGFR 60.7 mL/min/1.73      Comment: National Kidney Foundation and American Society of Nephrology (ASN) Task Force recommended calculation based on the Chronic Kidney Disease Epidemiology Collaboration (CKD-EPI) equation refit without adjustment for race.       Narrative:      GFR Normal >60  Chronic Kidney Disease <60  Kidney Failure <15    The GFR formula is only valid for adults with stable renal function between ages 18 and 70.    Troponin [[id number]]  (Normal) Collected: 10/31/22 1349    Specimen: Blood Updated: 10/31/22 1427     Troponin T 0.019 ng/mL     Narrative:      Troponin T Reference Range:  <= 0.03 ng/mL-   Negative for AMI  >0.03 ng/mL-     Abnormal for myocardial necrosis.  Clinicians would have to utilize clinical acumen, EKG, Troponin and serial changes to determine if it is an Acute Myocardial Infarction or myocardial injury due to an underlying chronic condition.       Results may be falsely decreased if patient taking Biotin.      CBC & Differential [[id number]]  (Abnormal) Collected: 10/31/22 1349    Specimen: Blood Updated: 10/31/22 1404    Narrative:      The following orders were created for panel order CBC & Differential.  Procedure                               Abnormality         Status                     ---------                               -----------         ------                     CBC Auto Differential[[id number]]        Abnormal            Final result                 Please view results for these tests on the individual orders.    CBC Auto Differential [[id number]]  (Abnormal) Collected: 10/31/22 1349    Specimen: Blood Updated: 10/31/22 1404     WBC 5.31  10*3/mm3      RBC 4.63 10*6/mm3      Hemoglobin 13.5 g/dL      Hematocrit 44.1 %      MCV 95.2 fL      MCH 29.2 pg      MCHC 30.6 g/dL      RDW 13.3 %      RDW-SD 46.7 fl      MPV 12.6 fL      Platelets 132 10*3/mm3      Neutrophil % 64.0 %      Lymphocyte % 26.0 %      Monocyte % 7.5 %      Eosinophil % 1.9 %      Basophil % 0.4 %      Immature Grans % 0.2 %      Neutrophils, Absolute 3.40 10*3/mm3      Lymphocytes, Absolute 1.38 10*3/mm3      Monocytes, Absolute 0.40 10*3/mm3      Eosinophils, Absolute 0.10 10*3/mm3      Basophils, Absolute 0.02 10*3/mm3      Immature Grans, Absolute 0.01 10*3/mm3      nRBC 0.0 /100 WBC     POC Protime / INR [[id number]]  (Abnormal) Collected: 10/31/22 1353    Specimen: Blood Updated: 10/31/22 1356     Protime 12.6 seconds      INR 1.1     Comment: Serial Number: [id number]:  [id number]       POC CHEM 8 [[id number]]  (Abnormal) Collected: 10/31/22 1350    Specimen: Blood Updated: 10/31/22 1356     Glucose 311 mg/dL      BUN 24 mg/dL      Creatinine 1.00 mg/dL      Sodium 139 mmol/L      POC Potassium 4.8 mmol/L      Chloride 99 mmol/L      Total CO2 32 mmol/L      Hemoglobin 14.6 g/dL      Comment: Serial Number: [id number]:  [id number]        Hematocrit 43 %      Ionized Calcium 1.24 mmol/L      eGFR 55.7 mL/min/1.73         CT Angiogram Neck    Result Date: 10/31/2022  Abrupt cut off of the distal right M1 segment. There is corresponding perfusion abnormality in the right MCA territory with 6 mL of core infarct and 97 mL of salvageable ischemic penumbra.  There are multiple scattered solid nodules in the lung apices measuring up to 7 mm. Consider CT of the chest for further assessment and to exclude pulmonary metastases. At minimum, per Fleischner criteria, CT follow-up in 3-6 months is recommended, if in line with goals of care.  Findings discussed with stroke navigator by Dr. Andrea Skaggs via telephone at 2:30 PM 10/31/2022. The patient was in transit to Cath Lab at time of  conversation.  This report was finalized on 10/31/2022 2:50 PM by Andrea Skaggs MD.      XR Chest 1 View    Result Date: 10/31/2022  1.  Cardiomegaly. 2.  Prominence of markings right upper lobe. An inflammatory infectious process could not be excluded. 3.  There probably is some underlying pulmonary vascular congestion.  This report was finalized on 10/31/2022 4:57 PM by Rai Rivera MD.      CT Head Without Contrast Stroke Protocol    Result Date: 11/1/2022  1. Acute infarct in the right middle cerebral artery territory involving the right temporal lobe, right insula extending to the right parietal region. 2. Thrombus in the right middle cerebral artery. 3. Severe changes small vessel ischemic disease of indeterminate age, presumably mostly chronic. Volume loss. Atherosclerosis. To chronic infarct left occipital lobe. Report called to ARNAUD DILL at 11/1/2022 1:45 AM Electronically signed by:  Isaiah Nueñz M.D.  11/1/2022 12:05 AM Mountain Time    CT Head Without Contrast Stroke Protocol    Result Date: 10/31/2022  1.  There are white matter changes involving both cerebral hemispheres which could reflect more chronic small vessel ischemic change. 2.  Evidence for old insult left occipital lobe. 3.  Atherosclerotic change 4.  Hypodense area within the vidal which seems like a change and could relate to interval ischemia with respect to this area. MRI may be of benefit to reassess this patient.  Study was reviewed on the CT scan monitor and discussed with the stroke team navigator at 1:46 PM.  This report was finalized on 10/31/2022 2:23 PM by Rai Rivera MD.      CT Angiogram Head w AI Analysis of LVO    Result Date: 10/31/2022  Abrupt cut off of the distal right M1 segment. There is corresponding perfusion abnormality in the right MCA territory with 6 mL of core infarct and 97 mL of salvageable ischemic penumbra.  There are multiple scattered solid nodules in the lung apices measuring up to 7 mm.  Consider CT of the chest for further assessment and to exclude pulmonary metastases. At minimum, per Fleischner criteria, CT follow-up in 3-6 months is recommended, if in line with goals of care.  Findings discussed with stroke navigator by Dr. Andrea Skaggs via telephone at 2:30 PM 10/31/2022. The patient was in transit to Cath Lab at time of conversation.  This report was finalized on 10/31/2022 2:50 PM by Andrea Skaggs MD.      CT CEREBRAL PERFUSION WITH & WITHOUT CONTRAST    Result Date: 10/31/2022  Abrupt cut off of the distal right M1 segment. There is corresponding perfusion abnormality in the right MCA territory with 6 mL of core infarct and 97 mL of salvageable ischemic penumbra.  There are multiple scattered solid nodules in the lung apices measuring up to 7 mm. Consider CT of the chest for further assessment and to exclude pulmonary metastases. At minimum, per Fleischner criteria, CT follow-up in 3-6 months is recommended, if in line with goals of care.  Findings discussed with stroke navigator by Dr. Andrea Skaggs via telephone at 2:30 PM 10/31/2022. The patient was in transit to Cath Lab at time of conversation.  This report was finalized on 10/31/2022 2:50 PM by Andrea Skaggs MD.      Results for orders placed during the hospital encounter of 10/31/22    Adult Transthoracic Echo Complete W/ Cont if Necessary Per Protocol (With Agitated Saline)    Interpretation Summary  •  Left ventricular systolic function is moderately decreased. Left ventricular ejection fraction appears to be 31 - 35%.  •  Left ventricular wall thickness is consistent with mild concentric hypertrophy.  •  Left ventricular diastolic dysfunction is noted.  •  Left atrial volume is moderately increased.  •  Estimated right ventricular systolic pressure from tricuspid regurgitation is normal (<35 mmHg). Calculated right ventricular systolic pressure from tricuspid regurgitation is 15 mmHg.            Assessment/Plan      Assessment/Plan:  82 female with extensive vascular risk factors presented with left-sided weakness, left visual field loss and some slurred speech.  CT head showed hypodensities in the left MCA, right MCA.  CTA evidence of distal left M1 occlusion.  CT perfusion showed slow flow in the right cerebral hemisphere.     Patient was out of the window for TNKase.  Neuro endovascular procedure was deferred due to poor functional baseline, significant comorbidities, and patient unwillingness to go through the procedure.      Acute ischemic stroke, right MCA territory, likely etiology cardioembolism versus iCAD.  -Repeat CT head this morning showed evolution of right MCA infarct.  -Patient clinical exam became worse overnight with significant left otis-neglect  -I discussed with the family at length, with the help of the /caregiver that, the stroke has accumulated significant comorbid to the patient, which will impact her quality of negatively.  -Family was understanding and would like to talk to palliative for further goals of care discussion.  -Continue aspirin 300 rectal for now.         Ubaldo Agudelo MD  11/01/22  12:32 EDT

## 2022-11-01 NOTE — SIGNIFICANT NOTE
CODE STROKE called at 0124, patient seen at 0124    Angeles Hoffman is an 82 year old female who presented on 10/31/2022 with weakness, speech difficulty, and facial droop. Her initial imaging showed an abrupt cut off of the distal right M1 segment with corresponding perfusion abnormality in the right MCA territory with 6 mL of core infarct and 97 mL of salvageable ischemic penumbra.  Patient was reluctant in pursuing intubation and denied endotracheal intubation for Cath Lab procedure (cerebral angiogram with potential palliative thrombectomy) with Dr. Rees on 10/31/2022, additionally the procedure was deemed high risk and she was deemed not a candidate for intervention. Multiple factors including patient's refusal for procedure, advanced age, and baseline status (mRS 3) all factored into the decision regarding surgical intervention.     I was asked to evaluate the patient this evening as a code stroke due to worsening neurologic status, her NIH earlier in the shift was a 5, now she is scoring a 24 due to left hemiparesis, left neglect, facial droop, aphasia, dysarthria, and decreased sensation.    Patient was taken for stat CT head which showed acute infarct in the right middle cerebral territory involving the right temporal lobe, right insula extending to the right parietal region and thrombus in the right middle cerebral artery.    Unable to perform ROS secondary to AMS.    Patient is drowsy, arousable to voice, speech is extremely dysarthric and aphasic, will follow intermittent commands, demonstrates left hemiparesis, left-sided neglect, decreased sensation on the left side, left arm visual field loss, and left facial droop. PERRL, left visual neglect present. /82.     Interval: baseline  1a. Level of Consciousness: 0-->Alert, keenly responsive  1b. LOC Questions: 2-->Answers neither question correctly  1c. LOC Commands: 1-->Performs one task correctly  2. Best Gaze: 2-->Forced deviation, or total gaze  paresis not overcome by the oculocephalic maneuver  3. Visual: 2-->Complete hemianopia  4. Facial Palsy: 2-->Partial paralysis (total or near-total paralysis of lower face)  5a. Motor Arm, Left: 3-->No effort against gravity, limb falls  5b. Motor Arm, Right: 1-->Drift, limb holds 90 (or 45) degrees, but drifts down before full 10 secs, does not hit bed or other support  6a. Motor Leg, Left: 3-->No effort against gravity, leg falls to bed immediately  6b. Motor Leg, Right: 1-->Drift, leg falls by the end of the 5-sec period but does not hit bed  7. Limb Ataxia: 0-->Absent  8. Sensory: 1-->Mild-to-moderate sensory loss, patient feels pinprick is less sharp or is dull on the affected side, or there is a loss of superficial pain with pinprick, but patient is aware of being touched  9. Best Language: 2-->Severe aphasia, all communication is through fragmentary expression, great need for inference, questioning, and guessing by the listener. Range of information that can be exchanged is limited, listener carries burden of. . . (see row details)  10. Dysarthria: 2-->Severe dysarthria, patients speech is so slurred as to be unintelligible in the absence of or out of proportion to any dysphasia, or is mute/anarthric  11. Extinction and Inattention (formerly Neglect): 2-->Profound otis-inattention/extinction more than 1 modality    Total (NIH Stroke Scale): 24     CT Angiogram Neck    Result Date: 10/31/2022  Abrupt cut off of the distal right M1 segment. There is corresponding perfusion abnormality in the right MCA territory with 6 mL of core infarct and 97 mL of salvageable ischemic penumbra.  There are multiple scattered solid nodules in the lung apices measuring up to 7 mm. Consider CT of the chest for further assessment and to exclude pulmonary metastases. At minimum, per Fleischner criteria, CT follow-up in 3-6 months is recommended, if in line with goals of care.  Findings discussed with stroke navigator by Dr. Mendez  Giles via telephone at 2:30 PM 10/31/2022. The patient was in transit to Cath Lab at time of conversation.  This report was finalized on 10/31/2022 2:50 PM by Andrea Skaggs MD.       CT Head Without Contrast Stroke Protocol    Result Date: 11/1/2022  1. Acute infarct in the right middle cerebral artery territory involving the right temporal lobe, right insula extending to the right parietal region. 2. Thrombus in the right middle cerebral artery. 3. Severe changes small vessel ischemic disease of indeterminate age, presumably mostly chronic. Volume loss. Atherosclerosis. To chronic infarct left occipital lobe. Report called to ARNAUD DILL at 11/1/2022 1:45 AM Electronically signed by:  Isaiah Nuñez M.D.  11/1/2022 12:05 AM Mountain Time    CT Head Without Contrast Stroke Protocol    Result Date: 10/31/2022  1.  There are white matter changes involving both cerebral hemispheres which could reflect more chronic small vessel ischemic change. 2.  Evidence for old insult left occipital lobe. 3.  Atherosclerotic change 4.  Hypodense area within the vidal which seems like a change and could relate to interval ischemia with respect to this area. MRI may be of benefit to reassess this patient.  Study was reviewed on the CT scan monitor and discussed with the stroke team navigator at 1:46 PM.  This report was finalized on 10/31/2022 2:23 PM by Rai Rivera MD.      CT Angiogram Head w AI Analysis of LVO    Result Date: 10/31/2022  Abrupt cut off of the distal right M1 segment. There is corresponding perfusion abnormality in the right MCA territory with 6 mL of core infarct and 97 mL of salvageable ischemic penumbra.  There are multiple scattered solid nodules in the lung apices measuring up to 7 mm. Consider CT of the chest for further assessment and to exclude pulmonary metastases. At minimum, per Fleischner criteria, CT follow-up in 3-6 months is recommended, if in line with goals of care.  Findings  discussed with stroke navigator by Dr. Andrea Skaggs via telephone at 2:30 PM 10/31/2022. The patient was in transit to Cath Lab at time of conversation.  This report was finalized on 10/31/2022 2:50 PM by Andrea Skaggs MD.      CT CEREBRAL PERFUSION WITH & WITHOUT CONTRAST    Result Date: 10/31/2022  Abrupt cut off of the distal right M1 segment. There is corresponding perfusion abnormality in the right MCA territory with 6 mL of core infarct and 97 mL of salvageable ischemic penumbra.  There are multiple scattered solid nodules in the lung apices measuring up to 7 mm. Consider CT of the chest for further assessment and to exclude pulmonary metastases. At minimum, per Fleischner criteria, CT follow-up in 3-6 months is recommended, if in line with goals of care.  Findings discussed with stroke navigator by Dr. Andrea Skaggs via telephone at 2:30 PM 10/31/2022. The patient was in transit to Cath Lab at time of conversation.  This report was finalized on 10/31/2022 2:50 PM by Andrea Skaggs MD.        Patient is not a candidate for IV TNK secondary to LKW >4.5H  Patient is not a candidate for emergent endovascular intervention per Dr. Rees    Discussed case with Dr. Rees who reviewed patient's CT scan and stated that patient is not a surgical candidate and recommended goals of care discussion with patient's .  After multiple attempts patients spouse was reached via telephone, change in patient's status was relayed to  with the assistance of an  and Dr. Carney ([facility] medicine).  Patient's  stated that he would prefer that this information was discussed with the patient's caregiver, Diamante Jeter ([phone]) who could better help him understand.  Patient's caregiver was updated via telephone on patient's current condition, the plan at this time is for both patient's spouse and patient's caregiver to come to the hospital in person in the morning to discuss goals of care.  Patient is unable to safely swallow medications at this time, in discussion with the patient's caregiver a feeding tube was refused at this time. A palliative care consult will be placed for the morning to aid in discussion of goals of care. Per Dr. Rees patient also received a fluid bolus overnight.      Thank you for allowing us to participate in the care of this patient, stroke neurology will continue to follow. Please call with questions/concerns.     JOSE LUIS Sun

## 2022-11-01 NOTE — CASE MANAGEMENT/SOCIAL WORK
Continued Stay Note  [facility]     Patient Name: Angeles Hoffman  MRN: [id number]  Today's Date: 11/1/2022    Admit Date: 10/31/2022    Plan: Palliative services   Discharge Plan     Row Name 11/01/22 1542                          Row Name 11/01/22 1537       Plan    Plan Comments MSW notified by Palliative that pt's  was asking about information on guardianship to be able to pay their bills. MSW spoke with pt's caregiver, Diamante, at bedside and she explained that she and pt's  tried to go to pt's bank to withdrawal money to pay their bills, however the bank woul dnot allow this since pt's  is not on the bank account. Bank advised pt's  to get POA or guardianship in order to be able to withdrawal any money. Caregiver reqested MSW call the [employer] to see about any other alternatives. MSW had also provided the information on how to apply for guardianship. MSW called [email or web address] in [place] to inquire about any alternatives. [employer] staff reports that nothing can be done if name not on account unless POA or guardianship obtained. MSW assisted physician in writing a letter stating pt needed emergency guardianship. MSW placed the letter in pt's chartlett for physician to sign when able. MSW udpated pt's caregiver.               Discharge Codes    No documentation.               Expected Discharge Date and Time     Expected Discharge Date Expected Discharge Time    Nov 4, 2022             CONSTANCE Valdes

## 2022-11-01 NOTE — SIGNIFICANT NOTE
STAT paged MD Pena and DO cary to notify them of patients decline. Verbal orders received from DO cary, see mar. Per MD Pena, hospice will be consulted tomorrow.

## 2022-11-01 NOTE — SIGNIFICANT NOTE
Patient had not been given PO Plavix on previous shift d/t NPO status. Benefit>risk in setting of acute stroke for administration of Plavix, loading Plavix 300 mg dose has been re-ordered.    Patient also had not received ASA, requested that primary RN administer ASA suppository.     Please call with questions or concerns, thank you.    JOSE LUIS Sun

## 2022-11-01 NOTE — SIGNIFICANT NOTE
11/01/22 0925   SLP Deferred Reason   SLP Deferred Reason Routine  (Consults received per stroke protocol & chart reviewed. Not appropriate for SLP @ this time. Will f/u pending POC/GOC.)

## 2022-11-01 NOTE — PLAN OF CARE
Goal Outcome Evaluation:  Plan of Care Reviewed With: spouse, family (pt unable to participate)        Progress: no change  Outcome Evaluation: Palliative consult for support to pt and family and GOC/ACP. No ACP doc on file, spouse Rashad is NOK. Caregiver/family friend, Diamante, able to translate English/[place], but stated that her [place] vocabulary is limited in medical terminology; recommend utilization of  on wheels for medical and GOC discussions. Pt nodded when spouse asked if her head hurt; observable grimace, occasional groan, body stiffness noted. Reviewed pain management orders with family present, encouraged to utilize low-dose morphine for relief of pain. Long discussions with spouse (via  and Diamante); code status adjusted per discussion with Dr. Helms today. Spiritual Care consult for additional support. Palliatie following for continued support in GOC/POC discussion.    [facility] Palliative IDT meeting: VAN Steven RN, CHPN; GAURAV Shields, CSW; MERLY Alejandro hospitalsW, [facility]; JONAH Hou, APRN; MARCO ANTONIO Helms, ; GAURAV Caballero, CHARITY    Problem: Palliative Care  Goal: Enhanced Quality of Life  Outcome: Ongoing, Progressing  Intervention: Promote Advance Care Planning  Flowsheets (Taken 11/1/2022 1446)  Life Transition/Adjustment:   decision-making facilitated   palliative care discussed   palliative care initiated  Intervention: Maximize Comfort  Flowsheets (Taken 11/1/2022 1446)  Pain Management Interventions: pain management plan reviewed with patient/caregiver  Intervention: Optimize Function  Flowsheets (Taken 11/1/2022 1446)  Fatigue Management: frequent rest breaks encouraged  Sleep/Rest Enhancement: family presence promoted  Intervention: Optimize Psychosocial Wellbeing  Flowsheets (Taken 11/1/2022 1446)  Supportive Measures:   active listening utilized   decision-making supported   goal-setting facilitated   positive reinforcement provided   problem-solving facilitated   relaxation techniques  promoted  Spiritual Activities Assistance: (Spiritual Care consult) other (see comments)  Family/Support System Care:   caregiver stress acknowledged   family care conference arranged   involvement promoted   presence promoted   self-care encouraged   support provided

## 2022-11-01 NOTE — PLAN OF CARE
Goal Outcome Evaluation:   Pt requires non-rebreather mask to maintain SATs above 90%, MD Pena and DO cary aware.Pt remains non-verbal. Oral care preformed frequently this shift. WOC consult placed for speciality bed. Spouse at bedside and supportive of patient.

## 2022-11-01 NOTE — CASE MANAGEMENT/SOCIAL WORK
Continued Stay Note  [facility]     Patient Name: Angeles Hoffman  MRN: [id number]  Today's Date: 11/1/2022    Admit Date: 10/31/2022    Plan: Palliative services   Discharge Plan     Row Name 11/01/22 1542       Plan    Plan Palliative services    Plan Comments No interpretor currently with patient, spouse at bedside, patient on high flow oxygen currently. GOC being addressed with palliative.  helping with emergency guardianship.     Final Discharge Disposition Code 30 - still a patient    Row Name        Plan    Plan Comments                Discharge Codes    No documentation.               Expected Discharge Date and Time     Expected Discharge Date Expected Discharge Time    Nov 4, 2022             Yumiko Sanabria RN

## 2022-11-01 NOTE — SIGNIFICANT NOTE
Patient was found by this nurse to be difficult to arouse and with incomprehensible speech around 0120. NIH assessment 21 at 0125 which was significantly greater than the initial NIH 5 at 2200. Code Stroke initiated. Stroke team arrived and STAT CT of head was performed. 500ml NS bolus ordered and given. Patient's spouse notified of major status change, as well as patient's caregiver upon spouse's request. Hospitalist and stroke navigator APRN attempted to discuss plan of care including code status with patient's spouse, but conversation was difficult over the phone due to language barrier and difficulty hearing.  on wheels utilized. Caregiver indicated the plan of care and patient goals would be discussed with care team, patient's spouse, and caregiver later this AM. Patient sleeping between care. No further changes at this time.

## 2022-11-01 NOTE — CONSULTS
Order noted for diabetes education per stroke protocol. A1c 9.7% with history of diabetes. GCS 10 and inappropriate for education at this time. Diabetes will follow.

## 2022-11-02 PROBLEM — I63.9 CVA (CEREBRAL VASCULAR ACCIDENT) (HCC): Status: ACTIVE | Noted: 2022-01-01

## 2022-11-02 NOTE — NURSING NOTE
[facility] team consulted for specialty mattress    WOC RN called and spoke with bedside RN who stated that the  would be being transferred to hospice today but should stay within the facility.  Ordered patient [place] with low air loss mattress.    At time of charting this note noted that patient had been discharged from the facility.

## 2022-11-02 NOTE — PROGRESS NOTES
Continued Stay Note  [facility]     Patient Name: Angeles Hoffman  MRN: [id number]  Today's Date: 11/2/2022    Admit Date: 11/2/2022    Plan: Inpatient hospice   Discharge Plan     Row Name 11/02/22 1156       Plan    Plan Inpatient hospice    Plan Comments Hospice team met with family at bedside and discussed inpatient hospice.  Used  line for  of patient as his primary language is [place].  Dr. Pena aware of discharge/readmit to hospice.  Admission paperwork signed by spouse.  Admission approved per Aubrie AMAYA.  Coordinated care with staff nurseAlessia.  For further assist, call ext [phone].    Final Discharge Disposition Code 51 - hospice medical facility               Discharge Codes    No documentation.                     Cayla Leach RN

## 2022-11-02 NOTE — PLAN OF CARE
Problem: Adult Inpatient Plan of Care  Goal: Plan of Care Review  Outcome: Ongoing, Progressing  Flowsheets (Taken 11/2/2022 1804)  Progress: declining  Plan of Care Reviewed With: patient  Outcome Evaluation: Resting well. QUIANA Morphine helped respiratory load lessen. Fx at BS. Non English speaking.  needed for medical explanations. Turning pt. q4. Continue care. Nonrebreathor per family request at this time. Specialty bed ordered per previous floor. Bed arrived before 5pm.   Goal Outcome Evaluation:  Plan of Care Reviewed With: patient        Progress: declining  Outcome Evaluation: Resting well. QUIANA Morphine helped respiratory load lessen. Fx at BS. Non English speaking.  needed for medical explanations. Turning pt. q4. Continue care. Nonrebreathor per family request at this time. Specialty bed ordered per previous floor. Bed arrived before 5pm.

## 2022-11-02 NOTE — CONSULTS
Diabetes education following. GCS 6 per chart review. Inappropriate for education at this time. Will continue to follow.

## 2022-11-02 NOTE — H&P
Hospice History and Physical     Patient Name:  Angeles Hoffman   : 1940   Sex: female    Patient Care Team:  Provider, No Known as PCP - General    Code Status: DNR/DNI    Subjective      82 y.o. female with a PMH significant for HTN, DM, HLD, CAD/CABG, chronic cough, and CVA.  Patient presented to hospital on 10/31 with c/o weakness, slurred speech, and facial droop. Workup revealed R MCA CVA with ischemic penumbra. Neurosurgery was consulted and explained the risk vs benefits to the patient regarding surgical intervention and expected outcome. Per note, they were concerned given the patient's advanced age and relatively poor baseline status with MCA occlusion. Ultimately, the patient made the decision to not purse thrombectomy and proceed with a more palliative plan of care. During hospitalization, she continued to decline and became non-communicative and required increased oxygen requirements. Palliative care had GOC conversations with the patient's family who made the decision to make her DNR/DNI and no escalation of care. Inpatient hospice was consulted d/t increasing symptoms. Hospice team met with family on  with the assistance of [place]  via iPad (#[id number]) and family are in agreement with pursing a hospice plan of care.     Review of Systems  Review of Systems   Unable to perform ROS: Acuity of condition       History  Past Medical History:   Diagnosis Date   • Diabetes mellitus (HCC)    • Elevated cholesterol    • Hypertension      No past surgical history on file.  Current Facility-Administered Medications   Medication Dose Route Frequency Provider Last Rate Last Admin   • acetaminophen (TYLENOL) suppository 650 mg  650 mg Rectal Q4H PRN Bita Hall MD       • bisacodyl (DULCOLAX) suppository 10 mg  10 mg Rectal Daily PRN Chari Gastelum APRN       • furosemide (LASIX) injection 20 mg  20 mg Intravenous Q6H PRN Bita Hall MD       • haloperidol lactate (HALDOL)  injection 1 mg  1 mg Intramuscular Q6H PRN Bita Hall MD       • ketorolac (TORADOL) injection 15 mg  15 mg Intravenous Q6H PRN Chari Gastelum APRN       • LORazepam (ATIVAN) injection 0.5 mg  0.5 mg Intravenous Q4H PRN Bita Hall MD       • morphine injection 2 mg  2 mg Intravenous Q6H Bita Hall MD       • morphine injection 2 mg  2 mg Intravenous Q1H PRN Bita Hall MD        Or   • morphine injection 4 mg  4 mg Intravenous Q1H PRN Bita Hall MD       • palliative care oral rinse   Mouth/Throat PRN Chari Gastelum APRN       • polyvinyl alcohol (LIQUIFILM) 1.4 % ophthalmic solution 1 drop  1 drop Both Eyes Q30 Min PRN Chari Gastelum APRN       • scopolamine patch 1 mg/72 hr  1 patch Transdermal Q72H PRN Chari Gastelum APRN       • scopolamine patch 1 mg/72 hr  1 patch Transdermal Q72H Bita Hall MD            •  acetaminophen  •  bisacodyl  •  furosemide  •  haloperidol lactate  •  ketorolac  •  LORazepam  •  Morphine **OR** Morphine  •  palliative care oral rinse  •  polyvinyl alcohol  •  Scopolamine  No Known Allergies  No family history on file.  Social History     Socioeconomic History   • Marital status:    Tobacco Use   • Smoking status: Never   Vaping Use   • Vaping Use: Never used   Substance and Sexual Activity   • Alcohol use: Never   • Drug use: Never   • Sexual activity: Defer       Objective     Vital Signs  Temp:  [98.1 °F (36.7 °C)-98.5 °F (36.9 °C)] 98.5 °F (36.9 °C)  Heart Rate:  [] 98  Resp:  [22-40] 40  BP: (133-157)/(65-76) 133/65    PPS: 10%    Physical Exam  Constitutional:       Comments: Ill-appearing, unresponsive to visit/exam   HENT:      Mouth/Throat:      Mouth: Mucous membranes are moist.   Eyes:      Comments: Eyes closed   Cardiovascular:      Rate and Rhythm: Regular rhythm. Tachycardia present.      Pulses: Normal pulses.   Pulmonary:      Effort: Respiratory distress present.      Breath sounds: Rhonchi present.       Comments: tachypnea  Abdominal:      General: Bowel sounds are normal.      Palpations: Abdomen is soft.   Musculoskeletal:      Comments: Pulses palpable, generalized edema noted   Skin:     Comments: Warm, diaphoretic    Neurological:      Comments: unresponsive to exam, no tremors         Results Reviewed:  LAB RESULTS:      Lab 11/01/22  0310 10/31/22  1914 10/31/22  1353 10/31/22  1350 10/31/22  1349   WBC 6.27  --   --   --  5.31   HEMOGLOBIN 13.8  --   --   --  13.5   HEMOGLOBIN, POC  --   --   --  14.6  --    HEMATOCRIT 44.8  --   --   --  44.1   HEMATOCRIT POC  --   --   --  43  --    PLATELETS 125*  --   --   --  132*   NEUTROS ABS 4.63  --   --   --  3.40   IMMATURE GRANS (ABS) 0.02  --   --   --  0.01   LYMPHS ABS 1.20  --   --   --  1.38   MONOS ABS 0.35  --   --   --  0.40   EOS ABS 0.04  --   --   --  0.10   MCV 94.7  --   --   --  95.2   PROTIME  --   --  12.6*  --  12.2   APTT  --   --   --   --  33.4*   HEPARIN ANTI-XA 0.10* 0.10*  --   --   --          Lab 11/01/22  0310 10/31/22  1350 10/31/22  1349   SODIUM  --   --  139   POTASSIUM  --   --  4.4   CHLORIDE  --   --  103   CO2  --   --  28.0   ANION GAP  --   --  8.0   BUN  --   --  18   CREATININE  --  1.00 0.93   EGFR  --  55.7* 60.7   GLUCOSE  --   --  308*   CALCIUM  --   --  8.6   HEMOGLOBIN A1C 9.70*  --   --          Lab 10/31/22  1349   TOTAL PROTEIN 7.0   ALBUMIN 3.80   GLOBULIN 3.2   ALT (SGPT) 16   AST (SGOT) 18   BILIRUBIN 0.3   ALK PHOS 86         Lab 10/31/22  1353 10/31/22  1349   TROPONIN T  --  0.019   PROTIME 12.6* 12.2   INR 1.1 0.92         Lab 11/01/22 0310   CHOLESTEROL 133   LDL CHOL 51   HDL CHOL 66*   TRIGLYCERIDES 81             Brief Urine Lab Results  (Last result in the past 365 days)      Color   Clarity   Blood   Leuk Est   Nitrite   Protein   CREAT   Urine HCG        10/31/22 1438 Yellow   Clear   Negative   Negative   Negative   100 mg/dL (2+)                 Microbiology Results Abnormal     None            CVA  (cerebral vascular accident) (HCC)        Assessment/Plan:   1. Pain  - Schedule morphine 2mg IV/subq Q6H ATC  - Morphine 2-4mg IV/subq Q1H PRN  - Ketorolac 15mg IV/subq Q6H PRN    2. Dyspnea  - Morphine as above  - Continue oxygen via NRB, titrate for comfort if family wishes     3. Anxiety/ Agitation  - Lorazepam 0.5mg IV/subq Q4H PRN  - Haloperidol 1mg IV/subq Q6H PRN     4. Upper Airway congestion/ Respiratory secretions  - Furosemide 20mg IV/subq Q6H PRN  - Scopolamine patch Q72H  - Glycopyrrolate 0.2mg IV/subq Q4H PRN    5. Fever  - Ketorolac as above  - Acetaminophen 650mg [place] Q4H PRN    6. Dry MM  - Palliative care oral rinse PRN  - Liquifilm ophthalmic solution PRN    7. GOC  - DNR/DNI  - Comfort only plan of care  - Dispo: Home vs facility with home hospice if symptoms appropriately managed. However, patient is unlikely to survive admission.     Total Visit Time: 60min  Face to Face Time: 35min    Justification for care:  Patient meets criteria for acute in-patient care with required nursing assessment and interventions for symptoms with IV medications.      JOSE LUIS Boateng  [facility] Care Navigators  Hospice and Palliative Care Nurse Practitioner  11/02/22  12:14 EDT

## 2022-11-02 NOTE — PROGRESS NOTES
Continued Stay Note  [facility]     Patient Name: Angeles Hoffman  MRN: [id number]  Today's Date: 11/2/2022    Admit Date: 10/31/2022    Plan: Inpatient hospice   Discharge Plan     Row Name 11/02/22 1123       Plan    Plan Inpatient hospice    Plan Comments Hospice referral received, chart reviewed. Visit made to pt with Laura NASH, palliative care team. Pt non-responsive, pt's , daughter and caregivers present. Pt's 's primary language is [place], therefore the  line was used. Noted pt with labored breathing, discussed the use of morphine for the breathing. Daughter stated pt has not responded since receiving the last dose of morphine so family opted to hold the prns. ALMA DELIA AMAYA arrived during the visit, informed the family that pt's condition is declining and the decline is causing pt to be non-responsive, not the medication. Family agreeable for pt to receive the morphine, staff nurse administered the morphine. Teaching done on hospice services at both the hospital and at home. Addressed questions and concerns family had regarding home hospice. After further discussion the family opted for pt to remain at [facility] with hospice. Informed family the inpatient hospice team would be admitting pt today. At the end of the visit noted an improvement in pt's breathing. Please call [phone] if can be of further assistance.               Discharge Codes    No documentation.               Expected Discharge Date and Time     Expected Discharge Date Expected Discharge Time    Nov 4, 2022             Araceli Sullivan RN

## 2022-11-02 NOTE — DISCHARGE PLACEMENT REQUEST
"Elder Hoffman (82 y.o. Female)     Date of Birth   1940    Social Security Number       Address   [address] [place], [place] [address]    Home Phone   [phone]    MRN   [id number]       Zoroastrian   None    Marital Status                               Admission Date   10/31/22    Admission Type   Emergency    Admitting Provider   Joao Pena MD    Attending Provider   Joao Pena MD    Department, Room/Bed   [address], S315/1       Discharge Date       Discharge Disposition       Discharge Destination                               Attending Provider: Joao Pena MD    Allergies: No Known Allergies    Isolation: None   Infection: None   Code Status: No CPR    Ht: 157.5 cm (62\")   Wt: 102 kg (225 lb)    Admission Cmt: None   Principal Problem: Cerebrovascular accident (CVA), unspecified mechanism (HCC) [I63.9]                 Active Insurance as of 10/31/2022     Primary Coverage     Payor Plan Insurance Group Employer/Plan Group    [employer] MEDICARE REPLACEMENT [employer] DUAL COMPLETE MEDICARE REPLACEMENT [id number]     Payor Plan Address Payor Plan Phone Number Payor Plan Fax Number Effective Dates    [address] [phone]  4/1/2022 - None Entered    [facility] [address]       Subscriber Name Subscriber Birth Date Member ID       ELDER HOFFMAN 1940 [id number]                 Emergency Contacts      (Rel.) Home Phone Work Phone Mobile Phone    Yasmin (Spouse) [phone] -- --    Sara,Daughter (Daughter) -- -- [phone]    Celeste Welch (Daughter) -- -- [phone]            Emergency Contact Information     Name Relation Home Work Mobile    Yasmin Spouse [phone]      Sara,Daughter Daughter   [phone]    Celeste Welch Daughter   [phone]          Insurance Information                UNITED [employer] MEDICARE REPLACEMENT/[employer] DUAL COMPLETE MEDICARE REPLACEMENT " Phone: [phone]    Subscriber: Angeles Hoffman Subscriber#: [id number]    Group#: [id number] Precert#: --             History & Physical      Sherly, MD Christel at 10/31/22 5801              [facility]  HISTORY AND PHYSICAL    Patient Name: Angeles Hoffman  : 1940  MRN: [id number]  Primary Care Physician: Provider, No Known  Date of admission: 10/31/2022      Subjective    Subjective     Chief Complaint:  Speech difficulty and facial droop, weakness    HPI:  Angeles Hoffman is a 82 y.o. female with history of HTN, DM2, HL, CAD/CABG 2017, CHF, CVA 2018; thought to be on Xarelto but her full history is not known.  She lives at home with her , has a caregiver during the day.  At baseline she walks a few steps, is cognitively intact, feeds herself, uses prn oxygen, takes her medicines.      She was brought to [facility] ED by her caregiver who provided the history.  She was at baseline this morning but at 0900 developed acute speech difficulty and confusion as well as facial droop.   In ED, her BP was as high as 203/105.  Initial imaging showed abrupt cutoff of the distqal right M1 segment, and CTP showed a salvageoable ischemic penumbrar fo 97 ml.  She was taken to the cath lab.  However, she was somewhat hypoxic with sats in the 80s.  In the cath lab, the patient and  and neurosurgeon had further discussion and in the end the patient chose to forego heroic interventions and take a more palliative course and not proceed with attempted thrombectomy.   She was therefore started on ASA, Plavix and a heparin gtt.      Currently she says (through her caregiver who translates) taht she has a persistent frontal headache and is cold.  During the exam, she notes taht R leg is harder to lift than l leg.       ROS   Gen- No fevers, chills; was at baseline until this morning   CV- No known rhythm disordere, doesn't see cardiologist anymore, occas takes NTG for chest pain    Resp- No cough, dyspnea; has prn oxygen   GI- No N/V/D, abd pain; eats well and feeds herself  Endo - takes insulin at night   Neuro - history of CVA 2017; cognitively intact at baseline without dementia, but only walks a few steps .  Chronic hand pain which her caregiver calls neuropathy; no foot  pain.       All other systems reviewed and are negative.     Personal History     Past Medical History:   Diagnosis Date   • Diabetes mellitus (HCC)    • Elevated cholesterol    • Hypertension              History reviewed. No pertinent surgical history.    Family History: FH - could not obtain, pt unable to understand question    Social History:  reports that she has never smoked. She does not have any smokeless tobacco history on file. She reports that she does not drink alcohol and does not use drugs.  Social History     Social History Narrative   • Not on file   lives at home w , goes to adult day care     Medications:  Available home medication information reviewed.  Medications Prior to Admission   Medication Sig Dispense Refill Last Dose   • acetaminophen (TYLENOL) 325 MG tablet Take 650 mg by mouth Every 4 (Four) Hours As Needed for Mild Pain  or Fever.      • carvedilol (COREG) 6.25 MG tablet Take 6.25 mg by mouth 2 (two) times a day.      • Cholecalciferol (Vitamin D) 50 MCG (2000 UT) tablet Take 2,000 Units by mouth Daily.      • furosemide (LASIX) 20 MG tablet Take 20 mg by mouth Daily.      • glimepiride (AMARYL) 2 MG tablet Take 1 tablet by mouth 2 (two) times a day. 60 tablet 0    • hydrALAZINE (APRESOLINE) 10 MG tablet Take 10 mg by mouth 3 (Three) Times a Day.      • Insulin Glargine (LANTUS SOLOSTAR) 100 UNIT/ML injection pen Inject 15 Units under the skin into the appropriate area as directed Every Night for 30 days. 2 pen 0    • isosorbide mononitrate (IMDUR) 30 MG 24 hr tablet Take 1 tablet by mouth Daily. 30 tablet 0    • loperamide (IMODIUM) 2 MG capsule Take 2 mg by mouth 4 (Four) Times a  Day As Needed for Diarrhea.      • losartan (COZAAR) 25 MG tablet Take 25 mg by mouth Daily.      • metFORMIN (GLUCOPHAGE) 1000 MG tablet Take 1 tablet by mouth 2 (Two) Times a Day With Meals. 60 tablet 0    • predniSONE (DELTASONE) 20 MG tablet Take 20 mg by mouth Daily.      • rivaroxaban (XARELTO) 20 MG tablet Take 20 mg by mouth Daily With Dinner.      • rosuvastatin (CRESTOR) 40 MG tablet Take 40 mg by mouth Every Night.      • SITagliptin (JANUVIA) 50 MG tablet Take 1 tablet by mouth Daily. 30 tablet 0    • vitamin B-12 (CYANOCOBALAMIN) 1000 MCG tablet Take 1,000 mcg by mouth Daily.          Not on File    Objective    Objective     Vital Signs:   Temp:  [98 °F (36.7 °C)] 98 °F (36.7 °C)  Heart Rate:  [79-92] 92  Resp:  [20-22] 22  BP: (176-203)/() 203/105  Flow (L/min):  [2] 2  Total (NIH Stroke Scale): 7    Physical Exam   Constitutional: Awake, alert in bed, NAD but repeats that she feels cold, seems to talk well to  and understands questions  Eyes: PER no conjunctival injection  HENT: NCAT, mucous membranes moist  Neck: Supple,  trachea midline  Respiratory: Clear to auscultation bilaterally, nonlabored respirations on 2L nc oxygen   Cardiovascular: RRR, no murmurs,  palpable pedal pulses bilaterally 1+   Gastrointestinal: Positive bowel sounds, soft, nontender, nondistended  Musculoskeletal: No bilateral ankle edema, no clubbing or cyanosis to extremities  Psychiatric: Appropriate affect, cooperative  Neurologic: speech seems clear.  No facial droop noted.  R leg weaker than L.  Follows commands Skin: No rashes      Result Review:  I have personally reviewed the results from the time of this admission to 10/31/2022 15:59 EDT and agree with these findings:  [x]  Laboratory list / accordion  []  Microbiology  [x]  Radiology  []  EKG/Telemetry   []  Cardiology/Vascular   []  Pathology  [x]  Old records  []  Other:  Most notable findings include:  CTP with penumbra; CTA head with R MCA M!  Cutoff; creat 1.0. EKG pending         LAB RESULTS:      Lab 10/31/22  1353 10/31/22  1350 10/31/22  1349   WBC  --   --  5.31   HEMOGLOBIN  --   --  13.5   HEMOGLOBIN, POC  --  14.6  --    HEMATOCRIT  --   --  44.1   HEMATOCRIT POC  --  43  --    PLATELETS  --   --  132*   NEUTROS ABS  --   --  3.40   IMMATURE GRANS (ABS)  --   --  0.01   LYMPHS ABS  --   --  1.38   MONOS ABS  --   --  0.40   EOS ABS  --   --  0.10   MCV  --   --  95.2   PROTIME 12.6*  --  12.2   INR 1.1  --  0.92   APTT  --   --  33.4*         Lab 10/31/22  1350 10/31/22  1349   SODIUM  --  139   POTASSIUM  --  4.4   CHLORIDE  --  103   CO2  --  28.0   ANION GAP  --  8.0   BUN  --  18   CREATININE 1.00 0.93   EGFR 55.7* 60.7   GLUCOSE  --  308*   CALCIUM  --  8.6         Lab 10/31/22  1349   TOTAL PROTEIN 7.0   ALBUMIN 3.80   GLOBULIN 3.2   ALT (SGPT) 16   AST (SGOT) 18   BILIRUBIN 0.3   ALK PHOS 86         Lab 10/31/22  1349   TROPONIN T 0.019                 UA    Urinalysis 10/31/22 10/31/22    1438 1438   Squamous Epithelial Cells, UA  0-2   Specific Gravity, UA 1.035 (A)    Ketones, UA Negative    Blood, UA Negative    Leukocytes, UA Negative    Nitrite, UA Negative    RBC, UA  0-2   WBC, UA  0-2   Bacteria, UA  3+ (A)   (A) Abnormal value              Microbiology Results (last 10 days)     ** No results found for the last 240 hours. **          CT Angiogram Neck    Result Date: 10/31/2022  DATE OF EXAM: 10/31/2022 1:58 PM  PROCEDURE: CT CEREBRAL PERFUSION W WO CONTRAST-, CT ANGIOGRAM NECK-, CT ANGIOGRAM HEAD W AI ANALYSIS OF LVO-  INDICATIONS: Neuro deficit, acute, stroke suspected  COMPARISON: Concurrent noncontrast CT the head  TECHNIQUE: Routine transaxial cuts were obtained through the head without administration of contrast. Routine transaxial cuts were then obtained through the head following the intravenous administration of 115 mL of Isovue 370. Core blood volume, core blood flow, mean transit time, and Tmax images were obtained  utilizing the Rapid software protocol. A limited CT angiogram of the head was also performed to measure the blood vessel density.  CTA of the head and CTA of the neck was performed after the intravenous administration of above contrast. Reconstructed coronal and sagittal images were also obtained. In addition, a 3-D volume rendered image was obtained after post processing. Automated exposure control and iterative reconstruction methods were used. AI analysis of LVO was utilized for the CTA Head imaging portion of the study.  The radiation dose reduction device was turned on for each scan per the ALARA (As Low as Reasonably Achievable) protocol.  Stenosis measurement was performed by the NASCET or similar method.   FINDINGS: CT cerebral perfusion: There is a large perfusion abnormality in the right MCA territory with 6 mL of calculated core infarct and 97 mL of ischemic penumbra.  CTA HEAD: There is abrupt cut off/large vessel occlusion of the distal right M1 segment (series 7 image 307). No additional large vessel occlusion or flow-limiting stenosis is identified. There are scattered luminal irregularities of the anterior and posterior intracranial circulation likely on the basis of atherosclerosis. There is atherosclerosis of the bilateral carotid siphons and bilateral V4 segments. No evidence of aneurysm or dissection. The cerebral veins and dural venous sinuses are patent.  CTA NECK: Two-vessel aortic arch, normal variant. No abrupt cut off/large vessel occlusion, flow-limiting stenosis, dissection, or aneurysm. There is atherosclerosis of the bilateral carotid siphons without flow-limiting stenosis per NASCET criteria (less than 50%).  Extravascular findings: There are a few scattered solid noncalcified nodules in the lung apices measuring up to 7 mm in diameter. There are partially imaged median sternotomy wires. Homogeneous attenuation of the thyroid gland. No pharyngeal or laryngeal mass lesion. The bones are  demineralized. No acute or suspicious bony findings. The patient is edentulous.      Impression: Abrupt cut off of the distal right M1 segment. There is corresponding perfusion abnormality in the right MCA territory with 6 mL of core infarct and 97 mL of salvageable ischemic penumbra.  There are multiple scattered solid nodules in the lung apices measuring up to 7 mm. Consider CT of the chest for further assessment and to exclude pulmonary metastases. At minimum, per Fleischner criteria, CT follow-up in 3-6 months is recommended, if in line with goals of care.  Findings discussed with stroke navigator by Dr. Andrea Skaggs via telephone at 2:30 PM 10/31/2022. The patient was in transit to Cath Lab at time of conversation.  This report was finalized on 10/31/2022 2:50 PM by Andrea Skaggs MD.      CT Head Without Contrast Stroke Protocol    Result Date: 10/31/2022  DATE OF EXAM: 10/31/2022 1:48 PM  PROCEDURE: CT HEAD WO CONTRAST STROKE PROTOCOL-  INDICATIONS: Stroke, follow up  COMPARISON: July 22, 2021  TECHNIQUE: Routine transaxial and coronal reconstruction images were obtained through the head without the administration of contrast. Automated exposure control and iterative reconstruction methods were used.   FINDINGS: There is a hypodense area within the vidal which seems like a change from prior study and could relate to interval infarction within this area. There are periventricular and deep white matter changes which could reflect more chronic small vessel ischemic change. The patient had old left occipital infarct. The paranasal sinuses seem relatively clear. The mastoids do not appear unusual. Vascular calculation is noted along the wall the distal vertebral arteries. There some calcification along the wall of the cavernous portion of the internal carotid arteries as well.      Impression: 1.  There are white matter changes involving both cerebral hemispheres which could reflect more chronic small vessel  ischemic change. 2.  Evidence for old insult left occipital lobe. 3.  Atherosclerotic change 4.  Hypodense area within the vidal which seems like a change and could relate to interval ischemia with respect to this area. MRI may be of benefit to reassess this patient.  Study was reviewed on the CT scan monitor and discussed with the stroke team navigator at 1:46 PM.  This report was finalized on 10/31/2022 2:23 PM by Rai Rivera MD.      CT Angiogram Head w AI Analysis of LVO    Result Date: 10/31/2022  DATE OF EXAM: 10/31/2022 1:58 PM  PROCEDURE: CT CEREBRAL PERFUSION W WO CONTRAST-, CT ANGIOGRAM NECK-, CT ANGIOGRAM HEAD W AI ANALYSIS OF LVO-  INDICATIONS: Neuro deficit, acute, stroke suspected  COMPARISON: Concurrent noncontrast CT the head  TECHNIQUE: Routine transaxial cuts were obtained through the head without administration of contrast. Routine transaxial cuts were then obtained through the head following the intravenous administration of 115 mL of Isovue 370. Core blood volume, core blood flow, mean transit time, and Tmax images were obtained utilizing the Rapid software protocol. A limited CT angiogram of the head was also performed to measure the blood vessel density.  CTA of the head and CTA of the neck was performed after the intravenous administration of above contrast. Reconstructed coronal and sagittal images were also obtained. In addition, a 3-D volume rendered image was obtained after post processing. Automated exposure control and iterative reconstruction methods were used. AI analysis of LVO was utilized for the CTA Head imaging portion of the study.  The radiation dose reduction device was turned on for each scan per the ALARA (As Low as Reasonably Achievable) protocol.  Stenosis measurement was performed by the NASCET or similar method.   FINDINGS: CT cerebral perfusion: There is a large perfusion abnormality in the right MCA territory with 6 mL of calculated core infarct and 97 mL of ischemic  penumbra.  CTA HEAD: There is abrupt cut off/large vessel occlusion of the distal right M1 segment (series 7 image 307). No additional large vessel occlusion or flow-limiting stenosis is identified. There are scattered luminal irregularities of the anterior and posterior intracranial circulation likely on the basis of atherosclerosis. There is atherosclerosis of the bilateral carotid siphons and bilateral V4 segments. No evidence of aneurysm or dissection. The cerebral veins and dural venous sinuses are patent.  CTA NECK: Two-vessel aortic arch, normal variant. No abrupt cut off/large vessel occlusion, flow-limiting stenosis, dissection, or aneurysm. There is atherosclerosis of the bilateral carotid siphons without flow-limiting stenosis per NASCET criteria (less than 50%).  Extravascular findings: There are a few scattered solid noncalcified nodules in the lung apices measuring up to 7 mm in diameter. There are partially imaged median sternotomy wires. Homogeneous attenuation of the thyroid gland. No pharyngeal or laryngeal mass lesion. The bones are demineralized. No acute or suspicious bony findings. The patient is edentulous.      Impression: Abrupt cut off of the distal right M1 segment. There is corresponding perfusion abnormality in the right MCA territory with 6 mL of core infarct and 97 mL of salvageable ischemic penumbra.  There are multiple scattered solid nodules in the lung apices measuring up to 7 mm. Consider CT of the chest for further assessment and to exclude pulmonary metastases. At minimum, per Fleischner criteria, CT follow-up in 3-6 months is recommended, if in line with goals of care.  Findings discussed with stroke navigator by Dr. Andrea Skaggs via telephone at 2:30 PM 10/31/2022. The patient was in transit to Cath Lab at time of conversation.  This report was finalized on 10/31/2022 2:50 PM by Andrea Skaggs MD.      CT CEREBRAL PERFUSION WITH & WITHOUT CONTRAST    Result Date:  10/31/2022  DATE OF EXAM: 10/31/2022 1:58 PM  PROCEDURE: CT CEREBRAL PERFUSION W WO CONTRAST-, CT ANGIOGRAM NECK-, CT ANGIOGRAM HEAD W AI ANALYSIS OF LVO-  INDICATIONS: Neuro deficit, acute, stroke suspected  COMPARISON: Concurrent noncontrast CT the head  TECHNIQUE: Routine transaxial cuts were obtained through the head without administration of contrast. Routine transaxial cuts were then obtained through the head following the intravenous administration of 115 mL of Isovue 370. Core blood volume, core blood flow, mean transit time, and Tmax images were obtained utilizing the Rapid software protocol. A limited CT angiogram of the head was also performed to measure the blood vessel density.  CTA of the head and CTA of the neck was performed after the intravenous administration of above contrast. Reconstructed coronal and sagittal images were also obtained. In addition, a 3-D volume rendered image was obtained after post processing. Automated exposure control and iterative reconstruction methods were used. AI analysis of LVO was utilized for the CTA Head imaging portion of the study.  The radiation dose reduction device was turned on for each scan per the ALARA (As Low as Reasonably Achievable) protocol.  Stenosis measurement was performed by the NASCET or similar method.   FINDINGS: CT cerebral perfusion: There is a large perfusion abnormality in the right MCA territory with 6 mL of calculated core infarct and 97 mL of ischemic penumbra.  CTA HEAD: There is abrupt cut off/large vessel occlusion of the distal right M1 segment (series 7 image 307). No additional large vessel occlusion or flow-limiting stenosis is identified. There are scattered luminal irregularities of the anterior and posterior intracranial circulation likely on the basis of atherosclerosis. There is atherosclerosis of the bilateral carotid siphons and bilateral V4 segments. No evidence of aneurysm or dissection. The cerebral veins and dural venous  sinuses are patent.  CTA NECK: Two-vessel aortic arch, normal variant. No abrupt cut off/large vessel occlusion, flow-limiting stenosis, dissection, or aneurysm. There is atherosclerosis of the bilateral carotid siphons without flow-limiting stenosis per NASCET criteria (less than 50%).  Extravascular findings: There are a few scattered solid noncalcified nodules in the lung apices measuring up to 7 mm in diameter. There are partially imaged median sternotomy wires. Homogeneous attenuation of the thyroid gland. No pharyngeal or laryngeal mass lesion. The bones are demineralized. No acute or suspicious bony findings. The patient is edentulous.      Impression: Abrupt cut off of the distal right M1 segment. There is corresponding perfusion abnormality in the right MCA territory with 6 mL of core infarct and 97 mL of salvageable ischemic penumbra.  There are multiple scattered solid nodules in the lung apices measuring up to 7 mm. Consider CT of the chest for further assessment and to exclude pulmonary metastases. At minimum, per Fleischner criteria, CT follow-up in 3-6 months is recommended, if in line with goals of care.  Findings discussed with stroke navigator by Dr. Andera Skaggs via telephone at 2:30 PM 10/31/2022. The patient was in transit to Cath Lab at time of conversation.  This report was finalized on 10/31/2022 2:50 PM by Andrea Skaggs MD.      Aborted Cath Cath    Result Date: 10/31/2022  Aborted Case          Assessment & Plan   Assessment & Plan     There are no active hospital problems to display for this patient.    82 yr old [place] speaking woman with history of HTN, DM HL, CAD/CABG, chronic cough, CVA, possibly on Xarelto (data deficit), brought to ED with acute change in speech and new facial droop; found to have acute R MCA CVA with ischemic penumbra.  After discussion she decided not to proceed with thrombectomy.      CVA, R MCA  - abrupt cut-off of R MCA M1 segment  - seen by neurosurgeon  and neurologist; plan is ASA Plavix and heparin gtt as well as statin  - goal BP below 180   - ST OT PT    HTN  - goal below 180 while on hep gtt  - now systolic 160  - hold oral meds, awaiting speech therapist evaluation   - metoprolol IV 2.5mg q6h prn syst above 180     DM  - per med rec, she takes 3 oral meds and Lantus 15 units QHS  - SSI for now with Levemir 10 units QHS; adjust as needed    CAD/CABG  CHF  - home meds may include diuretic, imdur, BB, ARB,   - await speech eval to clear her for po meds, and confirmation of home meds   - echo pending    Data deficit re meds and illness  ? Chronic prednisone 20mg daily  ?chronic Xarelto 20mg daily  - [facility] records noted  - need to check w her PCP or usual pharmacy for updated list      Noncalcified nodules in lung apices   - max 7mm, seen on neck imaging  - if patient interested in pursuing, consider CT chest w contrast       DVT prophylaxis: hep gtt    CODE STATUS:  Full for now (per her caregiver)    There are no questions and answers to display.         Christel Dubois MD  10/31/22      Electronically signed by Christel Dubois MD at 10/31/22 6496

## 2022-11-02 NOTE — DISCHARGE SUMMARY
[facility] Medicine Services  DISCHARGE SUMMARY    Patient Name: Angeles Hoffman  : 1940  MRN: [id number]    Date of Admission: 10/31/2022  1:46 PM  Date of Discharge:  2022  Primary Care Physician: Provider, No Known    Consults     Date and Time Order Name Status Description    2022  4:49 AM Inpatient Palliative Care MD Consult Completed     10/31/2022  1:59 PM Inpatient Neurology Consult Stroke Completed           Hospital Course     Presenting Problem:   Cerebrovascular accident (CVA), unspecified mechanism (HCC) [I63.9]    Active Hospital Problems    Diagnosis  POA   • **Cerebrovascular accident (CVA), unspecified mechanism (HCC) [I63.9]  Yes      Resolved Hospital Problems   No resolved problems to display.          Hospital Course:  Angeles Hoffman is a 82 y.o. female with history of HTN, DM HL, CAD/CABG, chronic cough, CVA, possibly on Xarelto (data deficit), brought to ED with acute change in speech and new facial droop; found to have acute R MCA CVA.  Neuro endovascular procedure was deferred due to poor functional baseline, significant comorbidities, and patient unwillingness to go through the procedure. Patient had significant decline in neurological status; repeat CT head with evolution of right MCA infarct. Neurology encouraged Hospice consultation to family, who were open to idea. Hospice has evaluated and discussed with family, all are agreeable to inpatient hospice due to overall decline in medical condition. Will transfer to inpatient hospice today.       Discharge Follow Up Recommendations for outpatient labs/diagnostics:  NONE    Day of Discharge     HPI:   Patient unresponsive, multiple family members in room discussing POC with myself and hospice.  via iPad. Family has now decided to have patient to remain here as inpatient hospice.     Review of Systems  UTO due to unresponsiveness     Vital Signs:   Temp:  [98.1 °F (36.7 °C)-98.5 °F (36.9  °C)] 98.5 °F (36.9 °C)  Heart Rate:  [] 98  Resp:  [22-40] 40  BP: (133-157)/(65-76) 133/65  Flow (L/min):  [4] 4      Physical Exam:  Constitutional: obtunded   HENT: NCAT, mucous membranes moist  Respiratory: Bilateral rhonchi, moderately labored respiratory effort on NRB    Cardiovascular: RRR, no murmur   Gastrointestinal: Hypoactive BS, soft, nondistended  Musculoskeletal: No bilateral ankle edema  Psychiatric: ISREAL  Neurologic: ISREAL  Skin: left sided post aruicalar and neck bruising       Pertinent  and/or Most Recent Results     LAB RESULTS:      Lab 11/01/22  0310 10/31/22  1914 10/31/22  1353 10/31/22  1350 10/31/22  1349   WBC 6.27  --   --   --  5.31   HEMOGLOBIN 13.8  --   --   --  13.5   HEMOGLOBIN, POC  --   --   --  14.6  --    HEMATOCRIT 44.8  --   --   --  44.1   HEMATOCRIT POC  --   --   --  43  --    PLATELETS 125*  --   --   --  132*   NEUTROS ABS 4.63  --   --   --  3.40   IMMATURE GRANS (ABS) 0.02  --   --   --  0.01   LYMPHS ABS 1.20  --   --   --  1.38   MONOS ABS 0.35  --   --   --  0.40   EOS ABS 0.04  --   --   --  0.10   MCV 94.7  --   --   --  95.2   PROTIME  --   --  12.6*  --  12.2   APTT  --   --   --   --  33.4*   HEPARIN ANTI-XA 0.10* 0.10*  --   --   --          Lab 11/01/22  0310 10/31/22  1350 10/31/22  1349   SODIUM  --   --  139   POTASSIUM  --   --  4.4   CHLORIDE  --   --  103   CO2  --   --  28.0   ANION GAP  --   --  8.0   BUN  --   --  18   CREATININE  --  1.00 0.93   EGFR  --  55.7* 60.7   GLUCOSE  --   --  308*   CALCIUM  --   --  8.6   HEMOGLOBIN A1C 9.70*  --   --          Lab 10/31/22  1349   TOTAL PROTEIN 7.0   ALBUMIN 3.80   GLOBULIN 3.2   ALT (SGPT) 16   AST (SGOT) 18   BILIRUBIN 0.3   ALK PHOS 86         Lab 10/31/22  1353 10/31/22  1349   TROPONIN T  --  0.019   PROTIME 12.6* 12.2   INR 1.1 0.92         Lab 11/01/22  0310   CHOLESTEROL 133   LDL CHOL 51   HDL CHOL 66*   TRIGLYCERIDES 81             Brief Urine Lab Results  (Last result in the past 365 days)       Color   Clarity   Blood   Leuk Est   Nitrite   Protein   CREAT   Urine HCG        10/31/22 1438 Yellow   Clear   Negative   Negative   Negative   100 mg/dL (2+)               Microbiology Results (last 10 days)     ** No results found for the last 240 hours. **          Adult Transthoracic Echo Complete W/ Cont if Necessary Per Protocol (With Agitated Saline)    Result Date: 11/1/2022  •  Left ventricular systolic function is moderately decreased. Left ventricular ejection fraction appears to be 31 - 35%. •  Left ventricular wall thickness is consistent with mild concentric hypertrophy. •  Left ventricular diastolic dysfunction is noted. •  Left atrial volume is moderately increased. •  Estimated right ventricular systolic pressure from tricuspid regurgitation is normal (<35 mmHg). Calculated right ventricular systolic pressure from tricuspid regurgitation is 15 mmHg.     CT Angiogram Neck    Result Date: 10/31/2022  DATE OF EXAM: 10/31/2022 1:58 PM  PROCEDURE: CT CEREBRAL PERFUSION W WO CONTRAST-, CT ANGIOGRAM NECK-, CT ANGIOGRAM HEAD W AI ANALYSIS OF LVO-  INDICATIONS: Neuro deficit, acute, stroke suspected  COMPARISON: Concurrent noncontrast CT the head  TECHNIQUE: Routine transaxial cuts were obtained through the head without administration of contrast. Routine transaxial cuts were then obtained through the head following the intravenous administration of 115 mL of Isovue 370. Core blood volume, core blood flow, mean transit time, and Tmax images were obtained utilizing the Rapid software protocol. A limited CT angiogram of the head was also performed to measure the blood vessel density.  CTA of the head and CTA of the neck was performed after the intravenous administration of above contrast. Reconstructed coronal and sagittal images were also obtained. In addition, a 3-D volume rendered image was obtained after post processing. Automated exposure control and iterative reconstruction methods were used. AI  analysis of LVO was utilized for the CTA Head imaging portion of the study.  The radiation dose reduction device was turned on for each scan per the ALARA (As Low as Reasonably Achievable) protocol.  Stenosis measurement was performed by the NASCET or similar method.   FINDINGS: CT cerebral perfusion: There is a large perfusion abnormality in the right MCA territory with 6 mL of calculated core infarct and 97 mL of ischemic penumbra.  CTA HEAD: There is abrupt cut off/large vessel occlusion of the distal right M1 segment (series 7 image 307). No additional large vessel occlusion or flow-limiting stenosis is identified. There are scattered luminal irregularities of the anterior and posterior intracranial circulation likely on the basis of atherosclerosis. There is atherosclerosis of the bilateral carotid siphons and bilateral V4 segments. No evidence of aneurysm or dissection. The cerebral veins and dural venous sinuses are patent.  CTA NECK: Two-vessel aortic arch, normal variant. No abrupt cut off/large vessel occlusion, flow-limiting stenosis, dissection, or aneurysm. There is atherosclerosis of the bilateral carotid siphons without flow-limiting stenosis per NASCET criteria (less than 50%).  Extravascular findings: There are a few scattered solid noncalcified nodules in the lung apices measuring up to 7 mm in diameter. There are partially imaged median sternotomy wires. Homogeneous attenuation of the thyroid gland. No pharyngeal or laryngeal mass lesion. The bones are demineralized. No acute or suspicious bony findings. The patient is edentulous.      Abrupt cut off of the distal right M1 segment. There is corresponding perfusion abnormality in the right MCA territory with 6 mL of core infarct and 97 mL of salvageable ischemic penumbra.  There are multiple scattered solid nodules in the lung apices measuring up to 7 mm. Consider CT of the chest for further assessment and to exclude pulmonary metastases. At  minimum, per Fleischner criteria, CT follow-up in 3-6 months is recommended, if in line with goals of care.  Findings discussed with stroke navigator by Dr. Andrea Skaggs via telephone at 2:30 PM 10/31/2022. The patient was in transit to Cath Lab at time of conversation.  This report was finalized on 10/31/2022 2:50 PM by Andrea Skaggs MD.      XR Chest 1 View    Result Date: 10/31/2022  DATE OF EXAM: 10/31/2022 4:22 PM  PROCEDURE: XR CHEST 1 VW-  INDICATIONS: Stroke Protocol (Onset > 12 Hrs); I63.9-Cerebral infarction, unspecified; R53.1-Weakness  COMPARISON: July 22, 2021  TECHNIQUE: Single radiographic AP view of the chest was obtained.  FINDINGS: Sternotomy wires are noted. The heart looks enlarged. There are prominent markings right upper lobe. Whether this could be secondary to acute infiltrate reflect more of a confluence of shadows is uncertain. There are no pleural effusions. There is slight prominence of central pulmonary vasculature that could reflect some vascular congestion.      1.  Cardiomegaly. 2.  Prominence of markings right upper lobe. An inflammatory infectious process could not be excluded. 3.  There probably is some underlying pulmonary vascular congestion.  This report was finalized on 10/31/2022 4:57 PM by Rai Rivera MD.      CT Head Without Contrast Stroke Protocol    Result Date: 11/1/2022  EXAMINATION: CT HEAD WITHOUT CONTRAST DATE: 10/31/2022 11:32 PM  INDICATION: Stroke, follow up Code stroke, left side flaccid, facial droop, dysarthria, scan time: 0134  COMPARISON: CT head 10/31/2022.  TECHNIQUE:  Routine axial images through the head without contrast. Coronal reformations. Low-dose CT acquisition technique included one or more of the following options: 1. Automated exposure control, 2. Adjustment of mA and/or KV according to patient's size and/or 3. Use of iterative reconstruction. FINDINGS:  Intracranial contents: Hypodensity in the right temporal lobe, right insula extending to  the right parietal region measures approximately 3.3 x 7.9 cm. Hyperdense right middle cerebral artery from thrombus. Ventricular and cisternal spaces are prominent from volume loss. Severe periventricular and subcortical white matter hypodensities. Hypodensity/encephalomalacia left occipital lobe measures 1.5 x 2.3 cm. No dominant mass, midline shift, hydrocephalus, extra axial fluid collection or acute hemorrhage. No asymmetric hyperdensity of the proximal major cerebral arteries. Craniocervical junction is patent. Bones and extracranial soft tissues: Mild mucosal thickening ethmoid air cells, maxillary sinuses. Otherwise, Visualized paranasal sinuses and mastoid air cells are clear.  Skull is intact. Cataract surgery on the right. Degenerative changes temporomandibular joints. Calcifications distal internal carotid arteries and distal vertebral arteries.     1. Acute infarct in the right middle cerebral artery territory involving the right temporal lobe, right insula extending to the right parietal region. 2. Thrombus in the right middle cerebral artery. 3. Severe changes small vessel ischemic disease of indeterminate age, presumably mostly chronic. Volume loss. Atherosclerosis. To chronic infarct left occipital lobe. Report called to ARNAUD DILL at 11/1/2022 1:45 AM Electronically signed by:  Isaiah Nuñez M.D.  11/1/2022 12:05 AM Mountain Time    CT Head Without Contrast Stroke Protocol    Result Date: 10/31/2022  DATE OF EXAM: 10/31/2022 1:48 PM  PROCEDURE: CT HEAD WO CONTRAST STROKE PROTOCOL-  INDICATIONS: Stroke, follow up  COMPARISON: July 22, 2021  TECHNIQUE: Routine transaxial and coronal reconstruction images were obtained through the head without the administration of contrast. Automated exposure control and iterative reconstruction methods were used.   FINDINGS: There is a hypodense area within the vidal which seems like a change from prior study and could relate to interval infarction  within this area. There are periventricular and deep white matter changes which could reflect more chronic small vessel ischemic change. The patient had old left occipital infarct. The paranasal sinuses seem relatively clear. The mastoids do not appear unusual. Vascular calculation is noted along the wall the distal vertebral arteries. There some calcification along the wall of the cavernous portion of the internal carotid arteries as well.      1.  There are white matter changes involving both cerebral hemispheres which could reflect more chronic small vessel ischemic change. 2.  Evidence for old insult left occipital lobe. 3.  Atherosclerotic change 4.  Hypodense area within the vidal which seems like a change and could relate to interval ischemia with respect to this area. MRI may be of benefit to reassess this patient.  Study was reviewed on the CT scan monitor and discussed with the stroke team navigator at 1:46 PM.  This report was finalized on 10/31/2022 2:23 PM by Rai Rivera MD.      CT Angiogram Head w AI Analysis of LVO    Result Date: 10/31/2022  DATE OF EXAM: 10/31/2022 1:58 PM  PROCEDURE: CT CEREBRAL PERFUSION W WO CONTRAST-, CT ANGIOGRAM NECK-, CT ANGIOGRAM HEAD W AI ANALYSIS OF LVO-  INDICATIONS: Neuro deficit, acute, stroke suspected  COMPARISON: Concurrent noncontrast CT the head  TECHNIQUE: Routine transaxial cuts were obtained through the head without administration of contrast. Routine transaxial cuts were then obtained through the head following the intravenous administration of 115 mL of Isovue 370. Core blood volume, core blood flow, mean transit time, and Tmax images were obtained utilizing the Rapid software protocol. A limited CT angiogram of the head was also performed to measure the blood vessel density.  CTA of the head and CTA of the neck was performed after the intravenous administration of above contrast. Reconstructed coronal and sagittal images were also obtained. In addition, a  3-D volume rendered image was obtained after post processing. Automated exposure control and iterative reconstruction methods were used. AI analysis of LVO was utilized for the CTA Head imaging portion of the study.  The radiation dose reduction device was turned on for each scan per the ALARA (As Low as Reasonably Achievable) protocol.  Stenosis measurement was performed by the NASCET or similar method.   FINDINGS: CT cerebral perfusion: There is a large perfusion abnormality in the right MCA territory with 6 mL of calculated core infarct and 97 mL of ischemic penumbra.  CTA HEAD: There is abrupt cut off/large vessel occlusion of the distal right M1 segment (series 7 image 307). No additional large vessel occlusion or flow-limiting stenosis is identified. There are scattered luminal irregularities of the anterior and posterior intracranial circulation likely on the basis of atherosclerosis. There is atherosclerosis of the bilateral carotid siphons and bilateral V4 segments. No evidence of aneurysm or dissection. The cerebral veins and dural venous sinuses are patent.  CTA NECK: Two-vessel aortic arch, normal variant. No abrupt cut off/large vessel occlusion, flow-limiting stenosis, dissection, or aneurysm. There is atherosclerosis of the bilateral carotid siphons without flow-limiting stenosis per NASCET criteria (less than 50%).  Extravascular findings: There are a few scattered solid noncalcified nodules in the lung apices measuring up to 7 mm in diameter. There are partially imaged median sternotomy wires. Homogeneous attenuation of the thyroid gland. No pharyngeal or laryngeal mass lesion. The bones are demineralized. No acute or suspicious bony findings. The patient is edentulous.      Abrupt cut off of the distal right M1 segment. There is corresponding perfusion abnormality in the right MCA territory with 6 mL of core infarct and 97 mL of salvageable ischemic penumbra.  There are multiple scattered solid  nodules in the lung apices measuring up to 7 mm. Consider CT of the chest for further assessment and to exclude pulmonary metastases. At minimum, per Fleischner criteria, CT follow-up in 3-6 months is recommended, if in line with goals of care.  Findings discussed with stroke navigator by Dr. Andrea Skaggs via telephone at 2:30 PM 10/31/2022. The patient was in transit to Cath Lab at time of conversation.  This report was finalized on 10/31/2022 2:50 PM by Andrea Skaggs MD.      CT CEREBRAL PERFUSION WITH & WITHOUT CONTRAST    Result Date: 10/31/2022  DATE OF EXAM: 10/31/2022 1:58 PM  PROCEDURE: CT CEREBRAL PERFUSION W WO CONTRAST-, CT ANGIOGRAM NECK-, CT ANGIOGRAM HEAD W AI ANALYSIS OF LVO-  INDICATIONS: Neuro deficit, acute, stroke suspected  COMPARISON: Concurrent noncontrast CT the head  TECHNIQUE: Routine transaxial cuts were obtained through the head without administration of contrast. Routine transaxial cuts were then obtained through the head following the intravenous administration of 115 mL of Isovue 370. Core blood volume, core blood flow, mean transit time, and Tmax images were obtained utilizing the Rapid software protocol. A limited CT angiogram of the head was also performed to measure the blood vessel density.  CTA of the head and CTA of the neck was performed after the intravenous administration of above contrast. Reconstructed coronal and sagittal images were also obtained. In addition, a 3-D volume rendered image was obtained after post processing. Automated exposure control and iterative reconstruction methods were used. AI analysis of LVO was utilized for the CTA Head imaging portion of the study.  The radiation dose reduction device was turned on for each scan per the ALARA (As Low as Reasonably Achievable) protocol.  Stenosis measurement was performed by the NASCET or similar method.   FINDINGS: CT cerebral perfusion: There is a large perfusion abnormality in the right MCA territory with 6  mL of calculated core infarct and 97 mL of ischemic penumbra.  CTA HEAD: There is abrupt cut off/large vessel occlusion of the distal right M1 segment (series 7 image 307). No additional large vessel occlusion or flow-limiting stenosis is identified. There are scattered luminal irregularities of the anterior and posterior intracranial circulation likely on the basis of atherosclerosis. There is atherosclerosis of the bilateral carotid siphons and bilateral V4 segments. No evidence of aneurysm or dissection. The cerebral veins and dural venous sinuses are patent.  CTA NECK: Two-vessel aortic arch, normal variant. No abrupt cut off/large vessel occlusion, flow-limiting stenosis, dissection, or aneurysm. There is atherosclerosis of the bilateral carotid siphons without flow-limiting stenosis per NASCET criteria (less than 50%).  Extravascular findings: There are a few scattered solid noncalcified nodules in the lung apices measuring up to 7 mm in diameter. There are partially imaged median sternotomy wires. Homogeneous attenuation of the thyroid gland. No pharyngeal or laryngeal mass lesion. The bones are demineralized. No acute or suspicious bony findings. The patient is edentulous.      Abrupt cut off of the distal right M1 segment. There is corresponding perfusion abnormality in the right MCA territory with 6 mL of core infarct and 97 mL of salvageable ischemic penumbra.  There are multiple scattered solid nodules in the lung apices measuring up to 7 mm. Consider CT of the chest for further assessment and to exclude pulmonary metastases. At minimum, per Fleischner criteria, CT follow-up in 3-6 months is recommended, if in line with goals of care.  Findings discussed with stroke navigator by Dr. Andrea Skaggs via telephone at 2:30 PM 10/31/2022. The patient was in transit to Cath Lab at time of conversation.  This report was finalized on 10/31/2022 2:50 PM by Andrea Skaggs MD.      Aborted Cath Cath    Result  Date: 10/31/2022  Aborted Case              Results for orders placed during the hospital encounter of 10/31/22    Adult Transthoracic Echo Complete W/ Cont if Necessary Per Protocol (With Agitated Saline)    Interpretation Summary  •  Left ventricular systolic function is moderately decreased. Left ventricular ejection fraction appears to be 31 - 35%.  •  Left ventricular wall thickness is consistent with mild concentric hypertrophy.  •  Left ventricular diastolic dysfunction is noted.  •  Left atrial volume is moderately increased.  •  Estimated right ventricular systolic pressure from tricuspid regurgitation is normal (<35 mmHg). Calculated right ventricular systolic pressure from tricuspid regurgitation is 15 mmHg.        Discharge Details        Discharge Medications      ASK your doctor about these medications      Instructions Start Date   acetaminophen 325 MG tablet  Commonly known as: TYLENOL   650 mg, Oral, Every 4 Hours PRN      carvedilol 6.25 MG tablet  Commonly known as: COREG   6.25 mg, Oral, 2 times daily      furosemide 20 MG tablet  Commonly known as: LASIX   20 mg, Oral, Daily      glimepiride 2 MG tablet  Commonly known as: AMARYL   2 mg, Oral, 2 times daily      hydrALAZINE 10 MG tablet  Commonly known as: APRESOLINE   10 mg, Oral, 3 Times Daily      Insulin Glargine 100 UNIT/ML injection pen  Commonly known as: LANTUS SOLOSTAR   15 Units, Subcutaneous, Nightly      isosorbide mononitrate 30 MG 24 hr tablet  Commonly known as: IMDUR   30 mg, Oral, Daily      loperamide 2 MG capsule  Commonly known as: IMODIUM   2 mg, Oral, 4 Times Daily PRN      losartan 25 MG tablet  Commonly known as: COZAAR   25 mg, Oral, Daily      metFORMIN 1000 MG tablet  Commonly known as: GLUCOPHAGE   1,000 mg, Oral, 2 Times Daily With Meals      predniSONE 20 MG tablet  Commonly known as: DELTASONE   20 mg, Oral, Daily      rivaroxaban 20 MG tablet  Commonly known as: XARELTO   20 mg, Oral, Daily With Dinner       rosuvastatin 40 MG tablet  Commonly known as: CRESTOR   40 mg, Oral, Nightly      SITagliptin 50 MG tablet  Commonly known as: JANUVIA   50 mg, Oral, Daily      vitamin B-12 1000 MCG tablet  Commonly known as: CYANOCOBALAMIN   1,000 mcg, Oral, Daily      Vitamin D 50 MCG (2000 UT) tablet   2,000 Units, Oral, Daily             No Known Allergies      Discharge Disposition:  Hospice/Medical Facility ([facility])    Diet:  Hospital:  No active diet order           CODE STATUS:    Code Status and Medical Interventions:   Ordered at: 11/01/22 1313     Medical Intervention Limits:    NO cardioversion    NO intubation (DNI)    NO dialysis    NO vasopressors    NO blood products    NO artificial nutrition     Code Status (Patient has no pulse and is not breathing):    No CPR (Do Not Attempt to Resuscitate)     Medical Interventions (Patient has pulse or is breathing):    Limited Support     Comments:    no bipap, no icu       No future appointments.              Rafaela Rodriguez, APRN  11/02/22      Time Spent on Discharge:  I spent  45  minutes on this discharge activity which included: face-to-face encounter with the patient, reviewing the data in the system, coordination of the care with the nursing staff as well as consultants, documentation, and entering orders.

## 2022-11-03 NOTE — CASE MANAGEMENT/SOCIAL WORK
Continued Stay Note   Natan     Patient Name: Angeles Hoffman  MRN: [id number]  Today's Date: 11/3/2022    Admit Date: 2022    Plan: Hospice   Discharge Plan     Row Name 22 1404       Plan    Plan Hospice    Patient/Family in Agreement with Plan yes    Plan Comments Transitioned to inpatient Hospice on 22,  11/3/22.  Kayla Kraft, Ext. [phone]    Final Discharge Disposition Code 41 -  in medical facility               Discharge Codes    No documentation.               Expected Discharge Date and Time     Expected Discharge Date Expected Discharge Time    2022             CONSTANCE Kirby

## 2022-11-03 NOTE — SIGNIFICANT NOTE
Exam confirms with auscultation zero audible heart tones and zero audible respirations. Ms.Anna Hoffman was pronounced dead at 2255.  MD notified by Patient's RN.    Becca Benítez RN  Clinical House Supervisor  11/2/2022 23:38 EDT

## 2022-11-03 NOTE — DISCHARGE SUMMARY
Date of Death: 11/2/2022  Time of Death: 22:55pm    Presenting Problem/History of Present Illness    CVA (cerebral vascular accident) (HCC)    Hospital Course    83yo female admitted to inpatient hospice with diagnosis of CVA for symptom management. Medications were available throughout admission for symptom management and comfort.  Patient continued to decline after admission as expected ultimately to her death on November 2, 2022 at 22:55pm.  Patient was pronounced dead by Clinical House Supervisor.  Spiritual and psychosocial support were available to patient and family throughout admission.  Patient's remains were released to [facility] per protocol.       Social History:  Social History     Tobacco Use   • Smoking status: Never   • Smokeless tobacco: Not on file   Substance Use Topics   • Alcohol use: Never         Consults:   Consults     Date and Time Order Name Status Description    11/1/2022  4:49 AM Inpatient Palliative Care MD Consult Completed     10/31/2022  1:59 PM Inpatient Neurology Consult Stroke Completed               Chari Oriana, APRN  [facility]  Hospice and Palliative Care Nurse Practitioner  11/03/22  08:51 EDT

## (undated) DEVICE — LIMB HOLDER, WRIST/ANKLE: Brand: DEROYAL

## (undated) DEVICE — RADIFOCUS GLIDEWIRE: Brand: GLIDEWIRE

## (undated) DEVICE — INTRO SHEATH ENGAGE W/50 GW .038 8F12

## (undated) DEVICE — STPCK 3/WY HP M/RA W/OFF/HNDL 1050PSI STRL

## (undated) DEVICE — ST ACC MICROPUNCTURE .018 TRANSLSS/SS/TP 5F/10CM 21G/7CM

## (undated) DEVICE — STPCK LP 1WY RA 200PSI

## (undated) DEVICE — RADIFOCUS TORQUE DEVICE MULTI-TORQUE VISE: Brand: RADIFOCUS TORQUE DEVICE

## (undated) DEVICE — CATH TEMPO 5F BER 100CM: Brand: TEMPO

## (undated) DEVICE — LEX NEURO ANGIOGRAPHY: Brand: MEDLINE INDUSTRIES, INC.

## (undated) DEVICE — ROTATING HEMOSTATIC VALVE .096": Brand: RHV